# Patient Record
Sex: FEMALE | Race: WHITE | NOT HISPANIC OR LATINO | Employment: OTHER | URBAN - METROPOLITAN AREA
[De-identification: names, ages, dates, MRNs, and addresses within clinical notes are randomized per-mention and may not be internally consistent; named-entity substitution may affect disease eponyms.]

---

## 2017-01-10 ENCOUNTER — HOSPITAL ENCOUNTER (OUTPATIENT)
Dept: LAB | Facility: MEDICAL CENTER | Age: 82
End: 2017-01-10
Attending: INTERNAL MEDICINE
Payer: MEDICARE

## 2017-01-10 LAB
25(OH)D3 SERPL-MCNC: 25 NG/ML (ref 30–100)
ALBUMIN SERPL BCP-MCNC: 3.8 G/DL (ref 3.2–4.9)
BASOPHILS # BLD AUTO: 0.03 K/UL (ref 0–0.12)
BASOPHILS NFR BLD AUTO: 0.3 % (ref 0–1.8)
BUN SERPL-MCNC: 27 MG/DL (ref 8–22)
CALCIUM SERPL-MCNC: 9.5 MG/DL (ref 8.5–10.5)
CHLORIDE SERPL-SCNC: 107 MMOL/L (ref 96–112)
CO2 SERPL-SCNC: 20 MMOL/L (ref 20–33)
CREAT SERPL-MCNC: 1.46 MG/DL (ref 0.5–1.4)
CREAT UR-MCNC: 89 MG/DL
EOSINOPHIL # BLD: 0.09 K/UL (ref 0–0.51)
EOSINOPHIL NFR BLD AUTO: 0.8 % (ref 0–6.9)
ERYTHROCYTE [DISTWIDTH] IN BLOOD BY AUTOMATED COUNT: 49.4 FL (ref 35.9–50)
GLUCOSE SERPL-MCNC: 107 MG/DL (ref 65–99)
HCT VFR BLD AUTO: 38.5 % (ref 37–47)
HGB BLD-MCNC: 12.1 G/DL (ref 12–16)
IMM GRANULOCYTES # BLD AUTO: 0.04 K/UL (ref 0–0.11)
IMM GRANULOCYTES NFR BLD AUTO: 0.4 % (ref 0–0.9)
LYMPHOCYTES # BLD: 3.6 K/UL (ref 1–4.8)
LYMPHOCYTES NFR BLD AUTO: 33.5 % (ref 22–41)
MAGNESIUM SERPL-MCNC: 1.8 MG/DL (ref 1.5–2.5)
MCH RBC QN AUTO: 30.4 PG (ref 27–33)
MCHC RBC AUTO-ENTMCNC: 31.4 G/DL (ref 33.6–35)
MCV RBC AUTO: 96.7 FL (ref 81.4–97.8)
MONOCYTES # BLD: 0.84 K/UL (ref 0–0.85)
MONOCYTES NFR BLD AUTO: 7.8 % (ref 0–13.4)
NEUTROPHILS # BLD: 6.14 K/UL (ref 2–7.15)
NEUTROPHILS NFR BLD AUTO: 57.2 % (ref 44–72)
NRBC # BLD AUTO: 0 K/UL
NRBC BLD-RTO: 0 /100 WBC
PHOSPHATE SERPL-MCNC: 3.6 MG/DL (ref 2.5–4.5)
PLATELET # BLD AUTO: 336 K/UL (ref 164–446)
PMV BLD AUTO: 10.3 FL (ref 9–12.9)
POTASSIUM SERPL-SCNC: 4.2 MMOL/L (ref 3.6–5.5)
PROT 24H UR-MRATE: 18.7 MG/DL (ref 0–15)
PROT/CREAT UR: 210 MG/G (ref 10–107)
PTH-INTACT SERPL-MCNC: 86.8 PG/ML (ref 14–72)
RBC # BLD AUTO: 3.98 M/UL (ref 4.2–5.4)
SODIUM SERPL-SCNC: 136 MMOL/L (ref 135–145)
URATE SERPL-MCNC: 6.3 MG/DL (ref 1.9–8.2)
WBC # BLD AUTO: 10.7 K/UL (ref 4.8–10.8)

## 2017-01-10 PROCEDURE — 83735 ASSAY OF MAGNESIUM: CPT

## 2017-01-10 PROCEDURE — 83970 ASSAY OF PARATHORMONE: CPT

## 2017-01-10 PROCEDURE — 82570 ASSAY OF URINE CREATININE: CPT

## 2017-01-10 PROCEDURE — 85025 COMPLETE CBC W/AUTO DIFF WBC: CPT

## 2017-01-10 PROCEDURE — 84156 ASSAY OF PROTEIN URINE: CPT

## 2017-01-10 PROCEDURE — 36415 COLL VENOUS BLD VENIPUNCTURE: CPT

## 2017-01-10 PROCEDURE — 80069 RENAL FUNCTION PANEL: CPT

## 2017-01-10 PROCEDURE — 84550 ASSAY OF BLOOD/URIC ACID: CPT

## 2017-01-10 PROCEDURE — 82306 VITAMIN D 25 HYDROXY: CPT

## 2017-03-17 ENCOUNTER — HOSPITAL ENCOUNTER (OUTPATIENT)
Dept: RADIOLOGY | Facility: MEDICAL CENTER | Age: 82
End: 2017-03-17
Attending: NURSE PRACTITIONER
Payer: MEDICARE

## 2017-03-17 DIAGNOSIS — R41.3 MEMORY LOSS: ICD-10-CM

## 2017-03-17 PROCEDURE — 70551 MRI BRAIN STEM W/O DYE: CPT

## 2017-05-04 ENCOUNTER — HOSPITAL ENCOUNTER (OUTPATIENT)
Dept: LAB | Facility: MEDICAL CENTER | Age: 82
End: 2017-05-04
Attending: INTERNAL MEDICINE
Payer: MEDICARE

## 2017-05-04 ENCOUNTER — HOSPITAL ENCOUNTER (OUTPATIENT)
Dept: LAB | Facility: MEDICAL CENTER | Age: 82
End: 2017-05-04
Attending: NURSE PRACTITIONER
Payer: MEDICARE

## 2017-05-04 LAB
ALBUMIN SERPL BCP-MCNC: 3.9 G/DL (ref 3.2–4.9)
ALBUMIN SERPL BCP-MCNC: 4 G/DL (ref 3.2–4.9)
ALBUMIN/GLOB SERPL: 1.2 G/DL
ALP SERPL-CCNC: 73 U/L (ref 30–99)
ALT SERPL-CCNC: 10 U/L (ref 2–50)
ANION GAP SERPL CALC-SCNC: 9 MMOL/L (ref 0–11.9)
AST SERPL-CCNC: 16 U/L (ref 12–45)
BASOPHILS # BLD AUTO: 0.5 % (ref 0–1.8)
BASOPHILS # BLD: 0.03 K/UL (ref 0–0.12)
BILIRUB SERPL-MCNC: 0.3 MG/DL (ref 0.1–1.5)
BUN SERPL-MCNC: 46 MG/DL (ref 8–22)
BUN SERPL-MCNC: 46 MG/DL (ref 8–22)
CALCIUM SERPL-MCNC: 8.8 MG/DL (ref 8.5–10.5)
CALCIUM SERPL-MCNC: 8.9 MG/DL (ref 8.5–10.5)
CHLORIDE SERPL-SCNC: 106 MMOL/L (ref 96–112)
CHLORIDE SERPL-SCNC: 107 MMOL/L (ref 96–112)
CHOLEST SERPL-MCNC: 199 MG/DL (ref 100–199)
CO2 SERPL-SCNC: 23 MMOL/L (ref 20–33)
CO2 SERPL-SCNC: 23 MMOL/L (ref 20–33)
CREAT SERPL-MCNC: 1.64 MG/DL (ref 0.5–1.4)
CREAT SERPL-MCNC: 1.68 MG/DL (ref 0.5–1.4)
CREAT UR-MCNC: 98.4 MG/DL
EOSINOPHIL # BLD AUTO: 0.12 K/UL (ref 0–0.51)
EOSINOPHIL NFR BLD: 2 % (ref 0–6.9)
ERYTHROCYTE [DISTWIDTH] IN BLOOD BY AUTOMATED COUNT: 49.9 FL (ref 35.9–50)
GFR SERPL CREATININE-BSD FRML MDRD: 29 ML/MIN/1.73 M 2
GFR SERPL CREATININE-BSD FRML MDRD: 30 ML/MIN/1.73 M 2
GLOBULIN SER CALC-MCNC: 3.4 G/DL (ref 1.9–3.5)
GLUCOSE SERPL-MCNC: 94 MG/DL (ref 65–99)
GLUCOSE SERPL-MCNC: 95 MG/DL (ref 65–99)
HCT VFR BLD AUTO: 36.9 % (ref 37–47)
HDLC SERPL-MCNC: 59 MG/DL
HGB BLD-MCNC: 11.6 G/DL (ref 12–16)
IMM GRANULOCYTES # BLD AUTO: 0.01 K/UL (ref 0–0.11)
IMM GRANULOCYTES NFR BLD AUTO: 0.2 % (ref 0–0.9)
LDLC SERPL CALC-MCNC: 122 MG/DL
LYMPHOCYTES # BLD AUTO: 3.26 K/UL (ref 1–4.8)
LYMPHOCYTES NFR BLD: 53.9 % (ref 22–41)
MCH RBC QN AUTO: 30.1 PG (ref 27–33)
MCHC RBC AUTO-ENTMCNC: 31.4 G/DL (ref 33.6–35)
MCV RBC AUTO: 95.6 FL (ref 81.4–97.8)
MONOCYTES # BLD AUTO: 0.53 K/UL (ref 0–0.85)
MONOCYTES NFR BLD AUTO: 8.8 % (ref 0–13.4)
NEUTROPHILS # BLD AUTO: 2.1 K/UL (ref 2–7.15)
NEUTROPHILS NFR BLD: 34.6 % (ref 44–72)
NRBC # BLD AUTO: 0 K/UL
NRBC BLD AUTO-RTO: 0 /100 WBC
PHOSPHATE SERPL-MCNC: 3.9 MG/DL (ref 2.5–4.5)
PLATELET # BLD AUTO: 271 K/UL (ref 164–446)
PMV BLD AUTO: 10.7 FL (ref 9–12.9)
POTASSIUM SERPL-SCNC: 4.7 MMOL/L (ref 3.6–5.5)
POTASSIUM SERPL-SCNC: 4.7 MMOL/L (ref 3.6–5.5)
PROT SERPL-MCNC: 7.4 G/DL (ref 6–8.2)
PROT UR-MCNC: 8.8 MG/DL (ref 0–15)
PROT/CREAT UR: 89 MG/G (ref 10–107)
RBC # BLD AUTO: 3.86 M/UL (ref 4.2–5.4)
SODIUM SERPL-SCNC: 136 MMOL/L (ref 135–145)
SODIUM SERPL-SCNC: 139 MMOL/L (ref 135–145)
TRIGL SERPL-MCNC: 89 MG/DL (ref 0–149)
TSH SERPL DL<=0.005 MIU/L-ACNC: 2.57 UIU/ML (ref 0.3–3.7)
WBC # BLD AUTO: 6.1 K/UL (ref 4.8–10.8)

## 2017-05-04 PROCEDURE — 80061 LIPID PANEL: CPT

## 2017-05-04 PROCEDURE — 84443 ASSAY THYROID STIM HORMONE: CPT

## 2017-05-04 PROCEDURE — 80053 COMPREHEN METABOLIC PANEL: CPT

## 2017-09-18 ENCOUNTER — HOSPITAL ENCOUNTER (OUTPATIENT)
Dept: LAB | Facility: MEDICAL CENTER | Age: 82
End: 2017-09-18
Attending: INTERNAL MEDICINE
Payer: MEDICARE

## 2017-09-18 LAB
ALBUMIN SERPL BCP-MCNC: 3.7 G/DL (ref 3.2–4.9)
BUN SERPL-MCNC: 30 MG/DL (ref 8–22)
CALCIUM SERPL-MCNC: 9 MG/DL (ref 8.5–10.5)
CHLORIDE SERPL-SCNC: 101 MMOL/L (ref 96–112)
CO2 SERPL-SCNC: 25 MMOL/L (ref 20–33)
CREAT SERPL-MCNC: 1.33 MG/DL (ref 0.5–1.4)
CREAT UR-MCNC: 64.9 MG/DL
GFR SERPL CREATININE-BSD FRML MDRD: 38 ML/MIN/1.73 M 2
GLUCOSE SERPL-MCNC: 85 MG/DL (ref 65–99)
MAGNESIUM SERPL-MCNC: 2.1 MG/DL (ref 1.5–2.5)
PHOSPHATE SERPL-MCNC: 3.9 MG/DL (ref 2.5–4.5)
POTASSIUM SERPL-SCNC: 4 MMOL/L (ref 3.6–5.5)
PROT UR-MCNC: 7.5 MG/DL (ref 0–15)
PROT/CREAT UR: 116 MG/G (ref 10–107)
SODIUM SERPL-SCNC: 134 MMOL/L (ref 135–145)
URATE SERPL-MCNC: 6.1 MG/DL (ref 1.9–8.2)

## 2017-09-18 PROCEDURE — 36415 COLL VENOUS BLD VENIPUNCTURE: CPT

## 2017-09-18 PROCEDURE — 84550 ASSAY OF BLOOD/URIC ACID: CPT

## 2017-09-18 PROCEDURE — 80069 RENAL FUNCTION PANEL: CPT

## 2017-09-18 PROCEDURE — 82570 ASSAY OF URINE CREATININE: CPT

## 2017-09-18 PROCEDURE — 83735 ASSAY OF MAGNESIUM: CPT

## 2017-09-18 PROCEDURE — 84156 ASSAY OF PROTEIN URINE: CPT

## 2017-10-10 ENCOUNTER — HOSPITAL ENCOUNTER (OUTPATIENT)
Dept: RADIOLOGY | Facility: MEDICAL CENTER | Age: 82
End: 2017-10-10
Attending: NURSE PRACTITIONER
Payer: MEDICARE

## 2017-10-10 DIAGNOSIS — R10.84 ABDOMINAL PAIN, GENERALIZED: ICD-10-CM

## 2017-10-10 PROCEDURE — 76700 US EXAM ABDOM COMPLETE: CPT

## 2018-02-12 ENCOUNTER — HOSPITAL ENCOUNTER (OUTPATIENT)
Dept: LAB | Facility: MEDICAL CENTER | Age: 83
End: 2018-02-12
Attending: INTERNAL MEDICINE
Payer: MEDICARE

## 2018-02-12 LAB
25(OH)D3 SERPL-MCNC: 41 NG/ML (ref 30–100)
ALBUMIN SERPL BCP-MCNC: 4.2 G/DL (ref 3.2–4.9)
BASOPHILS # BLD AUTO: 0.3 % (ref 0–1.8)
BASOPHILS # BLD: 0.02 K/UL (ref 0–0.12)
BUN SERPL-MCNC: 46 MG/DL (ref 8–22)
CALCIUM SERPL-MCNC: 9.3 MG/DL (ref 8.5–10.5)
CHLORIDE SERPL-SCNC: 105 MMOL/L (ref 96–112)
CO2 SERPL-SCNC: 25 MMOL/L (ref 20–33)
CREAT SERPL-MCNC: 1.81 MG/DL (ref 0.5–1.4)
EOSINOPHIL # BLD AUTO: 0.05 K/UL (ref 0–0.51)
EOSINOPHIL NFR BLD: 0.8 % (ref 0–6.9)
ERYTHROCYTE [DISTWIDTH] IN BLOOD BY AUTOMATED COUNT: 49 FL (ref 35.9–50)
GLUCOSE SERPL-MCNC: 84 MG/DL (ref 65–99)
HCT VFR BLD AUTO: 34.6 % (ref 37–47)
HGB BLD-MCNC: 11.2 G/DL (ref 12–16)
IMM GRANULOCYTES # BLD AUTO: 0.01 K/UL (ref 0–0.11)
IMM GRANULOCYTES NFR BLD AUTO: 0.2 % (ref 0–0.9)
LYMPHOCYTES # BLD AUTO: 2.88 K/UL (ref 1–4.8)
LYMPHOCYTES NFR BLD: 45.8 % (ref 22–41)
MCH RBC QN AUTO: 30.9 PG (ref 27–33)
MCHC RBC AUTO-ENTMCNC: 32.4 G/DL (ref 33.6–35)
MCV RBC AUTO: 95.6 FL (ref 81.4–97.8)
MONOCYTES # BLD AUTO: 0.57 K/UL (ref 0–0.85)
MONOCYTES NFR BLD AUTO: 9.1 % (ref 0–13.4)
NEUTROPHILS # BLD AUTO: 2.76 K/UL (ref 2–7.15)
NEUTROPHILS NFR BLD: 43.8 % (ref 44–72)
NRBC # BLD AUTO: 0 K/UL
NRBC BLD-RTO: 0 /100 WBC
PHOSPHATE SERPL-MCNC: 4.5 MG/DL (ref 2.5–4.5)
PLATELET # BLD AUTO: 288 K/UL (ref 164–446)
PMV BLD AUTO: 10.2 FL (ref 9–12.9)
POTASSIUM SERPL-SCNC: 4.1 MMOL/L (ref 3.6–5.5)
PTH-INTACT SERPL-MCNC: 128.4 PG/ML (ref 14–72)
RBC # BLD AUTO: 3.62 M/UL (ref 4.2–5.4)
SODIUM SERPL-SCNC: 142 MMOL/L (ref 135–145)
WBC # BLD AUTO: 6.3 K/UL (ref 4.8–10.8)

## 2018-02-12 PROCEDURE — 85025 COMPLETE CBC W/AUTO DIFF WBC: CPT

## 2018-02-12 PROCEDURE — 80069 RENAL FUNCTION PANEL: CPT

## 2018-02-12 PROCEDURE — 82306 VITAMIN D 25 HYDROXY: CPT

## 2018-02-12 PROCEDURE — 36415 COLL VENOUS BLD VENIPUNCTURE: CPT

## 2018-02-12 PROCEDURE — 83970 ASSAY OF PARATHORMONE: CPT

## 2018-03-08 ENCOUNTER — HOSPITAL ENCOUNTER (OUTPATIENT)
Dept: LAB | Facility: MEDICAL CENTER | Age: 83
End: 2018-03-08
Attending: NURSE PRACTITIONER
Payer: MEDICARE

## 2018-03-08 ENCOUNTER — HOSPITAL ENCOUNTER (OUTPATIENT)
Dept: LAB | Facility: MEDICAL CENTER | Age: 83
End: 2018-03-08
Attending: INTERNAL MEDICINE
Payer: MEDICARE

## 2018-03-08 LAB
ALBUMIN SERPL BCP-MCNC: 4.3 G/DL (ref 3.2–4.9)
ALBUMIN SERPL BCP-MCNC: 4.4 G/DL (ref 3.2–4.9)
ALBUMIN/GLOB SERPL: 1.5 G/DL
ALP SERPL-CCNC: 60 U/L (ref 30–99)
ALT SERPL-CCNC: 15 U/L (ref 2–50)
ANION GAP SERPL CALC-SCNC: 8 MMOL/L (ref 0–11.9)
AST SERPL-CCNC: 19 U/L (ref 12–45)
BILIRUB SERPL-MCNC: 0.3 MG/DL (ref 0.1–1.5)
BUN SERPL-MCNC: 45 MG/DL (ref 8–22)
BUN SERPL-MCNC: 45 MG/DL (ref 8–22)
CALCIUM SERPL-MCNC: 9.7 MG/DL (ref 8.5–10.5)
CALCIUM SERPL-MCNC: 9.8 MG/DL (ref 8.5–10.5)
CHLORIDE SERPL-SCNC: 106 MMOL/L (ref 96–112)
CHLORIDE SERPL-SCNC: 106 MMOL/L (ref 96–112)
CHOLEST SERPL-MCNC: 196 MG/DL (ref 100–199)
CO2 SERPL-SCNC: 24 MMOL/L (ref 20–33)
CO2 SERPL-SCNC: 24 MMOL/L (ref 20–33)
CREAT SERPL-MCNC: 1.61 MG/DL (ref 0.5–1.4)
CREAT SERPL-MCNC: 1.67 MG/DL (ref 0.5–1.4)
CREAT UR-MCNC: 77.3 MG/DL
FERRITIN SERPL-MCNC: 58.6 NG/ML (ref 10–291)
GLOBULIN SER CALC-MCNC: 3 G/DL (ref 1.9–3.5)
GLUCOSE SERPL-MCNC: 87 MG/DL (ref 65–99)
GLUCOSE SERPL-MCNC: 89 MG/DL (ref 65–99)
HDLC SERPL-MCNC: 70 MG/DL
IRON SATN MFR SERPL: 19 % (ref 15–55)
IRON SERPL-MCNC: 73 UG/DL (ref 40–170)
LDLC SERPL CALC-MCNC: 104 MG/DL
PHOSPHATE SERPL-MCNC: 3.8 MG/DL (ref 2.5–4.5)
POTASSIUM SERPL-SCNC: 5 MMOL/L (ref 3.6–5.5)
POTASSIUM SERPL-SCNC: 5.4 MMOL/L (ref 3.6–5.5)
PROT SERPL-MCNC: 7.4 G/DL (ref 6–8.2)
PROT UR-MCNC: 7.1 MG/DL (ref 0–15)
PROT/CREAT UR: 92 MG/G (ref 10–107)
SODIUM SERPL-SCNC: 138 MMOL/L (ref 135–145)
SODIUM SERPL-SCNC: 139 MMOL/L (ref 135–145)
TIBC SERPL-MCNC: 379 UG/DL (ref 250–450)
TRIGL SERPL-MCNC: 110 MG/DL (ref 0–149)
TSH SERPL DL<=0.005 MIU/L-ACNC: 2.47 UIU/ML (ref 0.38–5.33)

## 2018-03-08 PROCEDURE — 36415 COLL VENOUS BLD VENIPUNCTURE: CPT

## 2018-03-08 PROCEDURE — 82570 ASSAY OF URINE CREATININE: CPT

## 2018-03-08 PROCEDURE — 80053 COMPREHEN METABOLIC PANEL: CPT

## 2018-03-08 PROCEDURE — 83550 IRON BINDING TEST: CPT

## 2018-03-08 PROCEDURE — 84156 ASSAY OF PROTEIN URINE: CPT

## 2018-03-08 PROCEDURE — 80069 RENAL FUNCTION PANEL: CPT

## 2018-03-08 PROCEDURE — 84443 ASSAY THYROID STIM HORMONE: CPT

## 2018-03-08 PROCEDURE — 82728 ASSAY OF FERRITIN: CPT

## 2018-03-08 PROCEDURE — 80061 LIPID PANEL: CPT

## 2018-03-08 PROCEDURE — 83540 ASSAY OF IRON: CPT

## 2018-05-17 ENCOUNTER — HOSPITAL ENCOUNTER (OUTPATIENT)
Dept: LAB | Facility: MEDICAL CENTER | Age: 83
End: 2018-05-17
Attending: INTERNAL MEDICINE
Payer: MEDICARE

## 2018-05-17 LAB
ALBUMIN SERPL BCP-MCNC: 4.1 G/DL (ref 3.2–4.9)
BASOPHILS # BLD AUTO: 0.5 % (ref 0–1.8)
BASOPHILS # BLD: 0.03 K/UL (ref 0–0.12)
BUN SERPL-MCNC: 28 MG/DL (ref 8–22)
CALCIUM SERPL-MCNC: 9.2 MG/DL (ref 8.5–10.5)
CHLORIDE SERPL-SCNC: 108 MMOL/L (ref 96–112)
CO2 SERPL-SCNC: 24 MMOL/L (ref 20–33)
CREAT SERPL-MCNC: 1.26 MG/DL (ref 0.5–1.4)
EOSINOPHIL # BLD AUTO: 0.11 K/UL (ref 0–0.51)
EOSINOPHIL NFR BLD: 1.7 % (ref 0–6.9)
ERYTHROCYTE [DISTWIDTH] IN BLOOD BY AUTOMATED COUNT: 51.8 FL (ref 35.9–50)
GLUCOSE SERPL-MCNC: 79 MG/DL (ref 65–99)
HCT VFR BLD AUTO: 38.1 % (ref 37–47)
HGB BLD-MCNC: 11.5 G/DL (ref 12–16)
IMM GRANULOCYTES # BLD AUTO: 0.01 K/UL (ref 0–0.11)
IMM GRANULOCYTES NFR BLD AUTO: 0.2 % (ref 0–0.9)
LYMPHOCYTES # BLD AUTO: 2.22 K/UL (ref 1–4.8)
LYMPHOCYTES NFR BLD: 33.4 % (ref 22–41)
MCH RBC QN AUTO: 30.4 PG (ref 27–33)
MCHC RBC AUTO-ENTMCNC: 30.2 G/DL (ref 33.6–35)
MCV RBC AUTO: 100.8 FL (ref 81.4–97.8)
MONOCYTES # BLD AUTO: 0.51 K/UL (ref 0–0.85)
MONOCYTES NFR BLD AUTO: 7.7 % (ref 0–13.4)
NEUTROPHILS # BLD AUTO: 3.76 K/UL (ref 2–7.15)
NEUTROPHILS NFR BLD: 56.5 % (ref 44–72)
NRBC # BLD AUTO: 0 K/UL
NRBC BLD-RTO: 0 /100 WBC
PHOSPHATE SERPL-MCNC: 3.4 MG/DL (ref 2.5–4.5)
PLATELET # BLD AUTO: 315 K/UL (ref 164–446)
PMV BLD AUTO: 10.4 FL (ref 9–12.9)
POTASSIUM SERPL-SCNC: 4.5 MMOL/L (ref 3.6–5.5)
RBC # BLD AUTO: 3.78 M/UL (ref 4.2–5.4)
SODIUM SERPL-SCNC: 139 MMOL/L (ref 135–145)
URATE SERPL-MCNC: 5.5 MG/DL (ref 1.9–8.2)
WBC # BLD AUTO: 6.6 K/UL (ref 4.8–10.8)

## 2018-05-17 PROCEDURE — 36415 COLL VENOUS BLD VENIPUNCTURE: CPT

## 2018-05-17 PROCEDURE — 85025 COMPLETE CBC W/AUTO DIFF WBC: CPT

## 2018-05-17 PROCEDURE — 80069 RENAL FUNCTION PANEL: CPT

## 2018-05-17 PROCEDURE — 84550 ASSAY OF BLOOD/URIC ACID: CPT

## 2018-05-25 ENCOUNTER — OFFICE VISIT (OUTPATIENT)
Dept: MEDICAL GROUP | Facility: MEDICAL CENTER | Age: 83
End: 2018-05-25
Payer: MEDICARE

## 2018-05-25 VITALS
OXYGEN SATURATION: 93 % | DIASTOLIC BLOOD PRESSURE: 40 MMHG | RESPIRATION RATE: 16 BRPM | TEMPERATURE: 98.2 F | HEART RATE: 77 BPM | BODY MASS INDEX: 22.64 KG/M2 | HEIGHT: 60 IN | WEIGHT: 115.3 LBS | SYSTOLIC BLOOD PRESSURE: 112 MMHG

## 2018-05-25 DIAGNOSIS — K21.9 GASTROESOPHAGEAL REFLUX DISEASE WITHOUT ESOPHAGITIS: ICD-10-CM

## 2018-05-25 DIAGNOSIS — M79.7 FIBROMYALGIA: ICD-10-CM

## 2018-05-25 DIAGNOSIS — N25.81 SECONDARY HYPERPARATHYROIDISM OF RENAL ORIGIN (HCC): ICD-10-CM

## 2018-05-25 DIAGNOSIS — E78.5 DYSLIPIDEMIA: ICD-10-CM

## 2018-05-25 DIAGNOSIS — K58.9 IRRITABLE BOWEL SYNDROME, UNSPECIFIED TYPE: ICD-10-CM

## 2018-05-25 DIAGNOSIS — I10 ESSENTIAL HYPERTENSION: ICD-10-CM

## 2018-05-25 DIAGNOSIS — E03.4 HYPOTHYROIDISM DUE TO ACQUIRED ATROPHY OF THYROID: ICD-10-CM

## 2018-05-25 DIAGNOSIS — F32.4 MAJOR DEPRESSIVE DISORDER WITH SINGLE EPISODE, IN PARTIAL REMISSION (HCC): ICD-10-CM

## 2018-05-25 DIAGNOSIS — N18.30 CKD (CHRONIC KIDNEY DISEASE) STAGE 3, GFR 30-59 ML/MIN (HCC): ICD-10-CM

## 2018-05-25 PROCEDURE — 99204 OFFICE O/P NEW MOD 45 MIN: CPT | Performed by: FAMILY MEDICINE

## 2018-05-25 RX ORDER — SUCRALFATE 1 G/1
1 TABLET ORAL
Status: ON HOLD | COMMUNITY
End: 2018-07-25

## 2018-05-25 NOTE — PROGRESS NOTES
CC: New patient ( HTN, depression, GERD, thyroid hypofunction, IBS, fibromyalgia, CKD, others)    HPI:  Anthony presents today to establish a new PCP.    Patient has been active, and independent with al ADLs. Has the following medical issues:    Essential hypertension  BP has been adequately controlled on current medication. Denies headache, chest pain, and SOB.Has been on lisinopril 10 mg daily    Major depressive disorder with single episode, in partial remission (HCC)  Patient has been asymptomatic. Her mood has improved a lot with the on Cymbalta,denies any suicidal ideation.    Gastroesophageal reflux disease without esophagitis  Has been asymptomatic. Denies epigastric pain, and heart burn which was more frequent and debilitating befre using the Sucralfate/ and Zantac     Hypothyroidism due to acquired atrophy of thyroid  She has been tolerating Levothyroxine, no palpitation, no cold or heat intolerance, currently on Levothyroxine 50 mcg daily.    Irritable bowel syndrome, unspecified type  Patient is currently asymptomatic.Symptoms usually comes as abdominal pain with constipation or diarrhea, and stays with her for few days and disappear for a while.Has been well controlled on diet.    Fibromyalgia  Patient stated that the Norco, Alprazolam, and Tizanidine has been helping her symptoms,and she does not taking them in a regular basis, only as needed. Discussed with patient the risk of taking those medication in her age, insisted that are only combination that has been helping. Advised her to go to pain specialist to discuss and evaluate her pain from fibromyalgia.    Dyslipidemia  Last lipid panel ( T chol 196, , HDL 70, ). Not on medication.No h/o stroke, DM, or CAD.    CKD (chronic kidney disease) stage 3, GFR 30-59 ml/min  She has been asymptomatic. Her most recent eGFR is 40, however with normal Cr ( 1.2).has been following up with nephrology.    Secondary hyperparathyroidism of renal origin  (HCC)  Her last PTH was checked in 2/2018 was high ( 128), however her eGFR at that time was low ( 26), now is 40. Her calcuim has been within normal limit.Has been asymptomatic.      Patient Active Problem List    Diagnosis Date Noted   • Tachyarrhythmia 04/28/2011     Priority: High   • Other hammer toe (acquired) 08/06/2015   • LBBB (left bundle branch block) 11/07/2013   • CKD (chronic kidney disease) stage 3, GFR 30-59 ml/min 11/07/2013   • Bruit of left carotid artery 11/07/2013   • Fatigue 11/07/2013   • Glaucoma 11/30/2012   • Toe dislocation 05/17/2012   • Major depression 10/27/2011   • HTN (hypertension) 04/28/2011   • GERD (gastroesophageal reflux disease) 04/28/2011   • Renal insufficiency 04/12/2011   • CATARACT    • Fibromyalgia    • IBS (irritable bowel syndrome)    • Dyslipidemia    • Hives        Current Outpatient Prescriptions   Medication Sig Dispense Refill   • sucralfate (CARAFATE) 1 GM Tab Take 1 g by mouth 4 Times a Day,Before Meals and at Bedtime.     • duloxetine (CYMBALTA) 60 MG CPEP delayed-release capsule Take 60 mg by mouth every day.     • lisinopril (PRINIVIL) 10 MG TABS Take 20 mg by mouth every day.     • hydrocodone-acetaminophen (NORCO) 7.5-325 MG per tablet Take 1-2 Tabs by mouth every 6 hours as needed.     • tizanidine (ZANAFLEX) 2 MG capsule Take 2 mg by mouth as needed.     • Cholecalciferol (VITAMIN D3) 5000 UNITS TABS Take  by mouth every day.     • Ferrous Sulfate (IRON) 325 (65 FE) MG TABS Take  by mouth every 48 hours.     • diphenhydrAMINE (BENADRYL) 25 MG TABS Take 25 mg by mouth every 6 hours as needed for Sleep.     • levothyroxine (SYNTHROID) 50 MCG TABS Take 50 mcg by mouth every day.     • alprazolam (XANAX) 0.25 MG TABS Take 1 Tab by mouth at bedtime as needed for Sleep. [employer] fax # [phone]   • ranitidine (ZANTAC) 150 MG TABS Take 150 mg by mouth as needed.     • GLUCOSAMINE PO Take 1,000 mg by mouth every day.     • latanoprost (XALATAN) 0.005 %  SOLN Place 1 Drop in both eyes every evening.       No current facility-administered medications for this visit.          Allergies as of 05/25/2018 - Reviewed 05/25/2018   Allergen Reaction Noted   • Nkda [no known drug allergy]  01/11/2011        Social History     Social History   • Marital status:      Spouse name: N/A   • Number of children: N/A   • Years of education: N/A     Occupational History   • Not on file.     Social History Main Topics   • Smoking status: Never Smoker   • Smokeless tobacco: Never Used   • Alcohol use No   • Drug use: No   • Sexual activity: No     Other Topics Concern   • Not on file     Social History Narrative   • No narrative on file       Family History   Problem Relation Age of Onset   • Heart Disease Father    • Lung Disease Father        Past Surgical History:   Procedure Laterality Date   • TOE AMPUTATION Left 8/6/2015    Procedure: TOE AMPUTATION 2ND;  Surgeon: Fredi Nelson D.P.M.;  Location: SURGERY [facility];  Service:    • CATARACT EXTRACTION WITH IOL     • OTHER      cataracts removed       ROS:  Denies any Headache, Blurred Vision, Confusion Chest pain,  Shortness of breath,  Abdominal pain, Changes of bowel or bladder, Lower ext edema, Fevers, Nights sweats, Weight Changes, Focal weakness or numbness.  All other systems are negative.    /40   Pulse 77   Temp 36.8 °C (98.2 °F)   Resp 16   Ht 1.524 m (5')   Wt 52.3 kg (115 lb 4.8 oz)   SpO2 93%   BMI 22.52 kg/m²     Physical Exam:  Gen:         Alert and oriented, No apparent distress.  HEENT:   Perrla, TM clear,  Oralpharynx no erythema or exudates.  Neck:       No Jugular venous distension, Lymphadenopathy, Thyromegaly, Bruits.  Lungs:     Clear to auscultation bilaterally  CV:          Regular rate and rhythm. No murmurs, rubs or gallops.  Abd:         Soft non tender, non distended. Normal active bowel sounds. No                                        Hepatosplenomegaly, No pulsatile  masses.  Ext:          No clubbing, cyanosis, edema.      Assessment and Plan.   86 y.o. female     1. Essential hypertension  Has been adequately controlled on current medication. Denies headache, chest pain, and SOB.  Continue on lisinopril 10 mg daily    2. Major depressive disorder with single episode, in partial remission (HCC)  Has been doing fine on Cymbalta, which shs been helping her fibromyalgia as well.    3. Gastroesophageal reflux disease without esophagitis  Sucralfate/ and Zantac have been helping a lot.     4. Hypothyroidism due to acquired atrophy of thyroid  He has been tolerating Levothyroxine, no palpitation, no cold or heat intolerance  Continue on Levothyroxine 50 mcg daily.    5. Irritable bowel syndrome, unspecified type  In remission, currently asymptomatic.    6. Fibromyalgia  As per patient Norco, Alprazolam, and Tizanidine has been helping her symptoms, patient stated she has been taking those medication as needed. Discussed with patient the risk of taking those medication in her age, insisted that are only combination that has been helping. Advised her to go to pain specialist to discuss and evaluate her pain from fibromyalgia, patient agrees.    - REFERRAL TO PAIN CLINIC    7. Dyslipidemia  Last lipid panel ( T chol 196, , HDL 70, ). Not on medication.  Ptient is counseled about life style modification.    8. CKD (chronic kidney disease) stage 3, GFR 30-59 ml/min  Most recent eGFR is 40, however with normal Cr ( 1.2).  Recommend: hydration, and avoiding nephrotoxic medications.    9. Secondary hyperparathyroidism of renal origin (HCC)  Last PTH was checked in 2/2018 was high ( 128), however her eGFR at that time was low ( 26), now is 40. Her calcuim has been within normal limit.

## 2018-06-27 RX ORDER — DULOXETIN HYDROCHLORIDE 60 MG/1
60 CAPSULE, DELAYED RELEASE ORAL DAILY
Qty: 30 CAP | Refills: 3 | Status: SHIPPED | OUTPATIENT
Start: 2018-06-27 | End: 2018-07-19 | Stop reason: SDUPTHER

## 2018-07-19 RX ORDER — DULOXETIN HYDROCHLORIDE 60 MG/1
60 CAPSULE, DELAYED RELEASE ORAL DAILY
Qty: 30 CAP | Refills: 3 | Status: SHIPPED | OUTPATIENT
Start: 2018-07-19 | End: 2018-07-24 | Stop reason: SDUPTHER

## 2018-07-24 ENCOUNTER — APPOINTMENT (OUTPATIENT)
Dept: RADIOLOGY | Facility: MEDICAL CENTER | Age: 83
End: 2018-07-24
Attending: EMERGENCY MEDICINE
Payer: MEDICARE

## 2018-07-24 ENCOUNTER — HOSPITAL ENCOUNTER (OUTPATIENT)
Facility: MEDICAL CENTER | Age: 83
End: 2018-07-26
Attending: EMERGENCY MEDICINE | Admitting: HOSPITALIST
Payer: MEDICARE

## 2018-07-24 DIAGNOSIS — I47.10 SVT (SUPRAVENTRICULAR TACHYCARDIA): ICD-10-CM

## 2018-07-24 LAB
ALBUMIN SERPL BCP-MCNC: 4.1 G/DL (ref 3.2–4.9)
ALBUMIN/GLOB SERPL: 1.9 G/DL
ALP SERPL-CCNC: 58 U/L (ref 30–99)
ALT SERPL-CCNC: 21 U/L (ref 2–50)
ANION GAP SERPL CALC-SCNC: 9 MMOL/L (ref 0–11.9)
APTT PPP: 28.1 SEC (ref 24.7–36)
AST SERPL-CCNC: 27 U/L (ref 12–45)
BASOPHILS # BLD AUTO: 0.4 % (ref 0–1.8)
BASOPHILS # BLD: 0.02 K/UL (ref 0–0.12)
BILIRUB SERPL-MCNC: 0.3 MG/DL (ref 0.1–1.5)
BNP SERPL-MCNC: 200 PG/ML (ref 0–100)
BUN SERPL-MCNC: 33 MG/DL (ref 8–22)
CALCIUM SERPL-MCNC: 8.9 MG/DL (ref 8.5–10.5)
CHLORIDE SERPL-SCNC: 110 MMOL/L (ref 96–112)
CO2 SERPL-SCNC: 22 MMOL/L (ref 20–33)
CREAT SERPL-MCNC: 1.43 MG/DL (ref 0.5–1.4)
EOSINOPHIL # BLD AUTO: 0.08 K/UL (ref 0–0.51)
EOSINOPHIL NFR BLD: 1.6 % (ref 0–6.9)
ERYTHROCYTE [DISTWIDTH] IN BLOOD BY AUTOMATED COUNT: 52.4 FL (ref 35.9–50)
GLOBULIN SER CALC-MCNC: 2.2 G/DL (ref 1.9–3.5)
GLUCOSE SERPL-MCNC: 105 MG/DL (ref 65–99)
HCT VFR BLD AUTO: 33.5 % (ref 37–47)
HGB BLD-MCNC: 10.9 G/DL (ref 12–16)
IMM GRANULOCYTES # BLD AUTO: 0.01 K/UL (ref 0–0.11)
IMM GRANULOCYTES NFR BLD AUTO: 0.2 % (ref 0–0.9)
INR PPP: 1.12 (ref 0.87–1.13)
LYMPHOCYTES # BLD AUTO: 2.37 K/UL (ref 1–4.8)
LYMPHOCYTES NFR BLD: 46.3 % (ref 22–41)
MCH RBC QN AUTO: 30.9 PG (ref 27–33)
MCHC RBC AUTO-ENTMCNC: 32.5 G/DL (ref 33.6–35)
MCV RBC AUTO: 94.9 FL (ref 81.4–97.8)
MONOCYTES # BLD AUTO: 0.5 K/UL (ref 0–0.85)
MONOCYTES NFR BLD AUTO: 9.8 % (ref 0–13.4)
NEUTROPHILS # BLD AUTO: 2.14 K/UL (ref 2–7.15)
NEUTROPHILS NFR BLD: 41.7 % (ref 44–72)
NRBC # BLD AUTO: 0 K/UL
NRBC BLD-RTO: 0 /100 WBC
PLATELET # BLD AUTO: 238 K/UL (ref 164–446)
PMV BLD AUTO: 10.3 FL (ref 9–12.9)
POTASSIUM SERPL-SCNC: 3.8 MMOL/L (ref 3.6–5.5)
PROT SERPL-MCNC: 6.3 G/DL (ref 6–8.2)
PROTHROMBIN TIME: 14.1 SEC (ref 12–14.6)
RBC # BLD AUTO: 3.53 M/UL (ref 4.2–5.4)
SODIUM SERPL-SCNC: 141 MMOL/L (ref 135–145)
TROPONIN I SERPL-MCNC: 0.02 NG/ML (ref 0–0.04)
TSH SERPL DL<=0.005 MIU/L-ACNC: 1.87 UIU/ML (ref 0.38–5.33)
WBC # BLD AUTO: 5.1 K/UL (ref 4.8–10.8)

## 2018-07-24 PROCEDURE — 36415 COLL VENOUS BLD VENIPUNCTURE: CPT

## 2018-07-24 PROCEDURE — 99285 EMERGENCY DEPT VISIT HI MDM: CPT

## 2018-07-24 PROCEDURE — 85730 THROMBOPLASTIN TIME PARTIAL: CPT

## 2018-07-24 PROCEDURE — 84484 ASSAY OF TROPONIN QUANT: CPT

## 2018-07-24 PROCEDURE — 85610 PROTHROMBIN TIME: CPT

## 2018-07-24 PROCEDURE — 80053 COMPREHEN METABOLIC PANEL: CPT

## 2018-07-24 PROCEDURE — 304561 HCHG STAT O2

## 2018-07-24 PROCEDURE — 93005 ELECTROCARDIOGRAM TRACING: CPT | Performed by: EMERGENCY MEDICINE

## 2018-07-24 PROCEDURE — 96372 THER/PROPH/DIAG INJ SC/IM: CPT

## 2018-07-24 PROCEDURE — 83880 ASSAY OF NATRIURETIC PEPTIDE: CPT

## 2018-07-24 PROCEDURE — 71045 X-RAY EXAM CHEST 1 VIEW: CPT

## 2018-07-24 PROCEDURE — 84443 ASSAY THYROID STIM HORMONE: CPT

## 2018-07-24 PROCEDURE — 85025 COMPLETE CBC W/AUTO DIFF WBC: CPT

## 2018-07-24 RX ORDER — DULOXETIN HYDROCHLORIDE 60 MG/1
60 CAPSULE, DELAYED RELEASE ORAL DAILY
Qty: 30 CAP | Refills: 3 | Status: SHIPPED | OUTPATIENT
Start: 2018-07-24 | End: 2018-10-29 | Stop reason: SDUPTHER

## 2018-07-24 ASSESSMENT — PAIN SCALES - GENERAL: PAINLEVEL_OUTOF10: 0

## 2018-07-25 ENCOUNTER — APPOINTMENT (OUTPATIENT)
Dept: RADIOLOGY | Facility: MEDICAL CENTER | Age: 83
End: 2018-07-25
Attending: HOSPITALIST
Payer: MEDICARE

## 2018-07-25 PROBLEM — E03.9 HYPOTHYROIDISM: Status: ACTIVE | Noted: 2018-07-25

## 2018-07-25 PROBLEM — N17.9 AKI (ACUTE KIDNEY INJURY) (HCC): Status: ACTIVE | Noted: 2018-07-25

## 2018-07-25 PROBLEM — D64.9 NORMOCYTIC ANEMIA: Status: ACTIVE | Noted: 2018-07-25

## 2018-07-25 LAB
EKG IMPRESSION: NORMAL
EKG IMPRESSION: NORMAL
EST. AVERAGE GLUCOSE BLD GHB EST-MCNC: 120 MG/DL
HBA1C MFR BLD: 5.8 % (ref 0–5.6)
TROPONIN I SERPL-MCNC: 0.05 NG/ML (ref 0–0.04)
TROPONIN I SERPL-MCNC: 0.07 NG/ML (ref 0–0.04)

## 2018-07-25 PROCEDURE — 99220 PR INITIAL OBSERVATION CARE,LEVL III: CPT | Performed by: HOSPITALIST

## 2018-07-25 PROCEDURE — G0378 HOSPITAL OBSERVATION PER HR: HCPCS

## 2018-07-25 PROCEDURE — 700111 HCHG RX REV CODE 636 W/ 250 OVERRIDE (IP): Performed by: HOSPITALIST

## 2018-07-25 PROCEDURE — 84484 ASSAY OF TROPONIN QUANT: CPT | Mod: 91

## 2018-07-25 PROCEDURE — 700105 HCHG RX REV CODE 258: Performed by: HOSPITALIST

## 2018-07-25 PROCEDURE — A9270 NON-COVERED ITEM OR SERVICE: HCPCS | Performed by: HOSPITALIST

## 2018-07-25 PROCEDURE — 96372 THER/PROPH/DIAG INJ SC/IM: CPT

## 2018-07-25 PROCEDURE — 83036 HEMOGLOBIN GLYCOSYLATED A1C: CPT

## 2018-07-25 PROCEDURE — 93005 ELECTROCARDIOGRAM TRACING: CPT | Performed by: EMERGENCY MEDICINE

## 2018-07-25 PROCEDURE — 36415 COLL VENOUS BLD VENIPUNCTURE: CPT

## 2018-07-25 PROCEDURE — 700102 HCHG RX REV CODE 250 W/ 637 OVERRIDE(OP): Performed by: HOSPITALIST

## 2018-07-25 RX ORDER — LEVOTHYROXINE SODIUM 0.05 MG/1
50 TABLET ORAL DAILY
Status: DISCONTINUED | OUTPATIENT
Start: 2018-07-25 | End: 2018-07-27 | Stop reason: HOSPADM

## 2018-07-25 RX ORDER — ONDANSETRON 4 MG/1
4 TABLET, ORALLY DISINTEGRATING ORAL EVERY 4 HOURS PRN
Status: DISCONTINUED | OUTPATIENT
Start: 2018-07-25 | End: 2018-07-27 | Stop reason: HOSPADM

## 2018-07-25 RX ORDER — BRINZOLAMIDE 10 MG/ML
1 SUSPENSION/ DROPS OPHTHALMIC 2 TIMES DAILY
COMMUNITY
End: 2021-07-30

## 2018-07-25 RX ORDER — DULOXETIN HYDROCHLORIDE 60 MG/1
60 CAPSULE, DELAYED RELEASE ORAL DAILY
Status: DISCONTINUED | OUTPATIENT
Start: 2018-07-25 | End: 2018-07-27 | Stop reason: HOSPADM

## 2018-07-25 RX ORDER — BISACODYL 10 MG
10 SUPPOSITORY, RECTAL RECTAL
Status: DISCONTINUED | OUTPATIENT
Start: 2018-07-25 | End: 2018-07-27 | Stop reason: HOSPADM

## 2018-07-25 RX ORDER — ASPIRIN 325 MG
325 TABLET ORAL DAILY
Status: DISCONTINUED | OUTPATIENT
Start: 2018-07-25 | End: 2018-07-27 | Stop reason: HOSPADM

## 2018-07-25 RX ORDER — DULOXETIN HYDROCHLORIDE 30 MG/1
30 CAPSULE, DELAYED RELEASE ORAL DAILY
Status: ON HOLD | COMMUNITY
End: 2018-07-25

## 2018-07-25 RX ORDER — LISINOPRIL 20 MG/1
20 TABLET ORAL DAILY
Status: DISCONTINUED | OUTPATIENT
Start: 2018-07-25 | End: 2018-07-27 | Stop reason: HOSPADM

## 2018-07-25 RX ORDER — ACETAMINOPHEN 325 MG/1
650 TABLET ORAL EVERY 6 HOURS PRN
Status: DISCONTINUED | OUTPATIENT
Start: 2018-07-25 | End: 2018-07-27 | Stop reason: HOSPADM

## 2018-07-25 RX ORDER — ASPIRIN 300 MG/1
300 SUPPOSITORY RECTAL DAILY
Status: DISCONTINUED | OUTPATIENT
Start: 2018-07-25 | End: 2018-07-27 | Stop reason: HOSPADM

## 2018-07-25 RX ORDER — ONDANSETRON 2 MG/ML
4 INJECTION INTRAMUSCULAR; INTRAVENOUS EVERY 4 HOURS PRN
Status: DISCONTINUED | OUTPATIENT
Start: 2018-07-25 | End: 2018-07-27 | Stop reason: HOSPADM

## 2018-07-25 RX ORDER — ASPIRIN 81 MG/1
324 TABLET, CHEWABLE ORAL DAILY
Status: DISCONTINUED | OUTPATIENT
Start: 2018-07-25 | End: 2018-07-27 | Stop reason: HOSPADM

## 2018-07-25 RX ORDER — LATANOPROST 50 UG/ML
1 SOLUTION/ DROPS OPHTHALMIC NIGHTLY
Status: DISCONTINUED | OUTPATIENT
Start: 2018-07-25 | End: 2018-07-27 | Stop reason: HOSPADM

## 2018-07-25 RX ORDER — SODIUM CHLORIDE 9 MG/ML
INJECTION, SOLUTION INTRAVENOUS CONTINUOUS
Status: DISCONTINUED | OUTPATIENT
Start: 2018-07-25 | End: 2018-07-27 | Stop reason: HOSPADM

## 2018-07-25 RX ORDER — CLONIDINE HYDROCHLORIDE 0.1 MG/1
0.2 TABLET ORAL EVERY 4 HOURS PRN
Status: DISCONTINUED | OUTPATIENT
Start: 2018-07-25 | End: 2018-07-27 | Stop reason: HOSPADM

## 2018-07-25 RX ORDER — AMOXICILLIN 250 MG
2 CAPSULE ORAL 2 TIMES DAILY
Status: DISCONTINUED | OUTPATIENT
Start: 2018-07-25 | End: 2018-07-27 | Stop reason: HOSPADM

## 2018-07-25 RX ORDER — TIZANIDINE HYDROCHLORIDE 4 MG/1
4 CAPSULE, GELATIN COATED ORAL EVERY 6 HOURS PRN
COMMUNITY
End: 2020-05-14 | Stop reason: SDUPTHER

## 2018-07-25 RX ORDER — POLYETHYLENE GLYCOL 3350 17 G/17G
1 POWDER, FOR SOLUTION ORAL
Status: DISCONTINUED | OUTPATIENT
Start: 2018-07-25 | End: 2018-07-27 | Stop reason: HOSPADM

## 2018-07-25 RX ORDER — LISINOPRIL 20 MG/1
20 TABLET ORAL EVERY MORNING
COMMUNITY
End: 2018-07-31 | Stop reason: SDUPTHER

## 2018-07-25 RX ORDER — RANITIDINE 150 MG/1
150 TABLET ORAL PRN
Status: DISCONTINUED | OUTPATIENT
Start: 2018-07-25 | End: 2018-07-25

## 2018-07-25 RX ORDER — HYDROCODONE BITARTRATE AND ACETAMINOPHEN 7.5; 325 MG/1; MG/1
1-2 TABLET ORAL EVERY 6 HOURS PRN
Status: DISCONTINUED | OUTPATIENT
Start: 2018-07-25 | End: 2018-07-26

## 2018-07-25 RX ADMIN — SODIUM CHLORIDE: 9 INJECTION, SOLUTION INTRAVENOUS at 05:35

## 2018-07-25 RX ADMIN — ENOXAPARIN SODIUM 30 MG: 100 INJECTION SUBCUTANEOUS at 05:36

## 2018-07-25 RX ADMIN — SODIUM CHLORIDE: 9 INJECTION, SOLUTION INTRAVENOUS at 06:53

## 2018-07-25 RX ADMIN — SODIUM CHLORIDE: 9 INJECTION, SOLUTION INTRAVENOUS at 19:11

## 2018-07-25 RX ADMIN — ACETAMINOPHEN 650 MG: 325 TABLET, FILM COATED ORAL at 23:19

## 2018-07-25 RX ADMIN — LISINOPRIL 20 MG: 20 TABLET ORAL at 05:37

## 2018-07-25 RX ADMIN — ASPIRIN 325 MG: 325 TABLET ORAL at 05:36

## 2018-07-25 RX ADMIN — LATANOPROST 1 DROP: 50 SOLUTION OPHTHALMIC at 23:02

## 2018-07-25 RX ADMIN — DULOXETINE HYDROCHLORIDE 60 MG: 60 CAPSULE, DELAYED RELEASE ORAL at 05:36

## 2018-07-25 RX ADMIN — LEVOTHYROXINE SODIUM 50 MCG: 50 TABLET ORAL at 07:00

## 2018-07-25 RX ADMIN — CLONIDINE HYDROCHLORIDE 0.2 MG: 0.1 TABLET ORAL at 07:58

## 2018-07-25 ASSESSMENT — LIFESTYLE VARIABLES
ALCOHOL_USE: NO
EVER_SMOKED: NEVER

## 2018-07-25 ASSESSMENT — COGNITIVE AND FUNCTIONAL STATUS - GENERAL
DAILY ACTIVITIY SCORE: 21
MOBILITY SCORE: 20
SUGGESTED CMS G CODE MODIFIER MOBILITY: CJ
STANDING UP FROM CHAIR USING ARMS: A LITTLE
DRESSING REGULAR LOWER BODY CLOTHING: A LITTLE
HELP NEEDED FOR BATHING: A LITTLE
CLIMB 3 TO 5 STEPS WITH RAILING: A LITTLE
TOILETING: A LITTLE
WALKING IN HOSPITAL ROOM: A LITTLE
MOVING FROM LYING ON BACK TO SITTING ON SIDE OF FLAT BED: A LITTLE
SUGGESTED CMS G CODE MODIFIER DAILY ACTIVITY: CJ

## 2018-07-25 ASSESSMENT — ENCOUNTER SYMPTOMS
EYES NEGATIVE: 1
RESPIRATORY NEGATIVE: 1
NEUROLOGICAL NEGATIVE: 1
CONSTITUTIONAL NEGATIVE: 1
PALPITATIONS: 1
MUSCULOSKELETAL NEGATIVE: 1
PSYCHIATRIC NEGATIVE: 1
GASTROINTESTINAL NEGATIVE: 1

## 2018-07-25 ASSESSMENT — PATIENT HEALTH QUESTIONNAIRE - PHQ9
2. FEELING DOWN, DEPRESSED, IRRITABLE, OR HOPELESS: NOT AT ALL
SUM OF ALL RESPONSES TO PHQ9 QUESTIONS 1 AND 2: 0
1. LITTLE INTEREST OR PLEASURE IN DOING THINGS: NOT AT ALL

## 2018-07-25 ASSESSMENT — COPD QUESTIONNAIRES
COPD SCREENING SCORE: 2
IN THE PAST 12 MONTHS DO YOU DO LESS THAN YOU USED TO BECAUSE OF YOUR BREATHING PROBLEMS: DISAGREE/UNSURE
DURING THE PAST 4 WEEKS HOW MUCH DID YOU FEEL SHORT OF BREATH: NONE/LITTLE OF THE TIME
DO YOU EVER COUGH UP ANY MUCUS OR PHLEGM?: NO/ONLY WITH OCCASIONAL COLDS OR INFECTIONS
HAVE YOU SMOKED AT LEAST 100 CIGARETTES IN YOUR ENTIRE LIFE: NO/DON'T KNOW

## 2018-07-25 ASSESSMENT — PAIN SCALES - GENERAL
PAINLEVEL_OUTOF10: 0

## 2018-07-25 NOTE — ED PROVIDER NOTES
ED Provider Note    CHIEF COMPLAINT  Chief Complaint   Patient presents with   • Supraventricular Tachycardia (SVT)     Pt called nurse helath line to report rapid HR up to 117 on her monitor.  EMS found her to be in SVT.  She converted during the EMS visit.  EKG upon arrival showed possible STEMI.         HPI  Anthony Lynn is a 86 y.o. female who presents with palpitations. The patient called the [facility] health hotline as she was having palpitations they recommended calling EMS. The patient did take an aspirin but states she does not have any chest pain or difficulty breathing associated with the palpitations. On arrival EMS did capture a supraventricular tachycardia with a rate of about 170. The patient subsequently spontaneously cardioverted back into a sinus rhythm and now on my exam she is asymptomatic. She states she has not had any recent vomiting or diarrhea. Again she denies chest pain and difficulty breathing associated with the palpitations. She does not have any pain or swelling to her lower extremities.    REVIEW OF SYSTEMS  See HPI for further details. All other systems are negative.     PAST MEDICAL HISTORY  Past Medical History:   Diagnosis Date   • Anxiety    • Arthritis    • CAD (coronary artery disease)    • CATARACT      B/L   • Dental disorder     upper and lower dentures   • Dyslipidemia    • Fibromyalgia    • GERD (gastroesophageal reflux disease)    • Glaucoma    • Headache(784.0)    • Hives    • Hives    • HTN    • IBS (irritable bowel syndrome)    • Indigestion    • Myocardial infarct (HCC)     2005   • Pain 7/31/15    all over 3/10 (hx of fibromyalgia   • Palpitations    • Psychiatric problem     depression   • Snoring    • Unspecified disorder of thyroid     hypothroidism, no longer on meds   • Unspecified urinary incontinence        SOCIAL HISTORY  Social History     Social History   • Marital status:      Spouse name: N/A   • Number of children: N/A   • Years of education:  N/A     Social History Main Topics   • Smoking status: Never Smoker   • Smokeless tobacco: Never Used   • Alcohol use No   • Drug use: No   • Sexual activity: No     Other Topics Concern   • Not on file     Social History Narrative   • No narrative on file           PHYSICAL EXAM  VITAL SIGNS: /71   Pulse 86   Temp 36.1 °C (97 °F)   Resp 16   Ht 1.524 m (5')   Wt 58.1 kg (128 lb)   SpO2 92%   BMI 25.00 kg/m²   Constitutional: Well developed, Well nourished, No acute distress, Non-toxic appearance.   HENT: Normocephalic, Atraumatic, tympanic membranes are intact and nonerythematous bilaterally, Oropharynx moist without exudates or erythema, Nose normal.   Eyes: PERRLA, EOMI, Conjunctiva normal.  Neck: Supple without meningismus  Lymphatic: No lymphadenopathy noted.   Cardiovascular: Normal heart rate, Normal rhythm, No murmurs, No rubs, No gallops.   Thorax & Lungs: Normal breath sounds, No respiratory distress, No wheezing, No chest tenderness.   Abdomen: Bowel sounds normal, Soft, No tenderness, no rebound, no guarding, no distention, No masses, No pulsatile masses.   Skin: Warm, Dry, No erythema, No rash.   Back: No tenderness, No CVA tenderness.   Extremities: Atraumatic with symmetric distal pulses, No edema, No tenderness, No cyanosis, No clubbing.   Neurologic: Alert & oriented x 3, cranial nerves II through XII are intact, Normal motor function, Normal sensory function, No focal deficits noted.   Psychiatric: Affect normal, Judgment normal, Mood normal.     EKG  12-lead EKG interpreted by myself shows a normal sinus rhythm with a ventricular rate of 84, the patient does have left ventricular hypertrophy with J-point elevation anteriorly but this is unchanged from prior. The patient also has ST segment depression laterally and inferiorly and again this appears unchanged from prior as well. The patient does not have any dynamic changes from prior.    RADIOLOGY/PROCEDURES  DX-CHEST-PORTABLE (1 VIEW)    Final Result         1.  No acute cardiopulmonary disease.   2.  Calcification of the aortic or tricuspid valve.   3.  Hyperexpansion of lungs favors changes of COPD.            COURSE & MEDICAL DECISION MAKING  Pertinent Labs & Imaging studies reviewed. (See chart for details)  This an 86-year-old female who presents to the emergency department after a bout of supraventricular tachycardia. I did see the rhythm strip and it was at a rate of approximate 150-170. She did spontaneously convert back into a sinus rhythm. The patient does not have any chest pain or difficulty breathing. Her EKG initially was concerning however does not show any dynamic changes from prior. She did not have any chest pain or difficulty with breathing throughout her of that and therefore an ischemic etiology be unlikely. The patient does not have any metabolic derangements that could cause cardiac irritability. The patient has been resting, full throughout her stay in the emergency department. She'll be admitted to the hospital for further observation. The patient's anemia is stable.    FINAL IMPRESSION  1. Supraventricular tachycardia  2. Stable anemia       Disposition  The patient will be admitted in stable condition    Electronically signed by: Miguel Zamudio, 7/24/2018 10:53 PM

## 2018-07-25 NOTE — PROGRESS NOTES
SBAR received from JAYNA Soriano. Pt c/o pain in neck and shoulder-chronic. Warm packs given. Pt with HTN-will medicate per eMAR. POC: HTN management, fall safety, cardiac stress test, skin check, admit, IV fluids.

## 2018-07-25 NOTE — PROGRESS NOTES
Bedside report received from JAYNA Quintanilla. Pt A&Ox4. No complaints of pain or SOB. Placed on tele monitor. SR, 60. BP elevated, otherwise VSS. POC discussed with pt. Pt verbalizes understanding. Call light and belongings within reach. Bed locked and in lowest position. Alarm and fall precautions in place.     Pt /82. MD paged at [phone].   Dr. Taveras responded. New PRN orders in place.

## 2018-07-25 NOTE — CARE PLAN
Problem: Safety  Goal: Will remain free from injury  Outcome: PROGRESSING SLOWER THAN EXPECTED    Intervention: Provide assistance with mobility  Pt unsteady on feet      Goal: Will remain free from falls  Outcome: PROGRESSING AS EXPECTED

## 2018-07-25 NOTE — ED TRIAGE NOTES
Chief Complaint   Patient presents with   • Supraventricular Tachycardia (SVT)     Pt called nurse helath line to report rapid HR up to 117 on her monitor.  EMS found her to be in SVT.  She converted during the EMS visit.  EKG upon arrival showed possible STEMI.       ERP at bedside.

## 2018-07-25 NOTE — ASSESSMENT & PLAN NOTE
On medication regimen as prescribed by physician, however is also taking a thyroid supplement by droplet.

## 2018-07-25 NOTE — ASSESSMENT & PLAN NOTE
Reportedly with SVT on arrival of EMS.  Concern that this could be due to overdosing of thyroid medication versus ischemic cause.  Will evaluate for ischemia with nuclear stress imaging study.  Trend troponin level for now.

## 2018-07-25 NOTE — ED TRIAGE NOTES
PEr ERP.  EKG changes from old infarct.  STEMI Alert cancelled.  PT reports being very stressed out all day and suspects that brought on the episode of rapid HR.

## 2018-07-25 NOTE — H&P
Hospital Medicine History & Physical Note    Date of Service  7/25/2018    Primary Care Physician  Maggi Olivarez M.D.    Consultants  none    Code Status  Full code     Chief Complaint  Palpitations     History of Presenting Illness  86 y.o. female with history of hypothyroidism, on replacement therapy and supplemental replacement therapy, fibromyalgia with pain controlled on opioid therapy, and gastroesophageal reflux disease which is controlled, was in her usual state of health until the day prior to admission.  Without any warning, she developed palpitations and fast heart rate.  Friends who were around, called emergency medical services against her wishes, and it was found that she had supraventricular tachycardia.  This resolved on its own.  She was subsequently told to present to the emergency department for further evaluation.  She currently has no chest pain or shortness of breath, no other complaints.  She does report that for the past 3 months or so she has been supplementing her prescribed thyroid medication with over-the-counter droplets.  She has been doing this because she feels that her thyroid is out of balance despite apparent testing that does not corroborate this.    Review of Systems  Review of Systems   Constitutional: Negative.    HENT: Negative.    Eyes: Negative.    Respiratory: Negative.    Cardiovascular: Positive for palpitations.   Gastrointestinal: Negative.    Genitourinary: Negative.    Musculoskeletal: Negative.    Skin: Negative.    Neurological: Negative.    Endo/Heme/Allergies: Negative.    Psychiatric/Behavioral: Negative.        Past Medical History   has a past medical history of Anxiety; Arthritis; CAD (coronary artery disease); CATARACT; Dental disorder; Dyslipidemia; Fibromyalgia; GERD (gastroesophageal reflux disease); Glaucoma; Headache(784.0); Hives; Hives; HTN; IBS (irritable bowel syndrome); Indigestion; Myocardial infarct (HCC); Pain (7/31/15); Palpitations;  Psychiatric problem; Snoring; Unspecified disorder of thyroid; and Unspecified urinary incontinence. She also has no past medical history of CHF (congestive heart failure) (HCC).    Surgical History   has a past surgical history that includes other; cataract extraction with iol; and toe amputation (Left, 8/6/2015).     Family History  family history includes Heart Disease in her father; Lung Disease in her father.     Social History   reports that she has never smoked. She has never used smokeless tobacco. She reports that she does not drink alcohol or use drugs.    Allergies  Allergies   Allergen Reactions   • Nkda [No Known Drug Allergy]        Medications  Prior to Admission Medications   Prescriptions Last Dose Informant Patient Reported? Taking?   Cholecalciferol (VITAMIN D3) 5000 UNITS TABS 5/25/2018 at Unknown time  Yes No   Sig: Take  by mouth every day.   DULoxetine (CYMBALTA) 60 MG Cap DR Particles delayed-release capsule   No No   Sig: Take 1 Cap by mouth every day.   Ferrous Sulfate (IRON) 325 (65 FE) MG TABS 5/25/2018 at Unknown time  Yes No   Sig: Take  by mouth every 48 hours.   GLUCOSAMINE PO 5/25/2018 at Unknown  Yes No   Sig: Take 1,000 mg by mouth every day.   alprazolam (XANAX) 0.25 MG TABS 7/24/2018 at 1900  No No   Sig: Take 1 Tab by mouth at bedtime as needed for Sleep. [employer] fax # [phone]   diphenhydrAMINE (BENADRYL) 25 MG TABS 5/25/2018 at Unknown time  Yes No   Sig: Take 25 mg by mouth every 6 hours as needed for Sleep.   hydrocodone-acetaminophen (NORCO) 7.5-325 MG per tablet 5/25/2018 at Unknown time  Yes No   Sig: Take 1-2 Tabs by mouth every 6 hours as needed.   latanoprost (XALATAN) 0.005 % SOLN 5/25/2018 at Unknown  Yes No   Sig: Place 1 Drop in both eyes every evening.   levothyroxine (SYNTHROID) 50 MCG TABS 5/25/2018 at Unknown  Yes No   Sig: Take 50 mcg by mouth every day.   lisinopril (PRINIVIL) 10 MG TABS 7/24/2018 at am  Yes No   Sig: Take 20 mg by mouth every day.    ranitidine (ZANTAC) 150 MG TABS 5/25/2018 at Unknown  Yes No   Sig: Take 150 mg by mouth as needed.   sucralfate (CARAFATE) 1 GM Tab 5/25/2018  Yes No   Sig: Take 1 g by mouth 4 Times a Day,Before Meals and at Bedtime.   tizanidine (ZANAFLEX) 2 MG capsule 5/25/2018  Yes No   Sig: Take 2 mg by mouth as needed.      Facility-Administered Medications: None       Physical Exam  Blood Pressure : 143/68   Temperature: 36.1 °C (97 °F)   Pulse: 66   Respiration: 18   Pulse Oximetry: 92 %     Physical Exam   Constitutional: She is oriented to person, place, and time. She appears well-developed and well-nourished. No distress.   HENT:   Head: Normocephalic and atraumatic.   Eyes: Pupils are equal, round, and reactive to light. Conjunctivae are normal.   Neck: Normal range of motion. Neck supple. No tracheal deviation present. No thyromegaly present.   Cardiovascular: Normal rate, regular rhythm and normal heart sounds.  Exam reveals no gallop and no friction rub.    No murmur heard.  Pulmonary/Chest: Effort normal and breath sounds normal. No respiratory distress. She has no wheezes. She has no rales.   Abdominal: Soft. Bowel sounds are normal. She exhibits no distension. There is no tenderness. There is no rebound.   Musculoskeletal: Normal range of motion. She exhibits no edema.   Lymphadenopathy:     She has no cervical adenopathy.   Neurological: She is alert and oriented to person, place, and time. No cranial nerve deficit.   Skin: Skin is warm and dry. She is not diaphoretic.   Psychiatric: She has a normal mood and affect.   Nursing note and vitals reviewed.      Laboratory:  Recent Labs      07/24/18   2248   WBC  5.1   RBC  3.53*   HEMOGLOBIN  10.9*   HEMATOCRIT  33.5*   MCV  94.9   MCH  30.9   MCHC  32.5*   RDW  52.4*   PLATELETCT  238   MPV  10.3     Recent Labs      07/24/18   2248   SODIUM  141   POTASSIUM  3.8   CHLORIDE  110   CO2  22   GLUCOSE  105*   BUN  33*   CREATININE  1.43*   CALCIUM  8.9     Recent  Labs      07/24/18 2248   ALTSGPT  21   ASTSGOT  27   ALKPHOSPHAT  58   TBILIRUBIN  0.3   GLUCOSE  105*     Recent Labs      07/24/18 2248   APTT  28.1   INR  1.12     Recent Labs      07/24/18 2248   BNPBTYPENAT  200*         Lab Results   Component Value Date    TROPONINI 0.02 07/24/2018       Urinalysis:    Lab Results   Component Value Date    SPECGRAVITY 1.014 04/10/2015    GLUCOSEUR Negative 04/10/2015    KETONES Negative 04/10/2015    NITRITE Negative 04/10/2015        Imaging:  DX-CHEST-PORTABLE (1 VIEW)   Final Result         1.  No acute cardiopulmonary disease.   2.  Calcification of the aortic or tricuspid valve.   3.  Hyperexpansion of lungs favors changes of COPD.      NM-CARDIAC STRESS TEST    (Results Pending)         Assessment/Plan:  I anticipate this patient is appropriate for observation status at this time.    * Tachyarrhythmia- (present on admission)   Assessment & Plan    Reportedly with SVT on arrival of EMS.  Concern that this could be due to overdosing of thyroid medication versus ischemic cause.  Will evaluate for ischemia with nuclear stress imaging study.  Trend troponin level for now.        Normocytic anemia   Assessment & Plan    Likely of chronic disease.  Monitor.  No evidence of bleed.         Hypothyroidism   Assessment & Plan    On medication regimen as prescribed by physician, however is also taking a thyroid supplement by droplet.        SUMA (acute kidney injury) ([facility])   Assessment & Plan    Acute versus acute on chronic kidney injury.  Avoid nephrotoxins.  Monitor        IBS (irritable bowel syndrome)- (present on admission)   Assessment & Plan    Controlled.  Monitor        Fibromyalgia- (present on admission)   Assessment & Plan    Controlled with current medication regimen including Vicodin daily.            VTE prophylaxis: SCD, lovenox

## 2018-07-25 NOTE — ED NOTES
Olga fall assessment completed. Pt is High risk for fall. Interventions complete. Pt placed in yellow non slip socks, wrist band placed, green sign on door. Bed locked in low position, call light in place. Personal possessions in place.  Personal needs assessed. Safety assessed. Will monitor frequently

## 2018-07-26 ENCOUNTER — APPOINTMENT (OUTPATIENT)
Dept: RADIOLOGY | Facility: MEDICAL CENTER | Age: 83
End: 2018-07-26
Attending: HOSPITALIST
Payer: MEDICARE

## 2018-07-26 VITALS
OXYGEN SATURATION: 97 % | SYSTOLIC BLOOD PRESSURE: 156 MMHG | BODY MASS INDEX: 24.11 KG/M2 | WEIGHT: 122.8 LBS | HEIGHT: 60 IN | DIASTOLIC BLOOD PRESSURE: 62 MMHG | TEMPERATURE: 98.1 F | HEART RATE: 54 BPM | RESPIRATION RATE: 16 BRPM

## 2018-07-26 PROBLEM — R00.1 SINUS BRADYCARDIA: Status: ACTIVE | Noted: 2018-07-26

## 2018-07-26 LAB
ALBUMIN SERPL BCP-MCNC: 3.3 G/DL (ref 3.2–4.9)
BUN SERPL-MCNC: 35 MG/DL (ref 8–22)
CALCIUM SERPL-MCNC: 8.2 MG/DL (ref 8.5–10.5)
CHLORIDE SERPL-SCNC: 114 MMOL/L (ref 96–112)
CO2 SERPL-SCNC: 20 MMOL/L (ref 20–33)
CREAT SERPL-MCNC: 1.18 MG/DL (ref 0.5–1.4)
ERYTHROCYTE [DISTWIDTH] IN BLOOD BY AUTOMATED COUNT: 52.7 FL (ref 35.9–50)
GLUCOSE SERPL-MCNC: 105 MG/DL (ref 65–99)
HCT VFR BLD AUTO: 30.2 % (ref 37–47)
HGB BLD-MCNC: 9.8 G/DL (ref 12–16)
MCH RBC QN AUTO: 30.5 PG (ref 27–33)
MCHC RBC AUTO-ENTMCNC: 32.5 G/DL (ref 33.6–35)
MCV RBC AUTO: 94.1 FL (ref 81.4–97.8)
PHOSPHATE SERPL-MCNC: 3 MG/DL (ref 2.5–4.5)
PLATELET # BLD AUTO: 210 K/UL (ref 164–446)
PMV BLD AUTO: 10.3 FL (ref 9–12.9)
POTASSIUM SERPL-SCNC: 4.1 MMOL/L (ref 3.6–5.5)
RBC # BLD AUTO: 3.21 M/UL (ref 4.2–5.4)
SODIUM SERPL-SCNC: 141 MMOL/L (ref 135–145)
WBC # BLD AUTO: 6.5 K/UL (ref 4.8–10.8)

## 2018-07-26 PROCEDURE — 80069 RENAL FUNCTION PANEL: CPT

## 2018-07-26 PROCEDURE — 700111 HCHG RX REV CODE 636 W/ 250 OVERRIDE (IP)

## 2018-07-26 PROCEDURE — 96372 THER/PROPH/DIAG INJ SC/IM: CPT

## 2018-07-26 PROCEDURE — 700111 HCHG RX REV CODE 636 W/ 250 OVERRIDE (IP): Performed by: HOSPITALIST

## 2018-07-26 PROCEDURE — A9270 NON-COVERED ITEM OR SERVICE: HCPCS | Performed by: HOSPITALIST

## 2018-07-26 PROCEDURE — 99217 PR OBSERVATION CARE DISCHARGE: CPT | Performed by: HOSPITALIST

## 2018-07-26 PROCEDURE — 700102 HCHG RX REV CODE 250 W/ 637 OVERRIDE(OP): Performed by: HOSPITALIST

## 2018-07-26 PROCEDURE — 36415 COLL VENOUS BLD VENIPUNCTURE: CPT

## 2018-07-26 PROCEDURE — 90471 IMMUNIZATION ADMIN: CPT

## 2018-07-26 PROCEDURE — 90670 PCV13 VACCINE IM: CPT | Performed by: HOSPITALIST

## 2018-07-26 PROCEDURE — 85027 COMPLETE CBC AUTOMATED: CPT

## 2018-07-26 PROCEDURE — 700105 HCHG RX REV CODE 258: Performed by: HOSPITALIST

## 2018-07-26 PROCEDURE — G0378 HOSPITAL OBSERVATION PER HR: HCPCS

## 2018-07-26 PROCEDURE — A9502 TC99M TETROFOSMIN: HCPCS

## 2018-07-26 RX ORDER — HYDROCODONE BITARTRATE AND ACETAMINOPHEN 5; 325 MG/1; MG/1
1 TABLET ORAL EVERY 4 HOURS PRN
Status: DISCONTINUED | OUTPATIENT
Start: 2018-07-26 | End: 2018-07-27 | Stop reason: HOSPADM

## 2018-07-26 RX ORDER — REGADENOSON 0.08 MG/ML
INJECTION, SOLUTION INTRAVENOUS
Status: COMPLETED
Start: 2018-07-26 | End: 2018-07-26

## 2018-07-26 RX ORDER — HYDROCODONE BITARTRATE AND ACETAMINOPHEN 5; 325 MG/1; MG/1
1 TABLET ORAL ONCE
Status: COMPLETED | OUTPATIENT
Start: 2018-07-26 | End: 2018-07-26

## 2018-07-26 RX ORDER — HYDROCODONE BITARTRATE AND ACETAMINOPHEN 5; 325 MG/1; MG/1
1 TABLET ORAL EVERY 4 HOURS PRN
Status: DISCONTINUED | OUTPATIENT
Start: 2018-07-26 | End: 2018-07-26

## 2018-07-26 RX ORDER — HYDROCODONE BITARTRATE AND ACETAMINOPHEN 5; 325 MG/1; MG/1
0.5 TABLET ORAL ONCE
Status: COMPLETED | OUTPATIENT
Start: 2018-07-26 | End: 2018-07-26

## 2018-07-26 RX ORDER — HYDROCODONE BITARTRATE AND ACETAMINOPHEN 5; 325 MG/1; MG/1
0.5 TABLET ORAL EVERY 4 HOURS PRN
Status: DISCONTINUED | OUTPATIENT
Start: 2018-07-26 | End: 2018-07-26

## 2018-07-26 RX ORDER — HYDROCODONE BITARTRATE AND ACETAMINOPHEN 5; 325 MG/1; MG/1
0.5 TABLET ORAL EVERY 4 HOURS PRN
Status: DISCONTINUED | OUTPATIENT
Start: 2018-07-26 | End: 2018-07-27 | Stop reason: HOSPADM

## 2018-07-26 RX ADMIN — PNEUMOCOCCAL 13-VALENT CONJUGATE VACCINE 0.5 ML: 2.2; 2.2; 2.2; 2.2; 2.2; 4.4; 2.2; 2.2; 2.2; 2.2; 2.2; 2.2; 2.2 INJECTION, SUSPENSION INTRAMUSCULAR at 05:23

## 2018-07-26 RX ADMIN — ASPIRIN 325 MG: 325 TABLET ORAL at 05:20

## 2018-07-26 RX ADMIN — REGADENOSON 0.4 MG: 0.08 INJECTION, SOLUTION INTRAVENOUS at 13:58

## 2018-07-26 RX ADMIN — HYDROCODONE BITARTRATE AND ACETAMINOPHEN 2 TABLET: 7.5; 325 TABLET ORAL at 05:20

## 2018-07-26 RX ADMIN — CLONIDINE HYDROCHLORIDE 0.2 MG: 0.1 TABLET ORAL at 05:21

## 2018-07-26 RX ADMIN — LEVOTHYROXINE SODIUM 50 MCG: 50 TABLET ORAL at 05:20

## 2018-07-26 RX ADMIN — DULOXETINE HYDROCHLORIDE 60 MG: 60 CAPSULE, DELAYED RELEASE ORAL at 05:20

## 2018-07-26 RX ADMIN — LISINOPRIL 20 MG: 20 TABLET ORAL at 05:21

## 2018-07-26 RX ADMIN — HYDROCODONE BITARTRATE AND ACETAMINOPHEN 0.5 TABLET: 5; 325 TABLET ORAL at 14:59

## 2018-07-26 RX ADMIN — ENOXAPARIN SODIUM 30 MG: 100 INJECTION SUBCUTANEOUS at 05:19

## 2018-07-26 RX ADMIN — HYDROCODONE BITARTRATE AND ACETAMINOPHEN 1 TABLET: 5; 325 TABLET ORAL at 19:44

## 2018-07-26 RX ADMIN — HYDROCODONE BITARTRATE AND ACETAMINOPHEN 1 TABLET: 5; 325 TABLET ORAL at 14:59

## 2018-07-26 RX ADMIN — ACETAMINOPHEN 650 MG: 325 TABLET, FILM COATED ORAL at 12:13

## 2018-07-26 RX ADMIN — SODIUM CHLORIDE: 9 INJECTION, SOLUTION INTRAVENOUS at 07:17

## 2018-07-26 ASSESSMENT — PAIN SCALES - GENERAL
PAINLEVEL_OUTOF10: 7
PAINLEVEL_OUTOF10: 0
PAINLEVEL_OUTOF10: 10
PAINLEVEL_OUTOF10: 4
PAINLEVEL_OUTOF10: 0
PAINLEVEL_OUTOF10: 5
PAINLEVEL_OUTOF10: 8
PAINLEVEL_OUTOF10: 0

## 2018-07-26 NOTE — PROGRESS NOTES
Bedside report received. Pt A&Ox4. No complaints of pain or SOB. RA. VSS. POC discussed with pt. Pt verbalizes understanding. Call light and belongings within reach. Bed locked and in lowest position. Alarm and fall precautions in place.

## 2018-07-26 NOTE — CARE PLAN
Problem: Communication  Goal: The ability to communicate needs accurately and effectively will improve  Outcome: PROGRESSING AS EXPECTED      Problem: Safety  Goal: Will remain free from injury  Outcome: PROGRESSING AS EXPECTED      Problem: Bowel/Gastric:  Goal: Normal bowel function is maintained or improved  Outcome: PROGRESSING AS EXPECTED      Problem: Fluid Volume:  Goal: Will maintain balanced intake and output  Outcome: PROGRESSING AS EXPECTED

## 2018-07-26 NOTE — PROGRESS NOTES
Assumed care of patient, bedside report received from Desire. Updated on POC, call light within reach and fall precautions in place. Bed locked and in lowest position. Patient instructed to call for assistance before getting out of bed. All questions answered, no other needs at this time.

## 2018-07-26 NOTE — PROGRESS NOTES
Skin Check: R posterior ear-red, blanchable: RAC-hematoma; R Buttock near glutteal cleft-red scar; BLE bruising; R lateral foot callus.

## 2018-07-26 NOTE — PROGRESS NOTES
Admitted today for SVT  Monitoring on telemetry  Also monitoring creatinine  MPI tomorrow  Currently no chest pain or palpitations

## 2018-07-27 NOTE — PROGRESS NOTES
Monitor summary: SB-SR 40-68, josefina down to 39 non sustained.     Measurements: .22/.14/.46

## 2018-07-27 NOTE — DISCHARGE SUMMARY
Hospital Medicine Discharge Note     Admit Date:  7/24/2018       Discharge Date:   7/26/2018    Attending Physician:  Phu Phillips     Diagnoses (includes active and resolved):   Principal Problem:    Tachyarrhythmia POA: Yes  Active Problems:    Fibromyalgia POA: Yes    IBS (irritable bowel syndrome) POA: Yes    SUMA (acute kidney injury) ([facility]) POA: Unknown    Hypothyroidism POA: Unknown    Normocytic anemia POA: Unknown    Sinus bradycardia POA: Unknown  Resolved Problems:    * No resolved hospital problems. *      Hospital Summary (Brief Narrative):       86 y.o. female w/h/o hypothyroidism, on replacement therapy and supplemental replacement therapy, fibromyalgia with pain controlled on opioid therapy, and gastroesophageal reflux disease which is controlled, was in her usual state of health until the day prior to admission.  Without any warning, she developed palpitations and fast heart rate.  Friends who were around, called emergency medical services against her wishes, and it was found that she had supraventricular tachycardia. This resolved on its own.   She was admitted for workup. She had no further SVT on the tele. She had a stress test that was negative. Troponin trend was unremarkable. She did develop some sinus bradycardia with no evidence of heart block. She did not have dizziness. However, her daughter says sometimes the patient does have weakness at home and sometimes falls down. She was able to be discharged home but was also referred to cardiology outpatient.     Consultants:      None    Procedures:        None    Discharge Medications:           Medication List      CONTINUE taking these medications      Instructions   ALPRAZolam 0.25 MG Tabs  Commonly known as:  XANAX   Take 1 Tab by mouth at bedtime as needed for Sleep. Quentin fax # [phone]  Dose:  0.25 mg     brinzolamide 1 % Susp  Commonly known as:  AZOPT   Place 1 Drop in both eyes 2 Times a Day.  Dose:  1 Drop     DULoxetine 60 MG Cpep  delayed-release capsule  Commonly known as:  CYMBALTA   Take 1 Cap by mouth every day.  Dose:  60 mg     GLUCOSAMINE PO   Take 1,000 mg by mouth every day.  Dose:  1000 mg     HYDROcodone-acetaminophen 7.5-325 MG per tablet  Commonly known as:  NORCO   Take 1 Tab by mouth 3 times a day as needed for Severe Pain.  Dose:  1 Tab     Iron 325 (65 Fe) MG Tabs   Take  by mouth every 48 hours.     levothyroxine 50 MCG Tabs  Commonly known as:  SYNTHROID   Take 50 mcg by mouth Every morning on an empty stomach.  Dose:  50 mcg     * lisinopril 10 MG Tabs  Commonly known as:  PRINIVIL   Take 10 mg by mouth every bedtime.  Dose:  10 mg     * lisinopril 20 MG Tabs  Commonly known as:  PRINIVIL   Take 20 mg by mouth every morning.  Dose:  20 mg     tizanidine 2 MG tablet  Commonly known as:  ZANAFLEX   Take 2 mg by mouth 1 time daily as needed.  Dose:  2 mg     XALATAN 0.005 % Soln  Generic drug:  latanoprost   Place 1 Drop in both eyes every evening.  Dose:  1 Drop        * This list has 2 medication(s) that are the same as other medications prescribed for you. Read the directions carefully, and ask your doctor or other care provider to review them with you.              Disposition:   Discharge home    Diet:   Regular    Activity:   As tolerated    Code status:   Full code    Primary Care Provider:    Maggi Olivarez M.D.    Follow up appointment details :      Maggi Olivarez M.D.  [address]  [facility] [address]  [facility] [id number]  [phone]      As needed    Future Appointments  Date Time Provider Department Center   7/30/2018 11:20 AM CARE MANAGER [facility]   8/1/2018 3:00 PM ABEL [facility]   8/27/2018 3:20 PM Maggi Olivarez M.D. [address]   10/29/2018 11:15 AM Linda Camp M.D. [facility] None       Pending Studies:        None    #################################################    Interval history/exam for day of discharge:    Vitals:    07/26/18 0626 07/26/18 0845 07/26/18 1255 07/26/18  1720   BP: 127/59 111/53 146/64 156/62   Pulse:  (!) 46 (!) 48 (!) 54   Resp:  16 16 16   Temp:  36.1 °C (96.9 °F) 36.2 °C (97.1 °F) 36.7 °C (98.1 °F)   SpO2:  96% 96% 97%   Weight:       Height:         Weight/BMI: Body mass index is 23.98 kg/m².  Pulse Oximetry: 97 %, O2 (LPM): 0, O2 Delivery: None (Room Air)    General:  NAD  CVS: Bradycardic  PULM: CTAB, no respiratory distress    Most Recent Labs:    Lab Results   Component Value Date/Time    WBC 6.5 07/26/2018 02:20 AM    WBC 5.8 11/04/2010 01:16 PM    RBC 3.21 (L) 07/26/2018 02:20 AM    RBC 4.04 11/04/2010 01:16 PM    HEMOGLOBIN 9.8 (L) 07/26/2018 02:20 AM    HEMATOCRIT 30.2 (L) 07/26/2018 02:20 AM    MCV 94.1 07/26/2018 02:20 AM    MCV 96 11/04/2010 01:16 PM    MCH 30.5 07/26/2018 02:20 AM    MCH 31.2 11/04/2010 01:16 PM    MCHC 32.5 (L) 07/26/2018 02:20 AM    MPV 10.3 07/26/2018 02:20 AM    NEUTSPOLYS 41.70 (L) 07/24/2018 10:48 PM    LYMPHOCYTES 46.30 (H) 07/24/2018 10:48 PM    MONOCYTES 9.80 07/24/2018 10:48 PM    EOSINOPHILS 1.60 07/24/2018 10:48 PM    BASOPHILS 0.40 07/24/2018 10:48 PM      Lab Results   Component Value Date/Time    SODIUM 141 07/26/2018 02:20 AM    POTASSIUM 4.1 07/26/2018 02:20 AM    CHLORIDE 114 (H) 07/26/2018 02:20 AM    CO2 20 07/26/2018 02:20 AM    GLUCOSE 105 (H) 07/26/2018 02:20 AM    BUN 35 (H) 07/26/2018 02:20 AM    CREATININE 1.18 07/26/2018 02:20 AM    CREATININE 1.21 (H) 12/10/2010 02:40 PM    BUNCREATRAT 18 11/04/2010 01:16 PM    GLOMRATE 43 (L) 12/10/2010 02:40 PM      Lab Results   Component Value Date/Time    ALTSGPT 21 07/24/2018 10:48 PM    ASTSGOT 27 07/24/2018 10:48 PM    ALKPHOSPHAT 58 07/24/2018 10:48 PM    TBILIRUBIN 0.3 07/24/2018 10:48 PM    ALBUMIN 3.3 07/26/2018 02:20 AM    GLOBULIN 2.2 07/24/2018 10:48 PM    INR 1.12 07/24/2018 10:48 PM     Lab Results   Component Value Date/Time    PROTHROMBTM 14.1 07/24/2018 10:48 PM    INR 1.12 07/24/2018 10:48 PM        Imaging/ Testing:      NM-CARDIAC STRESS TEST    Final Result   Addendum 1 of 1   Chemical Stress ECG Interpretation Only:      Baseline ECG: normal sinus rhythm with left bundle branch block.    No arrhythmias.   There were no ischemic ECG changes with chemical stress.      Samuel Camp MD   Cardiologist, [facility] [place]             Final      DX-CHEST-PORTABLE (1 VIEW)   Final Result         1.  No acute cardiopulmonary disease.   2.  Calcification of the aortic or tricuspid valve.   3.  Hyperexpansion of lungs favors changes of COPD.          Instructions:      The patient was instructed to return to the ER in the event of worsening symptoms. I have counseled the patient on the importance of compliance and the patient has agreed to proceed with all medical recommendations and follow up plan indicated above.   The patient understands that all medications come with benefits and risks. Risks may include permanent injury or death and these risks can be minimized with close reassessment and monitoring.

## 2018-07-27 NOTE — PROGRESS NOTES
Pt and daughter at bedside provided discharge paperwork and instructions. Verbalize understanding. PIV and tele monitor already removed. Pt alert and orientated x4, and will leave unit soon (when pt and family are ready to leave) via wheelchair, no oxygen home with family. NOC RN Trina notified pt is ready for transport.

## 2018-07-27 NOTE — DISCHARGE INSTRUCTIONS
Discharge Instructions    Discharged to home by car with relative. Discharged via wheelchair, hospital escort: Yes.  Special equipment needed: Not Applicable    Be sure to schedule a follow-up appointment with your primary care doctor or any specialists as instructed.     Discharge Plan:   Diet Plan: Discussed  Activity Level: Discussed  Confirmed Follow up Appointment: Patient to Call and Schedule Appointment  Confirmed Symptoms Management: Discussed  Medication Reconciliation Updated: Yes  Pneumococcal Vaccine Administered/Refused: Given (See MAR)  Influenza Vaccine Indication: Indicated: Not available from distributor/    I understand that a diet low in cholesterol, fat, and sodium is recommended for good health. Unless I have been given specific instructions below for another diet, I accept this instruction as my diet prescription.   Other diet: regular    Special Instructions: None    · Is patient discharged on Warfarin / Coumadin?   No     Depression / Suicide Risk    As you are discharged from this [facility] facility, it is important to learn how to keep safe from harming yourself.    Recognize the warning signs:  · Abrupt changes in personality, positive or negative- including increase in energy   · Giving away possessions  · Change in eating patterns- significant weight changes-  positive or negative  · Change in sleeping patterns- unable to sleep or sleeping all the time   · Unwillingness or inability to communicate  · Depression  · Unusual sadness, discouragement and loneliness  · Talk of wanting to die  · Neglect of personal appearance   · Rebelliousness- reckless behavior  · Withdrawal from people/activities they love  · Confusion- inability to concentrate     If you or a loved one observes any of these behaviors or has concerns about self-harm, here's what you can do:  · Talk about it- your feelings and reasons for harming yourself  · Remove any means that you might use to hurt yourself  (examples: pills, rope, extension cords, firearm)  · Get professional help from the community (Mental Health, Substance Abuse, psychological counseling)  · Do not be alone:Call your Safe Contact- someone whom you trust who will be there for you.  · Call your local CRISIS HOTLINE 777-1411 or 530-453-8205  · Call your local Children's Mobile Crisis Response Team [place] (216) 616-9554 or www.[employer]  · Call the toll free National Suicide Prevention Hotlines   · National Suicide Prevention Lifeline 845-386-QKLV (3152)  · [employer] Hope Line Network 800-SUICIDE (676-8148)          Chest Pain, Nonspecific  It is often hard to give a specific diagnosis for the cause of chest pain. There is always a chance that your pain could be related to something serious, like a heart attack or a blood clot in the lungs. You need to follow up with your caregiver for further evaluation. More lab tests or other studies such as X-rays, electrocardiography, stress testing, or cardiac imaging may be needed to find the cause of your pain.  Most of the time, nonspecific chest pain improves within 2 to 3 days with rest and mild pain medicine. For the next few days, avoid physical exertion or activities that bring on pain. Do not smoke. Avoid drinking alcohol. Call your caregiver for routine follow-up as advised.   SEEK IMMEDIATE MEDICAL CARE IF:  · You develop increased chest pain or pain that radiates to the arm, neck, jaw, back, or abdomen.   · You develop shortness of breath, increased coughing, or you start coughing up blood.   · You have severe back or abdominal pain, nausea, or vomiting.   · You develop severe weakness, fainting, fever, or chills.   Document Released: 12/18/2006 Document Revised: 03/11/2013 Document Reviewed: 06/06/2008  ExitCare® Patient Information ©2013 [employer].      Supraventricular Tachycardia, Adult  Supraventricular tachycardia (SVT) is a kind of abnormal heartbeat. It makes your heart beat  "very fast and then beat at a normal speed.  A normal heart beats  times a minute. This condition can make your heart beat more than 150 times a minute. Times of having a fast heartbeat (episodes) can be scary, but they are usually not dangerous. They can lead to problems if:  · They happen often.  · They last a long time.  Symptoms of this condition include:  · A pounding heart.  · A feeling that your heart is skipping beats (palpitations).  · Weakness.  · Trouble getting enough air (shortness of breath).  · Pain or tightness in your chest.  · Feeling like you are going to pass out (light-headedness).  · Feeling worried or nervous (anxiety).  · Dizziness.  · Sweating.  · Feeling sick to your stomach (nausea).  · Passing out (fainting).  · Tiredness.  Sometimes, there are no symptoms.  Follow these instructions at home:  Stress  · Avoid things that make you feel stressed.  · Find out what helps you feel less stressed. Try:  ¨ Doing a relaxing activity, like yoga, meditation, or being out in nature.  ¨ Listening to relaxing music.  ¨ Doing relaxation techniques, like deep breathing.  ¨ Taking steps to be healthy. These include getting lots of sleep, exercising, and eating a balanced diet.  ¨ Talking with a mental health doctor.  Sleep  · Try to get at least 7 hours of sleep each night.  Tobacco and nicotine  · Do not use anything that has nicotine or tobacco, such as cigarettes and e-cigarettes. If you need help quitting, ask your doctor.  Alcohol  · If alcohol gives you a fast heartbeat, do not drink alcohol.  · If alcohol does not seem to give you a fast heartbeat, limit your alcohol. For nonpregnant women, this means no more than 1 drink a day. For men, this means no more than 2 drinks a day. \"One drink\" means one of these:  ¨ 12 oz of beer.  ¨ 5 oz of wine.  ¨ 1½ oz of hard liquor.  Caffeine  · If caffeine gives you a fast heartbeat, do not eat, drink, or use anything with caffeine in it.  · If caffeine does " not seem to give you a fast heartbeat, limit how much caffeine you eat, drink, or use.  Stimulant drugs  · Do not use stimulant drugs. These are drugs like cocaine or methamphetamine. If you need help quitting, ask your doctor.  General instructions  · Stay at a healthy weight.  · Exercise regularly. Ask your doctor to suggest some good activities for you. Try one of these options:  ¨ 150 minutes a week of gentle exercise, like walking or yoga.  ¨ 75 minutes a week of exercise that is very active, like running or swimming.  ¨ A combination of gentle exercise and very active exercise.  · Do home treatments to slow down your heartbeat as told by your doctor.  · Take over-the-counter and prescription medicines only as told by your doctor.  Contact a doctor if:  · You have a fast heartbeat more often.  · Times of having a fast heartbeat last longer than before.  · Your home treatments to slow down your heartbeat do not help.  · You have new symptoms.  Get help right away if:  · You have chest pain.  · Your symptoms get worse.  · You have trouble breathing.  · Your heart beats very fast for more than 20 minutes.  · You pass out (faint).  These symptoms may be an emergency. Do not wait to see if the symptoms will go away. Get medical help right away. Call your local emergency services (911 in the [place] Do not drive yourself to the hospital.   This information is not intended to replace advice given to you by your health care provider. Make sure you discuss any questions you have with your health care provider.  Document Released: 12/18/2006 Document Revised: 08/24/2017 Document Reviewed: 08/24/2017  [employer] Interactive Patient Education © 2017 [employer]

## 2018-07-30 ENCOUNTER — TELEPHONE (OUTPATIENT)
Dept: HEALTH INFORMATION MANAGEMENT | Facility: OTHER | Age: 83
End: 2018-07-30

## 2018-07-30 ENCOUNTER — PATIENT OUTREACH (OUTPATIENT)
Dept: HEALTH INFORMATION MANAGEMENT | Facility: OTHER | Age: 83
End: 2018-07-30

## 2018-07-30 DIAGNOSIS — Z79.899 MEDICATION MANAGEMENT: ICD-10-CM

## 2018-07-30 NOTE — TELEPHONE ENCOUNTER
Patient called CM to report she has been checking her B/P readings at home. States she had a reading today of 205/79 and recently rechecked and reading was 195/76. Patient states she is using her spouse's B/P cuff and states cuff is a little big. Patient states she did have a headache earlier today. Denies any chest pain or shortness of breath. Pt states she did take her lisinopril earlier today.  CM recommended patient go to ER for evaluation of elevated B/P.  Pt states she doesn't want to go to ER. States she will continue to monitor B/P.  CM offered patient appt tomorrow with PCP. Pt declining stating she is going to her pain management provider. Pt is scheduled to see PCP at discharge clinic on 8/1/18. CM did referral earlier today to pharmacist for medication review. CM will route message to PCP.

## 2018-07-31 ENCOUNTER — TELEPHONE (OUTPATIENT)
Dept: HEALTH INFORMATION MANAGEMENT | Facility: OTHER | Age: 83
End: 2018-07-31

## 2018-07-31 DIAGNOSIS — I15.9 SECONDARY HYPERTENSION: ICD-10-CM

## 2018-07-31 RX ORDER — LISINOPRIL 20 MG/1
20 TABLET ORAL EVERY MORNING
Qty: 90 TAB | Refills: 3 | Status: SHIPPED | OUTPATIENT
Start: 2018-07-31 | End: 2018-09-18 | Stop reason: SDUPTHER

## 2018-07-31 NOTE — TELEPHONE ENCOUNTER
Was the patient seen in the last year in this department? Yes    Does patient have an active prescription for medications requested? Yes    Received Request Via: Patient     Patient needs refill for 40 mg

## 2018-07-31 NOTE — TELEPHONE ENCOUNTER
CM informed patient PCP recommended increasing lisinopril to 40 mg daily. Pt verbalized understanding. Pt scheduled for follow-up at discharge clinic on 8/1/18.

## 2018-07-31 NOTE — TELEPHONE ENCOUNTER
Dear Dr. Olivarez,    Patient has reported elevated home BP readings. If you feel she would benefit from remote home monitoring for HTN, please consider signing pended order.     Thank you,  Kasi Harper, PharmD [phone]

## 2018-07-31 NOTE — PROGRESS NOTES
Referral from Tiara Galeas stating patient had high BP readings after taking BP on 's BP monitor. Outbound call to patient to discuss. Anthony denies any SOB, CP or current headache. She reports having a HA earlier in the day, however this resolved after taking a hydrocodone. She retook her BP while I spoke to her on the phone and it has dropped to 177/65 using her appropriately sized cuff. She reports taking her second dose of lisinopril about 15 minutes before my call. She has an o/v with Dr. Drake on 8/1. Patient reports 97% adherence to her medication schedule. She states she sleeps until 12 pm and goes to bed at 12 am. She takes her AM meds around noon and  PM meds around 5pm. Patient reports bedtime is close to midnight. Advised patient to move evening meds to bedtime. Patient is interested in [facility] for BP. Will discuss with PCP. Patient is out of Cymbalta. States she takes both 30mg and 60mg caps once daily. Outbound call to [facility] pharmacy. Per pharmacy technician, they have 60mg ready for  and will fill 30mg capsules today.     Patient states she can afford her medications, however her  is diabetic and has many medications that he cannot afford. Will check eligibility for CC and refer to [facility].

## 2018-08-01 ENCOUNTER — OFFICE VISIT (OUTPATIENT)
Dept: MEDICAL GROUP | Facility: CLINIC | Age: 83
End: 2018-08-01
Payer: MEDICARE

## 2018-08-01 VITALS
DIASTOLIC BLOOD PRESSURE: 50 MMHG | WEIGHT: 120 LBS | RESPIRATION RATE: 14 BRPM | BODY MASS INDEX: 23.56 KG/M2 | HEART RATE: 74 BPM | SYSTOLIC BLOOD PRESSURE: 130 MMHG | HEIGHT: 60 IN | OXYGEN SATURATION: 95 % | TEMPERATURE: 97 F

## 2018-08-01 DIAGNOSIS — N18.30 CKD (CHRONIC KIDNEY DISEASE) STAGE 3, GFR 30-59 ML/MIN (HCC): ICD-10-CM

## 2018-08-01 DIAGNOSIS — R01.1 HEART MURMUR: ICD-10-CM

## 2018-08-01 DIAGNOSIS — I47.10 PAROXYSMAL SVT (SUPRAVENTRICULAR TACHYCARDIA): ICD-10-CM

## 2018-08-01 DIAGNOSIS — Z09 HOSPITAL DISCHARGE FOLLOW-UP: ICD-10-CM

## 2018-08-01 DIAGNOSIS — I10 HYPERTENSION, UNCONTROLLED: ICD-10-CM

## 2018-08-01 PROCEDURE — 99214 OFFICE O/P EST MOD 30 MIN: CPT | Performed by: FAMILY MEDICINE

## 2018-08-01 RX ORDER — AMLODIPINE BESYLATE 2.5 MG/1
2.5 TABLET ORAL DAILY
Qty: 30 TAB | Refills: 0 | Status: SHIPPED | OUTPATIENT
Start: 2018-08-01 | End: 2018-08-13

## 2018-08-01 NOTE — PATIENT INSTRUCTIONS
If you need further assistance, or have any questions; concerns or lingering symptoms before seeing your Primary Care Provider or specialist.     Do not hesitate to contact us.     Please contact us at the Post-Hospital Follow Up Program at [phone].   Our offices hours are Monday-Friday 8 am-5 pm.

## 2018-08-01 NOTE — PROGRESS NOTES
Subjective:     Anthony Lynn is a 86 y.o. female who presents for Hospital Follow-up.  Chart and discharge summary reviewed.   Date of discharge 7/26/18.  48- hour post discharge RN call completed on 7/30/18 and documented in the medical record by Tiara Galeas RN:  POST DISCHARGE CALL:  Discharge Date:7/26/2018   Date of Outreach Call: 7/30/2018 11:11 AM  Now that you're home, how are you doing? Good  Comment:Pt reports feeling better. States blood pressure  reading last night was 179/54. Pt states she doesn't  consistently take her medications. Reports she is forgetful.  CM recommended pt get a pill box. Referral to pharmacist for  review of meds.  Do you have questions about your medications? Yes  Comment:Pt states she is needing a review of all her  medications. States she changed pharmacies and has been  disorganized with medication management    Did you fill your medications? No  Comment:No new medications ordered    Do you have a follow-up appointment scheduled?Yes    Discharging Department: Telemetry 8    Number of Attempts: 1  Current or previous attempts completed within two business days of discharge? Yes  Provided education regarding treatment plan, medication, self-management, ADLs? Yes  Comment:Education reinforced on taking medications as  directed. Pt will monitor her B/P. CM recommended pt notify  her PCP today if B/P remains elevated.  Has patient completed Advance Directive? If yes, advise them to bring to appointment. No  Care Manager phone number provided? Yes  Is there anything else I can help you with? No        HPI: Recently hospitalized for fast heart rate and she was found to be in SVT which spontaneously resolved on its own.  She says this has happened in the past.  She was evaluated with cardiac monitoring and a stress test.  Her blood pressure medication was not adjusted.  She was noted to develop some sinus bradycardia with no evidence of heart block.      Prior to her  hospitalization, her nephrologist, Dr. Johnson, increased her lisinopril from 20 mg daily to 20 mg in the morning and 10 mg in the evening.    Since returning home, patient reports that her blood pressure continues to be elevated.  Her PCP, Dr. Olivarez, was contacted and he recommended that she take lisinopril 40 mg daily but appears not to be aware that she was taking 30 mg daily instead of 20 mg daily.     The patient has questions regarding hospitalization or discharge: All of her questions were answered  The patient sometimes has weakness; denies difficulty taking care of self at home.  Patient reports taking medications as instructed.      Allergies:   Nkda [no known drug allergy]    Social History:  Social History   Substance Use Topics   • Smoking status: Never Smoker   • Smokeless tobacco: Never Used   • Alcohol use No        ROS:    Constitutional:  Denies fever, chills, night sweats, new fatigue or malaise  HENT: Denies head congestion, ear pain or drainage, decreased hearing, sore throat  Eyes: Denies visual changes, eye drainage or redness, eye pain  Neck: Denies pain, swollen glands, decreased range of motion  Lungs: Denies shortness of breath, wheezing, cough  Cardiovascular: Denies chest pain, orthopnea, lower extremity edema, palpitations  Abdominal: Denies abdominal pain, change in bowel or bladder habits, nausea or vomiting  Musculoskeletal: Patient has a history of fibromyalgia and takes chronic opioids.  Endocrine: Denies unexplained weight changes, heat or cold intolerance, hair loss, polyuria or polydipsia  Neurological: Denies dizziness, headache, confusion, focal weakness or numbness, memory loss  Psychiatric: She is treated for depression with Cymbalta.       Objective:     Blood pressure 130/50, pulse 74, temperature 36.1 °C (97 °F), resp. rate 14, height 1.524 m (5'), weight 54.4 kg (120 lb), SpO2 95 %.     Physical Exam:    GEN:  Alert, oriented, in no distress  HEENT:  PERRLA, EOMI  NECK;   Supple without adenopathy   LUNGS:  Clear to auscultation without rales, rhonci, or wheezes.  CV:  Heart RRR with 1/6 systolic murmur left sternal border, no S3 or S4  EXT:  Without cyanosis, clubbing, or edema.  NEURO:  Cranial nerves II through XII intact.  Motor function and sensation intact.  SKIN: No rashes or suspicious lesions.  PSYCH:  Behavior is appropriate.      Assessment and Plan:     1. Hospital follow-up:   Hospitalization and results reviewed with patient. High risk conditions requiring teaching or care coordination were identified and addressed.The patient demonstrate understanding of admission and underlying conditions. The patient understands discharge instructions and when to seek medical attention. Medications reviewed including instructions regarding high risk medications, dosing and side effects.    The patient is able to safely adhere to ADL/IADL, treatment and medication regimen, self-manage of high-risk conditions? Yes  The patient requires physical therapy/home health/DME referral? Yes  The patient requires referral to care coordination/behavioral health/social work?  No   Patient requires referral for pharmacy consult? No   Advance directive/POLST on file?  No   Required counseling on advance directive?  Yes, handout given on free workshop dates.    - REFERRAL TO HOME HEALTH    2. Hypertension, uncontrolled  -Since her nephrologist thinks she is taking 30 mg a day of lisinopril and her PCP thinks she is taking 40 mg of lisinopril and she is actually taking 50 mg, she will adjust her dosage to take a total of 40 mg a day which she can divide 20 mg twice daily.  -Add low-dose amLODIPine (NORVASC) 2.5 MG Tab; Take 1 Tab by mouth every day.  Dispense: 30 Tab; Refill: 0  -She will continue to monitor her blood pressure.  - REFERRAL TO HOME HEALTH    3. Paroxysmal SVT (supraventricular tachycardia) (HCC)  -Cardiology consult scheduled for October  - REFERRAL TO HOME HEALTH    4. CKD (chronic  kidney disease) stage 3, GFR 30-59 ml/min  -She is continuing follow-up with Dr. Johnson  - REFERRAL TO HOME HEALTH    5. Heart murmur  -Last echocardiogram was done in November 2013.  It showed posterior mitral valve leaflet heavily calcified and thickened, mild mitral regurgitation, aortic sclerosis without stenosis, mild tricuspid regurgitation.  -Consider repeat.        Medication Reconciliation  Medication list at end of encounter:   Current Outpatient Prescriptions   Medication Sig Dispense Refill   • lisinopril (PRINIVIL) 20 MG Tab Take 1 Tab by mouth every morning. (Patient taking differently: Take 40 mg by mouth every morning.) 90 Tab 3   • tizanidine (ZANAFLEX) 2 MG tablet Take 2 mg by mouth 1 time daily as needed.     • brinzolamide (AZOPT) 1 % Suspension Place 1 Drop in both eyes 2 Times a Day.     • DULoxetine (CYMBALTA) 60 MG Cap DR Particles delayed-release capsule Take 1 Cap by mouth every day. 30 Cap 3   • lisinopril (PRINIVIL) 10 MG TABS Take 10 mg by mouth every bedtime.     • hydrocodone-acetaminophen (NORCO) 7.5-325 MG per tablet Take 1 Tab by mouth 3 times a day as needed for Severe Pain.     • Ferrous Sulfate (IRON) 325 (65 FE) MG TABS Take  by mouth every 48 hours.     • levothyroxine (SYNTHROID) 50 MCG TABS Take 50 mcg by mouth Every morning on an empty stomach.     • alprazolam (XANAX) 0.25 MG TABS Take 1 Tab by mouth at bedtime as needed for Sleep. [employer] fax # [phone] 30 Each 0   • GLUCOSAMINE PO Take 1,000 mg by mouth every day.     • latanoprost (XALATAN) 0.005 % SOLN Place 1 Drop in both eyes every evening.       No current facility-administered medications for this visit.        Primary care follow-up:  New health conditions identified during hospitalization? Yes  Changes to medications during hospitalization or today? Yes    Recommended followup:  with Maggi Olivarez M.D.   Future Appointments       Provider Department [place]    8/27/2018 3:20 PM Maggi Olivarez M.D. [facility] [address]    10/29/2018 11:15 AM Linda Camp M.D. [facility]             Patient Instruction  Patient provided with educational material on discharge diagnosis and management of symptoms/red flags. Patient instructed to keep follow-up appointments and to bring written questions and and actual medications to each office visit. Patient instructed to call PCP/specialist with any problems/questions/concerns. Patient verbalizes understanding and has no further questions at this time.    Face-to-face transitional care management services with high medical decision complexity were provided.      Addendum:    Face to Face Supporting Documentation - Home Health    The encounter with this patient was in whole or in part the primary reason for home health admission.    Date of encounter:   Patient:                    MRN:                       YOB: 2018  Anthony Lynn  [id number]  9/17/1931     Home health to see patient for:  Skilled Nursing care for assessment, interventions & education, Speech therapy    Skilled nursing interventions to include:  Skilled nursing to assess and teach the following but not limited to: Medications/high risk/fall prevention, hydration, nutrition, infection control, home safety.    Homebound status evidenced by:  Needs the assistance of another person in order to leave the home. Leaving home requires a considerable and taxing effort. There is a normal inability to leave the home.    Community Physician to provide follow up care: Maggi Olivarez M.D.     Optional Interventions? No      I certify the face to face encounter for this home health care referral meets the CMS requirements and the encounter/clinical assessment with the patient was, in whole, or in part, for the medical condition(s) listed above, which is the primary reason for home health care. Based on my clinical findings: the  service(s) are medically necessary, support the need for home health care, and the homebound criteria are met.  I certify that this patient has had a face to face encounter by myself.  Lala Drake M.D. - NPI: [id number]

## 2018-08-02 ENCOUNTER — HOME HEALTH ADMISSION (OUTPATIENT)
Dept: HOME HEALTH SERVICES | Facility: HOME HEALTHCARE | Age: 83
End: 2018-08-02
Payer: MEDICARE

## 2018-08-03 ENCOUNTER — HOME CARE VISIT (OUTPATIENT)
Dept: HOME HEALTH SERVICES | Facility: HOME HEALTHCARE | Age: 83
End: 2018-08-03
Payer: MEDICARE

## 2018-08-03 ENCOUNTER — TELEPHONE (OUTPATIENT)
Dept: HEALTH INFORMATION MANAGEMENT | Facility: OTHER | Age: 83
End: 2018-08-03

## 2018-08-03 VITALS
HEIGHT: 60 IN | RESPIRATION RATE: 18 BRPM | HEART RATE: 72 BPM | BODY MASS INDEX: 22.78 KG/M2 | SYSTOLIC BLOOD PRESSURE: 164 MMHG | DIASTOLIC BLOOD PRESSURE: 68 MMHG | OXYGEN SATURATION: 98 % | WEIGHT: 116 LBS | TEMPERATURE: 97.5 F

## 2018-08-03 PROCEDURE — 665001 SOC-HOME HEALTH

## 2018-08-03 PROCEDURE — G0493 RN CARE EA 15 MIN HH/HOSPICE: HCPCS

## 2018-08-03 SDOH — ECONOMIC STABILITY: HOUSING INSECURITY: UNSAFE APPLIANCES: 0

## 2018-08-03 SDOH — ECONOMIC STABILITY: HOUSING INSECURITY: UNSAFE COOKING RANGE AREA: 0

## 2018-08-03 ASSESSMENT — PATIENT HEALTH QUESTIONNAIRE - PHQ9
1. LITTLE INTEREST OR PLEASURE IN DOING THINGS: 00
2. FEELING DOWN, DEPRESSED, IRRITABLE, OR HOPELESS: 00

## 2018-08-03 ASSESSMENT — ENCOUNTER SYMPTOMS: POOR JUDGMENT: 1

## 2018-08-03 ASSESSMENT — ACTIVITIES OF DAILY LIVING (ADL)
HOME_HEALTH_OASIS: 00
OASIS_M1830: 00

## 2018-08-03 NOTE — TELEPHONE ENCOUNTER
Received referral from [facility]. Medications reviewed. No clinically significant interactions or medication issues noted.     Ruma Escobedo, WileyD

## 2018-08-05 ENCOUNTER — HOME CARE VISIT (OUTPATIENT)
Dept: HOME HEALTH SERVICES | Facility: HOME HEALTHCARE | Age: 83
End: 2018-08-05
Payer: MEDICARE

## 2018-08-05 VITALS
OXYGEN SATURATION: 97 % | RESPIRATION RATE: 18 BRPM | TEMPERATURE: 97.5 F | DIASTOLIC BLOOD PRESSURE: 74 MMHG | SYSTOLIC BLOOD PRESSURE: 150 MMHG | HEART RATE: 69 BPM

## 2018-08-05 PROCEDURE — G0493 RN CARE EA 15 MIN HH/HOSPICE: HCPCS

## 2018-08-06 ENCOUNTER — HOME CARE VISIT (OUTPATIENT)
Dept: HOME HEALTH SERVICES | Facility: HOME HEALTHCARE | Age: 83
End: 2018-08-06
Payer: MEDICARE

## 2018-08-06 VITALS
TEMPERATURE: 98.4 F | HEART RATE: 68 BPM | DIASTOLIC BLOOD PRESSURE: 70 MMHG | SYSTOLIC BLOOD PRESSURE: 170 MMHG | RESPIRATION RATE: 18 BRPM

## 2018-08-06 PROCEDURE — G0153 HHCP-SVS OF S/L PATH,EA 15MN: HCPCS

## 2018-08-06 ASSESSMENT — ENCOUNTER SYMPTOMS
DEBILITATING PAIN: 1
DIFFICULTY THINKING: 1

## 2018-08-06 ASSESSMENT — ACTIVITIES OF DAILY LIVING (ADL): HOME_HEALTH_OASIS: 01

## 2018-08-07 ENCOUNTER — HOME CARE VISIT (OUTPATIENT)
Dept: HOME HEALTH SERVICES | Facility: HOME HEALTHCARE | Age: 83
End: 2018-08-07
Payer: MEDICARE

## 2018-08-08 ENCOUNTER — HOME CARE VISIT (OUTPATIENT)
Dept: HOME HEALTH SERVICES | Facility: HOME HEALTHCARE | Age: 83
End: 2018-08-08
Payer: MEDICARE

## 2018-08-08 VITALS
RESPIRATION RATE: 18 BRPM | SYSTOLIC BLOOD PRESSURE: 137 MMHG | HEART RATE: 69 BPM | OXYGEN SATURATION: 97 % | TEMPERATURE: 98 F | DIASTOLIC BLOOD PRESSURE: 63 MMHG

## 2018-08-08 VITALS
HEART RATE: 69 BPM | TEMPERATURE: 98 F | DIASTOLIC BLOOD PRESSURE: 63 MMHG | RESPIRATION RATE: 18 BRPM | SYSTOLIC BLOOD PRESSURE: 137 MMHG

## 2018-08-08 PROCEDURE — G0299 HHS/HOSPICE OF RN EA 15 MIN: HCPCS

## 2018-08-08 PROCEDURE — G0153 HHCP-SVS OF S/L PATH,EA 15MN: HCPCS

## 2018-08-08 ASSESSMENT — ENCOUNTER SYMPTOMS
NAUSEA: DENIES
VOMITING: DENIES
DIFFICULTY THINKING: 1

## 2018-08-10 ENCOUNTER — HOME CARE VISIT (OUTPATIENT)
Dept: HOME HEALTH SERVICES | Facility: HOME HEALTHCARE | Age: 83
End: 2018-08-10
Payer: MEDICARE

## 2018-08-13 ENCOUNTER — HOME CARE VISIT (OUTPATIENT)
Dept: HOME HEALTH SERVICES | Facility: HOME HEALTHCARE | Age: 83
End: 2018-08-13
Payer: MEDICARE

## 2018-08-13 ENCOUNTER — PATIENT MESSAGE (OUTPATIENT)
Dept: MEDICAL GROUP | Facility: MEDICAL CENTER | Age: 83
End: 2018-08-13

## 2018-08-13 VITALS
DIASTOLIC BLOOD PRESSURE: 58 MMHG | SYSTOLIC BLOOD PRESSURE: 143 MMHG | TEMPERATURE: 98.8 F | HEART RATE: 73 BPM | RESPIRATION RATE: 16 BRPM

## 2018-08-13 DIAGNOSIS — I10 ESSENTIAL HYPERTENSION: ICD-10-CM

## 2018-08-13 PROCEDURE — G0153 HHCP-SVS OF S/L PATH,EA 15MN: HCPCS

## 2018-08-13 PROCEDURE — G0180 MD CERTIFICATION HHA PATIENT: HCPCS | Performed by: FAMILY MEDICINE

## 2018-08-13 RX ORDER — AMLODIPINE BESYLATE 10 MG/1
10 TABLET ORAL DAILY
Qty: 30 TAB | Refills: 3 | Status: SHIPPED | OUTPATIENT
Start: 2018-08-13 | End: 2018-11-05 | Stop reason: SDUPTHER

## 2018-08-14 ENCOUNTER — HOME CARE VISIT (OUTPATIENT)
Dept: HOME HEALTH SERVICES | Facility: HOME HEALTHCARE | Age: 83
End: 2018-08-14
Payer: MEDICARE

## 2018-08-14 VITALS
HEART RATE: 73 BPM | TEMPERATURE: 98.2 F | OXYGEN SATURATION: 97 % | DIASTOLIC BLOOD PRESSURE: 72 MMHG | RESPIRATION RATE: 16 BRPM | SYSTOLIC BLOOD PRESSURE: 150 MMHG

## 2018-08-14 PROCEDURE — G0493 RN CARE EA 15 MIN HH/HOSPICE: HCPCS

## 2018-08-14 SDOH — ECONOMIC STABILITY: HOUSING INSECURITY: UNSAFE COOKING RANGE AREA: 0

## 2018-08-14 SDOH — ECONOMIC STABILITY: HOUSING INSECURITY: UNSAFE APPLIANCES: 0

## 2018-08-14 ASSESSMENT — ENCOUNTER SYMPTOMS
RESPIRATORY SYMPTOMS COMMENTS: NO RESPIRATORY DISTRESS
VOMITING: PATIENT VERBALIZES NO EMESIS.
NAUSEA: PATIENT VERBALIZES NO NAUSEA.
DIFFICULTY THINKING: 1

## 2018-08-15 ENCOUNTER — HOME CARE VISIT (OUTPATIENT)
Dept: HOME HEALTH SERVICES | Facility: HOME HEALTHCARE | Age: 83
End: 2018-08-15
Payer: MEDICARE

## 2018-08-15 VITALS
RESPIRATION RATE: 18 BRPM | SYSTOLIC BLOOD PRESSURE: 142 MMHG | TEMPERATURE: 99.6 F | HEART RATE: 77 BPM | DIASTOLIC BLOOD PRESSURE: 62 MMHG

## 2018-08-15 PROCEDURE — G0153 HHCP-SVS OF S/L PATH,EA 15MN: HCPCS

## 2018-08-17 ENCOUNTER — HOME CARE VISIT (OUTPATIENT)
Dept: HOME HEALTH SERVICES | Facility: HOME HEALTHCARE | Age: 83
End: 2018-08-17
Payer: MEDICARE

## 2018-08-17 VITALS
HEART RATE: 73 BPM | DIASTOLIC BLOOD PRESSURE: 58 MMHG | SYSTOLIC BLOOD PRESSURE: 110 MMHG | RESPIRATION RATE: 16 BRPM | TEMPERATURE: 97.3 F | OXYGEN SATURATION: 96 %

## 2018-08-17 PROCEDURE — G0299 HHS/HOSPICE OF RN EA 15 MIN: HCPCS

## 2018-08-17 ASSESSMENT — ENCOUNTER SYMPTOMS
VOMITING: DENIES
NAUSEA: DENIES

## 2018-08-20 ENCOUNTER — HOME CARE VISIT (OUTPATIENT)
Dept: HOME HEALTH SERVICES | Facility: HOME HEALTHCARE | Age: 83
End: 2018-08-20
Payer: MEDICARE

## 2018-08-20 ENCOUNTER — HOSPITAL ENCOUNTER (OUTPATIENT)
Dept: LAB | Facility: MEDICAL CENTER | Age: 83
End: 2018-08-20
Attending: INTERNAL MEDICINE
Payer: MEDICARE

## 2018-08-20 VITALS
SYSTOLIC BLOOD PRESSURE: 152 MMHG | RESPIRATION RATE: 16 BRPM | DIASTOLIC BLOOD PRESSURE: 68 MMHG | HEART RATE: 69 BPM | TEMPERATURE: 97.3 F

## 2018-08-20 LAB
25(OH)D3 SERPL-MCNC: 52 NG/ML (ref 30–100)
ALBUMIN SERPL BCP-MCNC: 4.2 G/DL (ref 3.2–4.9)
BASOPHILS # BLD AUTO: 0.1 % (ref 0–1.8)
BASOPHILS # BLD: 0.01 K/UL (ref 0–0.12)
BUN SERPL-MCNC: 38 MG/DL (ref 8–22)
CALCIUM SERPL-MCNC: 9.5 MG/DL (ref 8.5–10.5)
CHLORIDE SERPL-SCNC: 104 MMOL/L (ref 96–112)
CO2 SERPL-SCNC: 27 MMOL/L (ref 20–33)
CREAT SERPL-MCNC: 1.58 MG/DL (ref 0.5–1.4)
EOSINOPHIL # BLD AUTO: 0.11 K/UL (ref 0–0.51)
EOSINOPHIL NFR BLD: 1.5 % (ref 0–6.9)
ERYTHROCYTE [DISTWIDTH] IN BLOOD BY AUTOMATED COUNT: 54.5 FL (ref 35.9–50)
FERRITIN SERPL-MCNC: 42.5 NG/ML (ref 10–291)
GLUCOSE SERPL-MCNC: 95 MG/DL (ref 65–99)
HCT VFR BLD AUTO: 34.8 % (ref 37–47)
HGB BLD-MCNC: 11.2 G/DL (ref 12–16)
IMM GRANULOCYTES # BLD AUTO: 0.02 K/UL (ref 0–0.11)
IMM GRANULOCYTES NFR BLD AUTO: 0.3 % (ref 0–0.9)
IRON SATN MFR SERPL: 19 % (ref 15–55)
IRON SERPL-MCNC: 68 UG/DL (ref 40–170)
LYMPHOCYTES # BLD AUTO: 2.73 K/UL (ref 1–4.8)
LYMPHOCYTES NFR BLD: 37.3 % (ref 22–41)
MCH RBC QN AUTO: 31.3 PG (ref 27–33)
MCHC RBC AUTO-ENTMCNC: 32.2 G/DL (ref 33.6–35)
MCV RBC AUTO: 97.2 FL (ref 81.4–97.8)
MONOCYTES # BLD AUTO: 0.63 K/UL (ref 0–0.85)
MONOCYTES NFR BLD AUTO: 8.6 % (ref 0–13.4)
NEUTROPHILS # BLD AUTO: 3.81 K/UL (ref 2–7.15)
NEUTROPHILS NFR BLD: 52.2 % (ref 44–72)
NRBC # BLD AUTO: 0 K/UL
NRBC BLD-RTO: 0 /100 WBC
PHOSPHATE SERPL-MCNC: 5.3 MG/DL (ref 2.5–4.5)
PLATELET # BLD AUTO: 267 K/UL (ref 164–446)
PMV BLD AUTO: 10.7 FL (ref 9–12.9)
POTASSIUM SERPL-SCNC: 5.3 MMOL/L (ref 3.6–5.5)
PTH-INTACT SERPL-MCNC: 74.3 PG/ML (ref 14–72)
RBC # BLD AUTO: 3.58 M/UL (ref 4.2–5.4)
SODIUM SERPL-SCNC: 139 MMOL/L (ref 135–145)
TIBC SERPL-MCNC: 357 UG/DL (ref 250–450)
WBC # BLD AUTO: 7.3 K/UL (ref 4.8–10.8)

## 2018-08-20 PROCEDURE — 80069 RENAL FUNCTION PANEL: CPT

## 2018-08-20 PROCEDURE — 83970 ASSAY OF PARATHORMONE: CPT

## 2018-08-20 PROCEDURE — 82306 VITAMIN D 25 HYDROXY: CPT

## 2018-08-20 PROCEDURE — 36415 COLL VENOUS BLD VENIPUNCTURE: CPT

## 2018-08-20 PROCEDURE — 85025 COMPLETE CBC W/AUTO DIFF WBC: CPT

## 2018-08-20 PROCEDURE — 83550 IRON BINDING TEST: CPT

## 2018-08-20 PROCEDURE — 83540 ASSAY OF IRON: CPT

## 2018-08-20 PROCEDURE — G0153 HHCP-SVS OF S/L PATH,EA 15MN: HCPCS

## 2018-08-20 PROCEDURE — 82728 ASSAY OF FERRITIN: CPT

## 2018-08-21 RX ORDER — LEVOTHYROXINE SODIUM 0.05 MG/1
50 TABLET ORAL
Qty: 30 TAB | Refills: 0 | Status: SHIPPED | OUTPATIENT
Start: 2018-08-21 | End: 2018-09-18 | Stop reason: SDUPTHER

## 2018-08-22 ENCOUNTER — HOME CARE VISIT (OUTPATIENT)
Dept: HOME HEALTH SERVICES | Facility: HOME HEALTHCARE | Age: 83
End: 2018-08-22
Payer: MEDICARE

## 2018-08-22 VITALS
SYSTOLIC BLOOD PRESSURE: 132 MMHG | HEART RATE: 79 BPM | DIASTOLIC BLOOD PRESSURE: 60 MMHG | TEMPERATURE: 98.3 F | RESPIRATION RATE: 17 BRPM

## 2018-08-22 VITALS
DIASTOLIC BLOOD PRESSURE: 60 MMHG | SYSTOLIC BLOOD PRESSURE: 132 MMHG | TEMPERATURE: 98.3 F | OXYGEN SATURATION: 99 % | RESPIRATION RATE: 17 BRPM | HEART RATE: 79 BPM

## 2018-08-22 PROCEDURE — G0299 HHS/HOSPICE OF RN EA 15 MIN: HCPCS

## 2018-08-22 PROCEDURE — G0153 HHCP-SVS OF S/L PATH,EA 15MN: HCPCS

## 2018-08-22 ASSESSMENT — ENCOUNTER SYMPTOMS
VOMITING: DENIES
NAUSEA: DENIES

## 2018-08-28 ENCOUNTER — HOME CARE VISIT (OUTPATIENT)
Dept: HOME HEALTH SERVICES | Facility: HOME HEALTHCARE | Age: 83
End: 2018-08-28
Payer: MEDICARE

## 2018-08-28 PROCEDURE — G0153 HHCP-SVS OF S/L PATH,EA 15MN: HCPCS

## 2018-08-29 ENCOUNTER — HOME CARE VISIT (OUTPATIENT)
Dept: HOME HEALTH SERVICES | Facility: HOME HEALTHCARE | Age: 83
End: 2018-08-29
Payer: MEDICARE

## 2018-08-29 PROCEDURE — G0155 HHCP-SVS OF CSW,EA 15 MIN: HCPCS

## 2018-08-30 ENCOUNTER — HOME CARE VISIT (OUTPATIENT)
Dept: HOME HEALTH SERVICES | Facility: HOME HEALTHCARE | Age: 83
End: 2018-08-30
Payer: MEDICARE

## 2018-08-30 VITALS
RESPIRATION RATE: 18 BRPM | TEMPERATURE: 98.7 F | SYSTOLIC BLOOD PRESSURE: 142 MMHG | DIASTOLIC BLOOD PRESSURE: 68 MMHG | HEART RATE: 77 BPM

## 2018-08-30 VITALS
HEART RATE: 67 BPM | OXYGEN SATURATION: 98 % | TEMPERATURE: 98.6 F | SYSTOLIC BLOOD PRESSURE: 136 MMHG | DIASTOLIC BLOOD PRESSURE: 67 MMHG | RESPIRATION RATE: 16 BRPM

## 2018-08-30 VITALS
RESPIRATION RATE: 16 BRPM | SYSTOLIC BLOOD PRESSURE: 136 MMHG | TEMPERATURE: 98.6 F | DIASTOLIC BLOOD PRESSURE: 67 MMHG | HEART RATE: 67 BPM

## 2018-08-30 PROCEDURE — G0153 HHCP-SVS OF S/L PATH,EA 15MN: HCPCS

## 2018-08-30 PROCEDURE — G0299 HHS/HOSPICE OF RN EA 15 MIN: HCPCS

## 2018-08-30 ASSESSMENT — ENCOUNTER SYMPTOMS
VOMITING: DENIES
NAUSEA: DENIES

## 2018-09-06 ENCOUNTER — HOME CARE VISIT (OUTPATIENT)
Dept: HOME HEALTH SERVICES | Facility: HOME HEALTHCARE | Age: 83
End: 2018-09-06
Payer: MEDICARE

## 2018-09-06 PROCEDURE — G0155 HHCP-SVS OF CSW,EA 15 MIN: HCPCS

## 2018-09-06 PROCEDURE — G0493 RN CARE EA 15 MIN HH/HOSPICE: HCPCS

## 2018-09-07 VITALS
OXYGEN SATURATION: 99 % | TEMPERATURE: 99 F | DIASTOLIC BLOOD PRESSURE: 60 MMHG | RESPIRATION RATE: 16 BRPM | HEART RATE: 62 BPM | SYSTOLIC BLOOD PRESSURE: 115 MMHG

## 2018-09-07 SDOH — ECONOMIC STABILITY: HOUSING INSECURITY: HOME SAFETY: HUSBAND USES CONTINUOUS O2, EDUCATED ON O2 SAFETY.

## 2018-09-07 SDOH — ECONOMIC STABILITY: HOUSING INSECURITY: UNSAFE APPLIANCES: 0

## 2018-09-07 SDOH — ECONOMIC STABILITY: HOUSING INSECURITY: UNSAFE COOKING RANGE AREA: 0

## 2018-09-07 ASSESSMENT — ACTIVITIES OF DAILY LIVING (ADL)
OASIS_M1830: 00
HOME_HEALTH_OASIS: 00

## 2018-09-18 ENCOUNTER — HOSPITAL ENCOUNTER (OUTPATIENT)
Facility: MEDICAL CENTER | Age: 83
End: 2018-09-18
Attending: FAMILY MEDICINE
Payer: MEDICARE

## 2018-09-18 ENCOUNTER — HOME CARE VISIT (OUTPATIENT)
Dept: HOME HEALTH SERVICES | Facility: HOME HEALTHCARE | Age: 83
End: 2018-09-18
Payer: MEDICARE

## 2018-09-18 ENCOUNTER — OFFICE VISIT (OUTPATIENT)
Dept: MEDICAL GROUP | Facility: MEDICAL CENTER | Age: 83
End: 2018-09-18
Payer: MEDICARE

## 2018-09-18 VITALS
BODY MASS INDEX: 23.36 KG/M2 | HEIGHT: 60 IN | WEIGHT: 119 LBS | OXYGEN SATURATION: 95 % | SYSTOLIC BLOOD PRESSURE: 110 MMHG | DIASTOLIC BLOOD PRESSURE: 42 MMHG | RESPIRATION RATE: 16 BRPM | TEMPERATURE: 97.2 F | HEART RATE: 71 BPM

## 2018-09-18 DIAGNOSIS — I10 ESSENTIAL HYPERTENSION: ICD-10-CM

## 2018-09-18 DIAGNOSIS — F32.4 MAJOR DEPRESSIVE DISORDER WITH SINGLE EPISODE, IN PARTIAL REMISSION (HCC): ICD-10-CM

## 2018-09-18 DIAGNOSIS — E03.4 HYPOTHYROIDISM DUE TO ACQUIRED ATROPHY OF THYROID: ICD-10-CM

## 2018-09-18 DIAGNOSIS — D64.9 ANEMIA, UNSPECIFIED TYPE: ICD-10-CM

## 2018-09-18 DIAGNOSIS — N18.30 CKD (CHRONIC KIDNEY DISEASE) STAGE 3, GFR 30-59 ML/MIN (HCC): ICD-10-CM

## 2018-09-18 PROCEDURE — 82274 ASSAY TEST FOR BLOOD FECAL: CPT

## 2018-09-18 PROCEDURE — 99214 OFFICE O/P EST MOD 30 MIN: CPT | Performed by: FAMILY MEDICINE

## 2018-09-18 RX ORDER — LEVOTHYROXINE SODIUM 0.05 MG/1
50 TABLET ORAL
Qty: 30 TAB | Refills: 0 | Status: SHIPPED | OUTPATIENT
Start: 2018-09-18 | End: 2018-10-25 | Stop reason: SDUPTHER

## 2018-09-18 RX ORDER — LISINOPRIL 20 MG/1
20 TABLET ORAL 2 TIMES DAILY
Qty: 180 TAB | Refills: 1 | Status: SHIPPED | OUTPATIENT
Start: 2018-09-18 | End: 2019-05-09 | Stop reason: SDUPTHER

## 2018-09-18 NOTE — PROGRESS NOTES
CC: HTN, Depression, low thyroid, CKD, anemia    HPI:   Anthony presents today to discuss the following :    Essential hypertension  BP has been adequately controlled on current medication. Denies headache, chest pain, and SOB.Has been on lisinopril 10 mg daily     Major depressive disorder with single episode, in partial remission (HCC)  Patient has been asymptomatic. Her mood has improved a lot with the on Cymbalta,denies any suicidal ideation.     Hypothyroidism due to acquired atrophy of thyroid  She has been tolerating Levothyroxine, no palpitation, no cold or heat intolerance, currently on Levothyroxine 50 mcg daily.     CKD (chronic kidney disease) stage 3, GFR 30-59 ml/min  Her last eGFR was 31, Cr 1.58. Patient has been asymptomatic.She follows up with nephrology Dr Johnson    Anemia, unspecified type  Patient has been having a chronic low Hb level. However she has been asymptomatic. Denies abdominal pain, nausea, and vomiting, denies any blood in the stool. Howecver she has CKD.Last colonoscopy was many years ago,          Patient Active Problem List    Diagnosis Date Noted   • Tachyarrhythmia 04/28/2011     Priority: High   • Sinus bradycardia 07/26/2018   • SUMA (acute kidney injury) (HCC) 07/25/2018   • Hypothyroidism 07/25/2018   • Normocytic anemia 07/25/2018   • Other hammer toe (acquired) 08/06/2015   • LBBB (left bundle branch block) 11/07/2013   • CKD (chronic kidney disease) stage 3, GFR 30-59 ml/min 11/07/2013   • Fatigue 11/07/2013   • Glaucoma 11/30/2012   • Toe dislocation 05/17/2012   • Major depression 10/27/2011   • HTN (hypertension) 04/28/2011   • GERD (gastroesophageal reflux disease) 04/28/2011   • Renal insufficiency 04/12/2011   • CATARACT    • Fibromyalgia    • IBS (irritable bowel syndrome)    • Dyslipidemia    • Hives        Current Outpatient Prescriptions   Medication Sig Dispense Refill   • levothyroxine (SYNTHROID) 50 MCG Tab Take 1 Tab by mouth Every morning on an empty stomach. 30  Tab 0   • amLODIPine (NORVASC) 10 MG Tab Take 1 Tab by mouth every day. 30 Tab 3   • lisinopril (PRINIVIL) 10 MG Tab Take 20 mg by mouth every 12 hours.     • Diclofenac Sodium 1 % Gel Apply 1 Dose to affected area(s) 4 times a day as needed (pain).     • hydrocodone-acetaminophen (NORCO) 7.5-325 MG per tablet Take 1 Tab by mouth 3 times a day as needed for Severe Pain.     • latanoprost (XALATAN) 0.005 % SOLN Place 1 Drop in both eyes every evening.     • lisinopril (PRINIVIL) 20 MG Tab Take 1 Tab by mouth 2 times a day. 180 Tab 1   • cholecalciferol (D3 MAXIMUM STRENGTH) 5000 UNIT Cap Take 5,000 Units by mouth every day.     • Misc Natural Products (ENERGY FOCUS) Tab Take 1 Tab by mouth every day.     • Multiple Vitamins-Minerals (MULTIVITAMIN WOMEN PO) Take 1 tablet by mouth every day.     • Phenylephrine-Guaifenesin (MUCUS RELIEF D)  MG Tab Take 1 tablet by mouth 1 time daily as needed (allergies).     • Caffeine (STAY AWAKE PO) Take 1 Tab by mouth 1 time daily as needed (to stay awake).     • sucralfate (CARAFATE) 1 GM Tab Take 1 g by mouth as needed (stomach ache).     • tizanidine (ZANAFLEX) 2 MG tablet Take 2 mg by mouth 1 time daily as needed.     • brinzolamide (AZOPT) 1 % Suspension Place 1 Drop in both eyes 2 Times a Day.     • DULoxetine (CYMBALTA) 60 MG Cap DR Particles delayed-release capsule Take 1 Cap by mouth every day. 30 Cap 3   • Ferrous Sulfate (IRON) 325 (65 FE) MG TABS Take 325 mg by mouth every 48 hours.     • alprazolam (XANAX) 0.25 MG TABS Take 1 Tab by mouth at bedtime as needed for Sleep. [facility] fax # [phone] 30 Each 0   • GLUCOSAMINE PO Take 1,000 mg by mouth every day.       No current facility-administered medications for this visit.          Allergies as of 09/18/2018 - Reviewed 09/18/2018   Allergen Reaction Noted   • Nkda [no known drug allergy]  01/11/2011        ROS: Denies any chest pain, Shortness of breath, Changes bowel or bladder, Lower extremity edema.    Physical  Exam:  /42   Pulse 71   Temp 36.2 °C (97.2 °F)   Resp 16   Ht 1.524 m (5')   Wt 54 kg (119 lb)   SpO2 95%   BMI 23.24 kg/m²   Gen.: Well-developed, well-nourished, no apparent distress,pleasant and cooperative with the examination  Skin:  Warm and dry with good turgor. No rashes or suspicious lesions in visible areas  HEENT:Sinuses nontender with palpation, TMs clear, nares patent with pink mucosa and clear rhinorrhea,no septal deviation ,polyps or lesions. lips without lesions, oropharynx clear.  Neck: Trachea midline,no masses or adenopathy. No JVD.  Cor: Regular rate and rhythm without murmur, gallop or rub.  Lungs: Respirations unlabored.Clear to auscultation with equal breath sounds bilaterally. No wheezes, rhonchi.  Extremities: No cyanosis, clubbing or edema.  Abd: Soft, NT, ND, BS+.    Assessment and Plan.   87 y.o. female     1. Hypothyroidism due to acquired atrophy of thyroid  He has been tolerating Levothyroxine, no palpitation, no cold or heat intolerance  Continue on Levothyroxine 50 mcg daily.    2. Essential hypertension  Has been adequately controlled on current medication. Denies headache, chest pain, and SOB.  Continue on lisinopril 10 mg daily    3. Major depressive disorder with single episode, in partial remission (HCC)  Has been doing fine on Cymbalta, which shs been helping her fibromyalgia as well.    4. CKD (chronic kidney disease) stage 3, GFR 30-59 ml/min  Last eGFR was 31, Cr 1.58. Asymptomatic.  Continue follow up with nephrology Dr Johnson    5. Anemia, unspecified type  CBC showed a chronic low Hb level. Patient has been asymptomatic. Denies any blood in the stool. Could be CKD induced.  However will do the FIT test, to r/o chronic GIB.    - OCCULT BLOOD FECES IMMUNOASSAY; Future

## 2018-09-29 DIAGNOSIS — D64.9 ANEMIA, UNSPECIFIED TYPE: ICD-10-CM

## 2018-09-29 LAB — AMBIGUOUS DTTM AMBI4: NORMAL

## 2018-10-01 LAB — HEMOCCULT STL QL IA: NEGATIVE

## 2018-10-25 RX ORDER — LEVOTHYROXINE SODIUM 0.05 MG/1
TABLET ORAL
Qty: 30 TAB | Refills: 0 | Status: SHIPPED | OUTPATIENT
Start: 2018-10-25 | End: 2018-10-29 | Stop reason: SDUPTHER

## 2018-10-29 RX ORDER — SUCRALFATE 1 G/1
1 TABLET ORAL PRN
Qty: 120 TAB | Refills: 3 | Status: SHIPPED | OUTPATIENT
Start: 2018-10-29 | End: 2018-10-30 | Stop reason: SDUPTHER

## 2018-10-29 RX ORDER — LEVOTHYROXINE SODIUM 0.05 MG/1
50 TABLET ORAL
Qty: 90 TAB | Refills: 3 | Status: SHIPPED | OUTPATIENT
Start: 2018-10-29 | End: 2020-01-24

## 2018-10-29 RX ORDER — DULOXETIN HYDROCHLORIDE 60 MG/1
60 CAPSULE, DELAYED RELEASE ORAL DAILY
Qty: 30 CAP | Refills: 3 | Status: SHIPPED | OUTPATIENT
Start: 2018-10-29 | End: 2018-11-12 | Stop reason: SDUPTHER

## 2018-10-29 NOTE — TELEPHONE ENCOUNTER
Pt would like a 90 day supply.    Was the patient seen in the last year in this department? Yes    Does patient have an active prescription for medications requested? No     Received Request Via: Patient

## 2018-10-30 ENCOUNTER — TELEPHONE (OUTPATIENT)
Dept: MEDICAL GROUP | Facility: MEDICAL CENTER | Age: 83
End: 2018-10-30

## 2018-10-30 DIAGNOSIS — K29.50 CHRONIC GASTRITIS WITHOUT BLEEDING, UNSPECIFIED GASTRITIS TYPE: ICD-10-CM

## 2018-10-30 RX ORDER — SUCRALFATE 1 G/1
1 TABLET ORAL DAILY
Qty: 120 TAB | Refills: 3 | Status: SHIPPED | OUTPATIENT
Start: 2018-10-30 | End: 2019-12-30 | Stop reason: SDUPTHER

## 2018-10-30 NOTE — TELEPHONE ENCOUNTER
1. Caller Name: Pharmacy                                         Call Back Number:       Patient approves a detailed voicemail message: N\A    Pharmacy needs a clarify on Sig. They need a daily supply for Sucralfate. As needed is not acceptable by the Pharmacy.

## 2018-11-05 ENCOUNTER — OFFICE VISIT (OUTPATIENT)
Dept: URGENT CARE | Facility: PHYSICIAN GROUP | Age: 83
End: 2018-11-05
Payer: MEDICARE

## 2018-11-05 ENCOUNTER — HOSPITAL ENCOUNTER (OUTPATIENT)
Dept: RADIOLOGY | Facility: MEDICAL CENTER | Age: 83
End: 2018-11-05
Attending: FAMILY MEDICINE
Payer: MEDICARE

## 2018-11-05 ENCOUNTER — HOSPITAL ENCOUNTER (OUTPATIENT)
Dept: LAB | Facility: MEDICAL CENTER | Age: 83
End: 2018-11-05
Attending: INTERNAL MEDICINE
Payer: MEDICARE

## 2018-11-05 VITALS
HEIGHT: 60 IN | RESPIRATION RATE: 14 BRPM | BODY MASS INDEX: 22.58 KG/M2 | HEART RATE: 96 BPM | OXYGEN SATURATION: 96 % | SYSTOLIC BLOOD PRESSURE: 164 MMHG | DIASTOLIC BLOOD PRESSURE: 72 MMHG | WEIGHT: 115 LBS | TEMPERATURE: 97.2 F

## 2018-11-05 DIAGNOSIS — I10 ESSENTIAL HYPERTENSION: ICD-10-CM

## 2018-11-05 DIAGNOSIS — R10.32 LEFT LOWER QUADRANT PAIN: ICD-10-CM

## 2018-11-05 LAB
ALBUMIN SERPL BCP-MCNC: 4.4 G/DL (ref 3.2–4.9)
BASOPHILS # BLD AUTO: 0.3 % (ref 0–1.8)
BASOPHILS # BLD: 0.02 K/UL (ref 0–0.12)
BUN SERPL-MCNC: 29 MG/DL (ref 8–22)
CALCIUM SERPL-MCNC: 10.3 MG/DL (ref 8.5–10.5)
CHLORIDE SERPL-SCNC: 105 MMOL/L (ref 96–112)
CO2 SERPL-SCNC: 25 MMOL/L (ref 20–33)
CREAT SERPL-MCNC: 1.22 MG/DL (ref 0.5–1.4)
CREAT UR-MCNC: 92.4 MG/DL
EOSINOPHIL # BLD AUTO: 0.11 K/UL (ref 0–0.51)
EOSINOPHIL NFR BLD: 1.5 % (ref 0–6.9)
ERYTHROCYTE [DISTWIDTH] IN BLOOD BY AUTOMATED COUNT: 51.5 FL (ref 35.9–50)
GLUCOSE SERPL-MCNC: 99 MG/DL (ref 65–99)
HCT VFR BLD AUTO: 37.1 % (ref 37–47)
HGB BLD-MCNC: 11.9 G/DL (ref 12–16)
IMM GRANULOCYTES # BLD AUTO: 0.01 K/UL (ref 0–0.11)
IMM GRANULOCYTES NFR BLD AUTO: 0.1 % (ref 0–0.9)
LYMPHOCYTES # BLD AUTO: 3.28 K/UL (ref 1–4.8)
LYMPHOCYTES NFR BLD: 45.3 % (ref 22–41)
MCH RBC QN AUTO: 31 PG (ref 27–33)
MCHC RBC AUTO-ENTMCNC: 32.1 G/DL (ref 33.6–35)
MCV RBC AUTO: 96.6 FL (ref 81.4–97.8)
MONOCYTES # BLD AUTO: 0.7 K/UL (ref 0–0.85)
MONOCYTES NFR BLD AUTO: 9.7 % (ref 0–13.4)
NEUTROPHILS # BLD AUTO: 3.12 K/UL (ref 2–7.15)
NEUTROPHILS NFR BLD: 43.1 % (ref 44–72)
NRBC # BLD AUTO: 0 K/UL
NRBC BLD-RTO: 0 /100 WBC
PHOSPHATE SERPL-MCNC: 4.4 MG/DL (ref 2.5–4.5)
PLATELET # BLD AUTO: 328 K/UL (ref 164–446)
PMV BLD AUTO: 10 FL (ref 9–12.9)
POTASSIUM SERPL-SCNC: 5.1 MMOL/L (ref 3.6–5.5)
PROT UR-MCNC: 7.7 MG/DL (ref 0–15)
PROT/CREAT UR: 83 MG/G (ref 10–107)
RBC # BLD AUTO: 3.84 M/UL (ref 4.2–5.4)
SODIUM SERPL-SCNC: 137 MMOL/L (ref 135–145)
WBC # BLD AUTO: 7.2 K/UL (ref 4.8–10.8)

## 2018-11-05 PROCEDURE — 84156 ASSAY OF PROTEIN URINE: CPT

## 2018-11-05 PROCEDURE — 74018 RADEX ABDOMEN 1 VIEW: CPT

## 2018-11-05 PROCEDURE — 85025 COMPLETE CBC W/AUTO DIFF WBC: CPT

## 2018-11-05 PROCEDURE — 82570 ASSAY OF URINE CREATININE: CPT

## 2018-11-05 PROCEDURE — 99214 OFFICE O/P EST MOD 30 MIN: CPT | Performed by: FAMILY MEDICINE

## 2018-11-05 PROCEDURE — 36415 COLL VENOUS BLD VENIPUNCTURE: CPT

## 2018-11-05 PROCEDURE — 80069 RENAL FUNCTION PANEL: CPT

## 2018-11-05 RX ORDER — AMLODIPINE BESYLATE 10 MG/1
10 TABLET ORAL DAILY
Qty: 90 TAB | Refills: 3 | Status: SHIPPED | OUTPATIENT
Start: 2018-11-05 | End: 2020-02-10 | Stop reason: SDUPTHER

## 2018-11-08 ASSESSMENT — ENCOUNTER SYMPTOMS
NAUSEA: 0
DIARRHEA: 0
ABDOMINAL PAIN: 1
FLATUS: 1
VOMITING: 0
FEVER: 0

## 2018-11-08 NOTE — PROGRESS NOTES
Subjective:   Anthony Lynn is a 87 y.o. female who presents for Abdominal Pain (back [id number] )        Abdominal Pain   This is a new problem. The current episode started yesterday. The onset quality is undetermined. The problem occurs intermittently. The problem has been waxing and waning. The pain is located in the generalized abdominal region. The pain is moderate. The quality of the pain is colicky, cramping and aching. The abdominal pain does not radiate. Associated symptoms include flatus. Pertinent negatives include no diarrhea, fever, nausea or vomiting.     Review of Systems   Constitutional: Negative for fever.   Gastrointestinal: Positive for abdominal pain and flatus. Negative for diarrhea, nausea and vomiting.     Allergies   Allergen Reactions   • Nkda [No Known Drug Allergy]       Objective:   BP (!) 164/72   Pulse 96   Temp 36.2 °C (97.2 °F) (Temporal)   Resp 14   Ht 1.524 m (5')   Wt 52.2 kg (115 lb)   SpO2 96%   BMI 22.46 kg/m²   Physical Exam   Constitutional: She is oriented to person, place, and time. She appears well-developed and well-nourished. No distress.   HENT:   Head: Normocephalic and atraumatic.   Eyes: Pupils are equal, round, and reactive to light. Conjunctivae and EOM are normal.   Cardiovascular: Normal rate and regular rhythm.    No murmur heard.  Pulmonary/Chest: Effort normal and breath sounds normal. No respiratory distress.   Abdominal: Soft. She exhibits no distension. There is tenderness. There is no rebound and no guarding.   Neurological: She is alert and oriented to person, place, and time. She has normal reflexes. No sensory deficit.   Skin: Skin is warm and dry.   Psychiatric: She has a normal mood and affect.         Assessment/Plan:   1. Left lower quadrant pain  - [id number] VIEW; Future  Moderate stool burden on my read of KUB. OTC Colace per 's directions.   Differential diagnosis, natural history, supportive care, and indications for  immediate follow-up discussed.

## 2018-11-12 RX ORDER — DULOXETIN HYDROCHLORIDE 30 MG/1
30 CAPSULE, DELAYED RELEASE ORAL DAILY
Qty: 30 CAP | Refills: 3 | Status: SHIPPED | OUTPATIENT
Start: 2018-11-12 | End: 2019-01-17 | Stop reason: SDUPTHER

## 2018-12-19 ENCOUNTER — OFFICE VISIT (OUTPATIENT)
Dept: MEDICAL GROUP | Facility: MEDICAL CENTER | Age: 83
End: 2018-12-19
Payer: MEDICARE

## 2018-12-19 VITALS
TEMPERATURE: 96.8 F | RESPIRATION RATE: 14 BRPM | BODY MASS INDEX: 23.56 KG/M2 | OXYGEN SATURATION: 95 % | SYSTOLIC BLOOD PRESSURE: 136 MMHG | HEIGHT: 60 IN | HEART RATE: 68 BPM | WEIGHT: 120 LBS | DIASTOLIC BLOOD PRESSURE: 60 MMHG

## 2018-12-19 DIAGNOSIS — I10 ESSENTIAL HYPERTENSION: ICD-10-CM

## 2018-12-19 DIAGNOSIS — E03.9 ACQUIRED HYPOTHYROIDISM: ICD-10-CM

## 2018-12-19 DIAGNOSIS — N18.30 CKD (CHRONIC KIDNEY DISEASE) STAGE 3, GFR 30-59 ML/MIN (HCC): ICD-10-CM

## 2018-12-19 DIAGNOSIS — F33.41 RECURRENT MAJOR DEPRESSIVE DISORDER, IN PARTIAL REMISSION (HCC): ICD-10-CM

## 2018-12-19 PROCEDURE — 99214 OFFICE O/P EST MOD 30 MIN: CPT | Performed by: FAMILY MEDICINE

## 2018-12-19 RX ORDER — PREGABALIN 25 MG/1
25 CAPSULE ORAL 3 TIMES DAILY
COMMUNITY
End: 2021-07-30

## 2018-12-19 NOTE — PROGRESS NOTES
CC: Hypertension, CKD, hypothyroidism, depression.    HPI:   Anthony presents today to discuss the following:    Essential hypertension  BP has been adequately controlled on current medication. Denies headache, chest pain, and SOB.Has been on lisinopril 10 mg daily     CKD (chronic kidney disease) stage 3, GFR 30-59 ml/min  eGFR has improved, is currently 43, creatinine is 1.2.  It was 31, Cr 1.58. Patient has been asymptomatic.She follows up with nephrology Dr Johnson    Hypothyroidism due to acquired atrophy of thyroid  She has been tolerating Levothyroxine, no palpitation, no cold or heat intolerance, currently on Levothyroxine 50 mcg daily.     Major depressive disorder with single episode, in partial remission (HCC)  Patient has been asymptomatic. Her mood has improved a lot with the on Cymbalta,denies any suicidal ideation.       Patient Active Problem List    Diagnosis Date Noted   • Tachyarrhythmia 04/28/2011     Priority: High   • Sinus bradycardia 07/26/2018   • SUMA (acute kidney injury) ([facility]) 07/25/2018   • Hypothyroidism 07/25/2018   • Normocytic anemia 07/25/2018   • Other hammer toe (acquired) 08/06/2015   • LBBB (left bundle branch block) 11/07/2013   • CKD (chronic kidney disease) stage 3, GFR 30-59 ml/min ([facility]) 11/07/2013   • Fatigue 11/07/2013   • Glaucoma 11/30/2012   • Toe dislocation 05/17/2012   • Major depression 10/27/2011   • HTN (hypertension) 04/28/2011   • GERD (gastroesophageal reflux disease) 04/28/2011   • Renal insufficiency 04/12/2011   • CATARACT    • Fibromyalgia    • IBS (irritable bowel syndrome)    • Dyslipidemia    • Hives        Current Outpatient Prescriptions   Medication Sig Dispense Refill   • pregabalin (LYRICA) 50 MG capsule Take 50 mg by mouth 3 times a day.     • DULoxetine (CYMBALTA) 30 MG Cap DR Particles Take 1 Cap by mouth every day. 30 Cap 3   • amLODIPine (NORVASC) 10 MG Tab Take 1 Tab by mouth every day. 90 Tab 3   • sucralfate (CARAFATE) 1 GM Tab Take 1 Tab by mouth  every day. 120 Tab 3   • levothyroxine (SYNTHROID) 50 MCG Tab Take 1 Tab by mouth Every morning on an empty stomach. ON EMPTY STOMACH 90 Tab 3   • lisinopril (PRINIVIL) 20 MG Tab Take 1 Tab by mouth 2 times a day. 180 Tab 1   • lisinopril (PRINIVIL) 10 MG Tab Take 20 mg by mouth every 12 hours.     • cholecalciferol (D3 MAXIMUM STRENGTH) 5000 UNIT Cap Take 5,000 Units by mouth every day.     • Misc Natural Products (ENERGY FOCUS) Tab Take 1 Tab by mouth every day.     • Multiple Vitamins-Minerals (MULTIVITAMIN WOMEN PO) Take 1 tablet by mouth every day.     • Phenylephrine-Guaifenesin (MUCUS RELIEF D)  MG Tab Take 1 tablet by mouth 1 time daily as needed (allergies).     • Caffeine (STAY AWAKE PO) Take 1 Tab by mouth 1 time daily as needed (to stay awake).     • Diclofenac Sodium 1 % Gel Apply 1 Dose to affected area(s) 4 times a day as needed (pain).     • tizanidine (ZANAFLEX) 2 MG tablet Take 2 mg by mouth 1 time daily as needed.     • brinzolamide (AZOPT) 1 % Suspension Place 1 Drop in both eyes 2 Times a Day.     • hydrocodone-acetaminophen (NORCO) 7.5-325 MG per tablet Take 1 Tab by mouth 3 times a day as needed for Severe Pain.     • Ferrous Sulfate (IRON) 325 (65 FE) MG TABS Take 325 mg by mouth every 48 hours.     • alprazolam (XANAX) 0.25 MG TABS Take 1 Tab by mouth at bedtime as needed for Sleep. [facility] fax # [phone] (Patient not taking: Reported on 11/5/2018) 30 Each 0   • GLUCOSAMINE PO Take 1,000 mg by mouth every day.     • latanoprost (XALATAN) 0.005 % SOLN Place 1 Drop in both eyes every evening.       No current facility-administered medications for this visit.          Allergies as of 12/19/2018 - Reviewed 12/19/2018   Allergen Reaction Noted   • Nkda [no known drug allergy]  01/11/2011        ROS: Denies any chest pain, Shortness of breath, Changes bowel or bladder, Lower extremity edema.    Physical Exam:  /60 (BP Location: Left arm, Patient Position: Sitting, BP Cuff Size: Adult)    Pulse 68   Temp 36 °C (96.8 °F) (Temporal)   Resp 14   Ht 1.524 m (5')   Wt 54.4 kg (120 lb)   SpO2 95%   BMI 23.44 kg/m²   Gen.: Well-developed, well-nourished, no apparent distress,pleasant and cooperative with the examination  Skin:  Warm and dry with good turgor. No rashes or suspicious lesions in visible areas  HEENT:Sinuses nontender with palpation, TMs clear, nares patent with pink mucosa and clear rhinorrhea,no septal deviation ,polyps or lesions. lips without lesions, oropharynx clear.  Neck: Trachea midline,no masses or adenopathy. No JVD.  Cor: Regular rate and rhythm without murmur, gallop or rub.  Lungs: Respirations unlabored.Clear to auscultation with equal breath sounds bilaterally. No wheezes, rhonchi.  Extremities: No cyanosis, clubbing or edema.      Assessment and Plan.   87 y.o. female     1. Essential hypertension  Adequately controlled.  Continue on lisinopril.    2. CKD (chronic kidney disease) stage 3, GFR 30-59 ml/min ([facility])  Kidney functions has improved.  GFR is 43 creatinine 1.2.  Asymptomatic.  Recommend continue hydration and avoiding nephrotoxic medication.    3. Acquired hypothyroidism  He has been tolerating Levothyroxine, no palpitation, no cold or heat intolerance  Continue levothyroxine 50 mcg daily.    4. Recurrent major depressive disorder, in partial remission ([facility])  Her mood has been fluctuating.  However denies any suicidal ideation.  Continue on Cymbalta 30 mg daily.

## 2019-01-17 RX ORDER — DULOXETIN HYDROCHLORIDE 60 MG/1
60 CAPSULE, DELAYED RELEASE ORAL DAILY
Qty: 90 CAP | Refills: 1 | Status: SHIPPED | OUTPATIENT
Start: 2019-01-17 | End: 2019-08-27 | Stop reason: SDUPTHER

## 2019-01-17 RX ORDER — DULOXETIN HYDROCHLORIDE 30 MG/1
30 CAPSULE, DELAYED RELEASE ORAL DAILY
Qty: 90 CAP | Refills: 1 | Status: SHIPPED | OUTPATIENT
Start: 2019-01-17 | End: 2019-08-27 | Stop reason: SDUPTHER

## 2019-03-18 ENCOUNTER — HOSPITAL ENCOUNTER (OUTPATIENT)
Dept: LAB | Facility: MEDICAL CENTER | Age: 84
End: 2019-03-18
Attending: INTERNAL MEDICINE
Payer: MEDICARE

## 2019-03-18 LAB
ALBUMIN SERPL BCP-MCNC: 4.2 G/DL (ref 3.2–4.9)
BUN SERPL-MCNC: 26 MG/DL (ref 8–22)
CALCIUM SERPL-MCNC: 9.4 MG/DL (ref 8.5–10.5)
CHLORIDE SERPL-SCNC: 104 MMOL/L (ref 96–112)
CO2 SERPL-SCNC: 25 MMOL/L (ref 20–33)
CREAT SERPL-MCNC: 1.32 MG/DL (ref 0.5–1.4)
GLUCOSE SERPL-MCNC: 104 MG/DL (ref 65–99)
PHOSPHATE SERPL-MCNC: 3.7 MG/DL (ref 2.5–4.5)
POTASSIUM SERPL-SCNC: 3.8 MMOL/L (ref 3.6–5.5)
SODIUM SERPL-SCNC: 138 MMOL/L (ref 135–145)
URATE SERPL-MCNC: 5.5 MG/DL (ref 1.9–8.2)

## 2019-03-18 PROCEDURE — 84550 ASSAY OF BLOOD/URIC ACID: CPT

## 2019-03-18 PROCEDURE — 36415 COLL VENOUS BLD VENIPUNCTURE: CPT

## 2019-03-18 PROCEDURE — 80069 RENAL FUNCTION PANEL: CPT

## 2019-04-08 ENCOUNTER — PATIENT MESSAGE (OUTPATIENT)
Dept: MEDICAL GROUP | Facility: MEDICAL CENTER | Age: 84
End: 2019-04-08

## 2019-04-08 DIAGNOSIS — R52 PAIN: ICD-10-CM

## 2019-05-09 RX ORDER — LISINOPRIL 20 MG/1
TABLET ORAL
Qty: 180 TAB | Refills: 1 | Status: SHIPPED | OUTPATIENT
Start: 2019-05-09 | End: 2019-05-10 | Stop reason: SDUPTHER

## 2019-05-12 RX ORDER — LISINOPRIL 20 MG/1
20 TABLET ORAL 2 TIMES DAILY
Qty: 200 TAB | Refills: 3 | Status: SHIPPED | OUTPATIENT
Start: 2019-05-12 | End: 2020-06-08 | Stop reason: SDUPTHER

## 2019-06-19 ENCOUNTER — OFFICE VISIT (OUTPATIENT)
Dept: MEDICAL GROUP | Facility: MEDICAL CENTER | Age: 84
End: 2019-06-19
Payer: MEDICARE

## 2019-06-19 VITALS
DIASTOLIC BLOOD PRESSURE: 48 MMHG | WEIGHT: 120.37 LBS | SYSTOLIC BLOOD PRESSURE: 100 MMHG | HEIGHT: 59 IN | TEMPERATURE: 98.1 F | BODY MASS INDEX: 24.27 KG/M2 | HEART RATE: 78 BPM | OXYGEN SATURATION: 93 %

## 2019-06-19 DIAGNOSIS — E03.9 ACQUIRED HYPOTHYROIDISM: ICD-10-CM

## 2019-06-19 DIAGNOSIS — G89.29 OTHER CHRONIC PAIN: ICD-10-CM

## 2019-06-19 DIAGNOSIS — I25.10 CORONARY ARTERY DISEASE WITHOUT ANGINA PECTORIS, UNSPECIFIED VESSEL OR LESION TYPE, UNSPECIFIED WHETHER NATIVE OR TRANSPLANTED HEART: ICD-10-CM

## 2019-06-19 DIAGNOSIS — F32.4 MAJOR DEPRESSIVE DISORDER WITH SINGLE EPISODE, IN PARTIAL REMISSION (HCC): ICD-10-CM

## 2019-06-19 DIAGNOSIS — I10 ESSENTIAL HYPERTENSION: ICD-10-CM

## 2019-06-19 DIAGNOSIS — N25.81 SECONDARY HYPERPARATHYROIDISM OF RENAL ORIGIN (HCC): ICD-10-CM

## 2019-06-19 DIAGNOSIS — M81.0 POSTMENOPAUSAL BONE LOSS: ICD-10-CM

## 2019-06-19 DIAGNOSIS — Z00.00 MEDICARE ANNUAL WELLNESS VISIT, SUBSEQUENT: ICD-10-CM

## 2019-06-19 DIAGNOSIS — K21.9 GASTROESOPHAGEAL REFLUX DISEASE WITHOUT ESOPHAGITIS: ICD-10-CM

## 2019-06-19 DIAGNOSIS — Z12.11 COLON CANCER SCREENING: ICD-10-CM

## 2019-06-19 DIAGNOSIS — N18.30 CKD (CHRONIC KIDNEY DISEASE) STAGE 3, GFR 30-59 ML/MIN (HCC): ICD-10-CM

## 2019-06-19 PROCEDURE — G0439 PPPS, SUBSEQ VISIT: HCPCS | Performed by: FAMILY MEDICINE

## 2019-06-19 PROCEDURE — 8041 PR SCP AHA: Performed by: FAMILY MEDICINE

## 2019-06-19 RX ORDER — OXYCODONE HYDROCHLORIDE AND ACETAMINOPHEN 5; 325 MG/1; MG/1
TABLET ORAL
COMMUNITY
Start: 2019-06-17 | End: 2021-11-10

## 2019-06-19 ASSESSMENT — PATIENT HEALTH QUESTIONNAIRE - PHQ9
SUM OF ALL RESPONSES TO PHQ QUESTIONS 1-9: 7
7. TROUBLE CONCENTRATING ON THINGS, SUCH AS READING THE NEWSPAPER OR WATCHING TELEVISION: MORE THAN HALF THE DAYS
1. LITTLE INTEREST OR PLEASURE IN DOING THINGS: NOT AT ALL
5. POOR APPETITE OR OVEREATING: NOT AT ALL
9. THOUGHTS THAT YOU WOULD BE BETTER OFF DEAD, OR OF HURTING YOURSELF: NOT AT ALL
CLINICAL INTERPRETATION OF PHQ2 SCORE: 1
7. TROUBLE CONCENTRATING ON THINGS, SUCH AS READING THE NEWSPAPER OR WATCHING TELEVISION: NOT AT ALL
6. FEELING BAD ABOUT YOURSELF - OR THAT YOU ARE A FAILURE OR HAVE LET YOURSELF OR YOUR FAMILY DOWN: NEARLY EVERY DAY
6. FEELING BAD ABOUT YOURSELF - OR THAT YOU ARE A FAILURE OR HAVE LET YOURSELF OR YOUR FAMILY DOWN: NEARLY EVERY DAY
3. TROUBLE FALLING OR STAYING ASLEEP OR SLEEPING TOO MUCH: NOT AT ALL
4. FEELING TIRED OR HAVING LITTLE ENERGY: NEARLY EVERY DAY
2. FEELING DOWN, DEPRESSED, IRRITABLE, OR HOPELESS: SEVERAL DAYS
1. LITTLE INTEREST OR PLEASURE IN DOING THINGS: NOT AT ALL
2. FEELING DOWN, DEPRESSED, IRRITABLE, OR HOPELESS: SEVERAL DAYS
SUM OF ALL RESPONSES TO PHQ QUESTIONS 1-9: 7
3. TROUBLE FALLING OR STAYING ASLEEP OR SLEEPING TOO MUCH: NOT AT ALL
SUM OF ALL RESPONSES TO PHQ9 QUESTIONS 1 AND 2: 1
4. FEELING TIRED OR HAVING LITTLE ENERGY: NEARLY EVERY DAY
5. POOR APPETITE OR OVEREATING: 0 - NOT AT ALL
SUM OF ALL RESPONSES TO PHQ9 QUESTIONS 1 AND 2: 1
5. POOR APPETITE OR OVEREATING: NOT AT ALL
8. MOVING OR SPEAKING SO SLOWLY THAT OTHER PEOPLE COULD HAVE NOTICED. OR THE OPPOSITE, BEING SO FIGETY OR RESTLESS THAT YOU HAVE BEEN MOVING AROUND A LOT MORE THAN USUAL: NOT AT ALL

## 2019-06-19 ASSESSMENT — ENCOUNTER SYMPTOMS: GENERAL WELL-BEING: FAIR

## 2019-06-19 ASSESSMENT — ACTIVITIES OF DAILY LIVING (ADL): BATHING_REQUIRES_ASSISTANCE: 0

## 2019-06-19 NOTE — PROGRESS NOTES
Chief Complaint   Patient presents with   • Annual Wellness Visit         HPI:  Anthony is a 87 y.o. here for Medicare Annual Wellness Visit    Medicare annual wellness visit, subsequent  Preventive measures and chronic medical history reviewed.    CKD (chronic kidney disease) stage 3, GFR 30-59 ml/min ([facility])/Secondary hyperparathyroidism of renal origin ([facility])  Patient has been asymptomatic. Last GFR was 38, creatinine 1.3.  As a result her PTH has been slightly high, however patient has normal calcium level.    Acquired hypothyroidism  He has been tolerating Levothyroxine, no palpitation, no cold or heat intolerance has been on levothyroxine 50 mcg daily    Essential hypertension  Has been adequately controlled on current medication. Denies headache, chest pain, and SOB.,  Has been on lisinopril 20 mg daily, and amlodipine 10 mg daily.  Was o    Gastroesophageal reflux disease without esophagitis  Patient reported no epigastric pain, heartburn, nausea, or vomiting.  Patient has been doing fine on sucralfate    Major depressive disorder with single episode, in partial remission ([facility])  Patient has been generally stable.  However her most sometimes fluctuates.  She has been doing fine on Cymbalta 90 mg, try to take the 60 mg does not work for her.  Denies any suicidal ideation.    Coronary artery disease without angina pectoris, unspecified vessel or lesion type, unspecified whether native or transplanted heart  Patient has history of CAD, however her last echocardiogram was essentially normal.  Patient denies any chest pain, shortness of breath.  She has been on aspirin, lisinopril, she is not on statin.    Other chronic pain  Patient has been having chronic lower back pain, in addition to that she stated that she has pain all over her body.  She has been following with his pain clinic, currently on Percocets and Lyrica.  She follows up with his pain clinic every month.    Due for bone density scan.  She is due for colon  cancer screening      Patient Active Problem List    Diagnosis Date Noted   • Tachyarrhythmia 04/28/2011     Priority: High   • Sinus bradycardia 07/26/2018   • SUMA (acute kidney injury) ([facility]) 07/25/2018   • Hypothyroidism 07/25/2018   • Normocytic anemia 07/25/2018   • Other hammer toe (acquired) 08/06/2015   • LBBB (left bundle branch block) 11/07/2013   • CKD (chronic kidney disease) stage 3, GFR 30-59 ml/min ([facility]) 11/07/2013   • Fatigue 11/07/2013   • Glaucoma 11/30/2012   • Toe dislocation 05/17/2012   • Major depression 10/27/2011   • HTN (hypertension) 04/28/2011   • GERD (gastroesophageal reflux disease) 04/28/2011   • Renal insufficiency 04/12/2011   • CATARACT    • Fibromyalgia    • IBS (irritable bowel syndrome)    • Dyslipidemia    • Hives        Current Outpatient Prescriptions   Medication Sig Dispense Refill   • oxyCODONE-acetaminophen (PERCOCET) 5-325 MG Tab      • lisinopril (PRINIVIL) 20 MG Tab Take 1 Tab by mouth 2 times a day. TAKE ONE TABLET BY MOUTH TWICE A  Tab 3   • DULoxetine (CYMBALTA) 30 MG Cap DR Particles Take 1 Cap by mouth every day. 90 Cap 1   • DULoxetine (CYMBALTA) 60 MG Cap DR Particles delayed-release capsule Take 1 Cap by mouth every day. 90 Cap 1   • pregabalin (LYRICA) 25 MG Cap Take 25 mg by mouth 3 times a day.     • amLODIPine (NORVASC) 10 MG Tab Take 1 Tab by mouth every day. 90 Tab 3   • levothyroxine (SYNTHROID) 50 MCG Tab Take 1 Tab by mouth Every morning on an empty stomach. ON EMPTY STOMACH 90 Tab 3   • cholecalciferol (D3 MAXIMUM STRENGTH) 5000 UNIT Cap Take 5,000 Units by mouth every day.     • Misc Natural Products (ENERGY FOCUS) Tab Take 1 Tab by mouth every day.     • Multiple Vitamins-Minerals (MULTIVITAMIN WOMEN PO) Take 1 tablet by mouth every day.     • Phenylephrine-Guaifenesin (MUCUS RELIEF D)  MG Tab Take 1 tablet by mouth 1 time daily as needed (allergies).     • Caffeine (STAY AWAKE PO) Take 1 Tab by mouth 1 time daily as needed (to stay  awake).     • Diclofenac Sodium 1 % Gel Apply 1 Dose to affected area(s) 4 times a day as needed (pain).     • tizanidine (ZANAFLEX) 4 MG capsule Take 4 mg by mouth every 6 hours as needed.     • brinzolamide (AZOPT) 1 % Suspension Place 1 Drop in both eyes 2 Times a Day.     • GLUCOSAMINE PO Take 1,000 mg by mouth every day.     • latanoprost (XALATAN) 0.005 % SOLN Place 1 Drop in both eyes every evening.     • sucralfate (CARAFATE) 1 GM Tab Take 1 Tab by mouth every day. (Patient not taking: Reported on 6/19/2019) 120 Tab 3   • lisinopril (PRINIVIL) 10 MG Tab Take 20 mg by mouth every 12 hours.     • hydrocodone-acetaminophen (NORCO) 7.5-325 MG per tablet Take 1 Tab by mouth 3 times a day as needed for Severe Pain.     • Ferrous Sulfate (IRON) 325 (65 FE) MG TABS Take 325 mg by mouth every 48 hours.     • alprazolam (XANAX) 0.25 MG TABS Take 1 Tab by mouth at bedtime as needed for Sleep. [employer] fax # [phone] (Patient not taking: Reported on 11/5/2018) 30 Each 0     No current facility-administered medications for this visit.         Patient is taking medications as noted in medication list.  Current supplements as per medication list.     Allergies: Nkda [no known drug allergy]    Current social contact/activities: PT reports living with spouse and a cat, visit with family and friends     Is patient current with immunizations? No, due for SHINGRIX (Shingles). Patient is interested in receiving NONE today.    She  reports that she has never smoked. She has never used smokeless tobacco. She reports that she does not drink alcohol or use drugs.  Counseling given: Not Answered        DPA/Advanced directive: Patient does not have an Advanced Directive.  A packet and workshop information was given on Advanced Directives.    ROS:    Gait: Uses no assistive device   Ostomy: No   Other tubes: No   Amputations: Yes L 2nd toe  Chronic oxygen use No   Last eye exam 6 months ago   Wears hearing aids: Yes   : Reports  urinary leakage during the last 6 months that has somewhat interfered with their daily activities or sleep.     Depression Screening    Little interest or pleasure in doing things?  0 - not at all  Feeling down, depressed, or hopeless? 1 - several days  Trouble falling or staying asleep, or sleeping too much?  0 - not at all  Feeling tired or having little energy?  3 - nearly every day  Poor appetite or overeating?  0 - not at all  Feeling bad about yourself - or that you are a failure or have let yourself or your family down? 3 - nearly every day  Trouble concentrating on things, such as reading the newspaper or watching television? 0 - not at all  Moving or speaking so slowly that other people could have noticed.  Or the opposite - being so fidgety or restless that you have been moving around a lot more than usual?  0 - not at all  Thoughts that you would be better off dead, or of hurting yourself?  0 - not at all  Patient Health Questionnaire Score: 7      If depressive symptoms identified deferred to follow up visit unless specifically addressed in assessment and plan.    Interpretation of PHQ-9 Total Score   Score Severity   1-4 No Depression   5-9 Mild Depression   10-14 Moderate Depression   15-19 Moderately Severe Depression   20-27 Severe Depression      Screening for Cognitive Impairment    Three Minute Recall (village, kitchen, baby)  2/3    Draw clock face with all 12 numbers and set the hands to show 10 past 10.  Yes 5/5  If cognitive concerns identified, deferred for follow up unless specifically addressed in assessment and plan.    Fall Risk Assessment    Has the patient had two or more falls in the last year or any fall with injury in the last year?  No  If fall risk identified, deferred for follow up unless specifically addressed in assessment and plan.      Safety Assessment    Throw rugs on floor.  Yes  Handrails on all stairs.  Yes  Good lighting in all hallways.  Yes  Difficulty hearing.   No  Patient counseled about all safety risks that were identified.    Functional Assessment ADLs    Are there any barriers preventing you from cooking for yourself or meeting nutritional needs?  No.    Are there any barriers preventing you from driving safely or obtaining transportation?  No.    Are there any barriers preventing you from using a telephone or calling for help?  No.    Are there any barriers preventing you from shopping?  No.    Are there any barriers preventing you from taking care of your own finances?  No.    Are there any barriers preventing you from managing your medications?    No.    Are there any barriers preventing you from showering, bathing or dressing yourself?  No.    Are you currently engaging in any exercise or physical activity?  No.     What is your perception of your health?  Fair.    Health Maintenance Summary                Annual Wellness Visit Overdue 9[birth year]31     IMM DTaP/Tdap/Td Vaccine Overdue 9/17/1950     IMM ZOSTER VACCINES Overdue 9/17/1981     COLONOSCOPY Overdue 11/2/2008      Previously completed 11/2/1998     MAMMOGRAM Overdue 11/30/2013      Patient Declined 11/30/2012     BONE DENSITY Overdue 5/12/2016      Done 5/12/2011 DS-BONE DENSITY STUDY (DEX[facility])    IMM INFLUENZA Next Due 9/1/2019           Patient Care Team:  Maggi Olivarez M.D. as PCP - General (Geriatrics)  Magdalena Johnson M.D. as Consulting Physician (Nephrology)      Social History   Substance Use Topics   • Smoking status: Never Smoker   • Smokeless tobacco: Never Used   • Alcohol use No     Family History   Problem Relation Age of Onset   • Heart Disease Father    • Lung Disease Father      She  has a past medical history of Anxiety; Arthritis; CAD (coronary artery disease); CATARACT; Dental disorder; Dyslipidemia; Fibromyalgia; GERD (gastroesophageal reflux disease); Glaucoma; Headache(784.0); Hives; Hives; HTN; IBS (irritable bowel syndrome); Indigestion; Myocardial infarct (HCC); Pain (7/31/15);  "Palpitations; Psychiatric problem; Snoring; Unspecified disorder of thyroid; and Unspecified urinary incontinence. She also has no past medical history of CHF (congestive heart failure) (formerly [facility]).   Past Surgical History:   Procedure Laterality Date   • TOE AMPUTATION Left 8/6/2015    Procedure: TOE AMPUTATION 2ND;  Surgeon: Fredi Nelson D.P.M.;  Location: SURGERY [facility];  Service:    • CATARACT EXTRACTION WITH IOL     • OTHER      cataracts removed         Exam:     /48 (BP Location: Right arm, Patient Position: Sitting, BP Cuff Size: Small adult)   Pulse 78   Temp 36.7 °C (98.1 °F) (Temporal)   Ht 1.51 m (4' 11.45\")   Wt 54.6 kg (120 lb 5.9 oz)   SpO2 93%  Body mass index is 23.95 kg/m².    Hearing, good.    Dentition, good  Alert, oriented in no acute distress.  Eye contact is good, speech goal directed, affect calm      Assessment and Plan. The following treatment and monitoring plan is recommended:    87 y.o. female     1. Medicare annual wellness visit, subsequent  Preventive measures and chronic medical history reviewed.    - Subsequent Annual Wellness Visit - Includes PPPS ()    2. CKD (chronic kidney disease) stage 3, GFR 30-59 ml/min ([facility])  Stable.  Less than 20.  Last GFR was 38, creatinine 1.3.  Recommend hydration and avoiding nephrotoxic medication.    - Subsequent Annual Wellness Visit - Includes PPPS ()  - Comp Metabolic Panel; Future    3. Acquired hypothyroidism  He has been tolerating Levothyroxine, no palpitation, no cold or heat intolerance  Continue on levothyroxine 50 mcg daily.    - Subsequent Annual Wellness Visit - Includes PPPS ()  - TSH+FREE T4    4. Essential hypertension  Has been adequately controlled on current medication. Denies headache, chest pain, and SOB.  Continue on lisinopril 20 mg daily, and amlodipine 10 mg daily.    - Subsequent Annual Wellness Visit - Includes PPPS ()  - CBC WITH DIFFERENTIAL; Future  - Comp Metabolic Panel; " Future  - TSH+FREE T4  - Lipid Profile; Future    5. Gastroesophageal reflux disease without esophagitis  Patient has been doing fine on sucralfate    - Subsequent Annual Wellness Visit - Includes PPPS ()    6. Major depressive disorder with single episode, in partial remission (HCC)  Stable.  Continue on Cymbalta DR 60 mg daily.    - Subsequent Annual Wellness Visit - Includes PPPS ()    7. Coronary artery disease without angina pectoris, unspecified vessel or lesion type, unspecified whether native or transplanted heart  Patient has history of CAD, however her last echocardiogram was essentially normal.  Continue on aspirin, lisinopril, she is not on statin.    - Subsequent Annual Wellness Visit - Includes PPPS ()    8. Other chronic pain  Probably her lower back pain, however patient stated that she has pain all over her body.  She has been following with his pain clinic, currently on Percocets and Lyrica.    - Subsequent Annual Wellness Visit - Includes PPPS ()    9. Postmenopausal bone loss  Due for bone density scan    - DS-BONE DENSITY STUDY ([facility]); Future  - Subsequent Annual Wellness Visit - Includes PPPS ()    10. Colon cancer screening    - OCCULT BLOOD FECES IMMUNOASSAY (FIT); Future  - Subsequent Annual Wellness Visit - Includes PPPS ()    11. Secondary hyperparathyroidism of renal origin (HCC)  Asymptomatic.  Has been having normal calcium level.  Patient has CKD stage III, GFR was 38, creatinine 1.3, patient has been stable.  Recommend hydration avoiding nephrotoxic medication.          Services suggested:   Health Care Screening recommendations as per orders if indicated.  Referrals offered: PT/OT/Nutrition counseling/Behavioral Health/Smoking cessation as per orders if indicated.    Discussion today about general wellness and lifestyle habits:    · Prevent falls and reduce trip hazards; Cautioned about securing or removing rugs.  · Have a working fire alarm and carbon  monoxide detector;   · Engage in regular physical activity and social activities       Follow-up: No Follow-up on file.

## 2019-06-22 NOTE — TELEPHONE ENCOUNTER
Was the patient seen in the last year in this department? Yes     Does patient have an active prescription for medications requested? No     Received Request Via: Pharmacy   5 days

## 2019-07-11 ENCOUNTER — HOSPITAL ENCOUNTER (OUTPATIENT)
Dept: LAB | Facility: MEDICAL CENTER | Age: 84
End: 2019-07-11
Attending: INTERNAL MEDICINE
Payer: MEDICARE

## 2019-07-11 LAB
25(OH)D3 SERPL-MCNC: 40 NG/ML (ref 30–100)
ALBUMIN SERPL BCP-MCNC: 3.9 G/DL (ref 3.2–4.9)
BUN SERPL-MCNC: 33 MG/DL (ref 8–22)
CALCIUM SERPL-MCNC: 9.1 MG/DL (ref 8.5–10.5)
CHLORIDE SERPL-SCNC: 106 MMOL/L (ref 96–112)
CO2 SERPL-SCNC: 24 MMOL/L (ref 20–33)
CREAT SERPL-MCNC: 1.43 MG/DL (ref 0.5–1.4)
CREAT UR-MCNC: 49.7 MG/DL
FERRITIN SERPL-MCNC: 37.5 NG/ML (ref 10–291)
GLUCOSE SERPL-MCNC: 95 MG/DL (ref 65–99)
HCT VFR BLD AUTO: 33.7 % (ref 37–47)
HGB BLD-MCNC: 11.1 G/DL (ref 12–16)
IRON SATN MFR SERPL: 24 % (ref 15–55)
IRON SERPL-MCNC: 83 UG/DL (ref 40–170)
PHOSPHATE SERPL-MCNC: 4.9 MG/DL (ref 2.5–4.5)
POTASSIUM SERPL-SCNC: 4.3 MMOL/L (ref 3.6–5.5)
PROT UR-MCNC: 7 MG/DL (ref 0–15)
PROT/CREAT UR: 141 MG/G (ref 10–107)
PTH-INTACT SERPL-MCNC: 65.9 PG/ML (ref 14–72)
SODIUM SERPL-SCNC: 141 MMOL/L (ref 135–145)
TIBC SERPL-MCNC: 340 UG/DL (ref 250–450)
URATE SERPL-MCNC: 4.5 MG/DL (ref 1.9–8.2)

## 2019-07-11 PROCEDURE — 85018 HEMOGLOBIN: CPT

## 2019-07-11 PROCEDURE — 82570 ASSAY OF URINE CREATININE: CPT

## 2019-07-11 PROCEDURE — 36415 COLL VENOUS BLD VENIPUNCTURE: CPT

## 2019-07-11 PROCEDURE — 82306 VITAMIN D 25 HYDROXY: CPT

## 2019-07-11 PROCEDURE — 84550 ASSAY OF BLOOD/URIC ACID: CPT

## 2019-07-11 PROCEDURE — 83540 ASSAY OF IRON: CPT

## 2019-07-11 PROCEDURE — 82728 ASSAY OF FERRITIN: CPT

## 2019-07-11 PROCEDURE — 85014 HEMATOCRIT: CPT

## 2019-07-11 PROCEDURE — 80069 RENAL FUNCTION PANEL: CPT

## 2019-07-11 PROCEDURE — 83970 ASSAY OF PARATHORMONE: CPT

## 2019-07-11 PROCEDURE — 83550 IRON BINDING TEST: CPT

## 2019-07-11 PROCEDURE — 84156 ASSAY OF PROTEIN URINE: CPT

## 2019-07-13 ENCOUNTER — HOSPITAL ENCOUNTER (OUTPATIENT)
Facility: MEDICAL CENTER | Age: 84
End: 2019-07-13
Attending: FAMILY MEDICINE
Payer: MEDICARE

## 2019-07-13 PROCEDURE — 82274 ASSAY TEST FOR BLOOD FECAL: CPT

## 2019-07-16 DIAGNOSIS — Z12.11 COLON CANCER SCREENING: ICD-10-CM

## 2019-07-17 LAB — HEMOCCULT STL QL IA: NEGATIVE

## 2019-08-27 RX ORDER — DULOXETIN HYDROCHLORIDE 60 MG/1
CAPSULE, DELAYED RELEASE ORAL
Qty: 90 CAP | Refills: 1 | Status: SHIPPED | OUTPATIENT
Start: 2019-08-27 | End: 2020-05-06

## 2019-08-27 RX ORDER — DULOXETIN HYDROCHLORIDE 30 MG/1
CAPSULE, DELAYED RELEASE ORAL
Qty: 90 CAP | Refills: 1 | Status: SHIPPED | OUTPATIENT
Start: 2019-08-27 | End: 2020-05-06

## 2019-09-18 ENCOUNTER — OFFICE VISIT (OUTPATIENT)
Dept: MEDICAL GROUP | Facility: MEDICAL CENTER | Age: 84
End: 2019-09-18
Payer: MEDICARE

## 2019-09-18 VITALS
TEMPERATURE: 97.1 F | HEIGHT: 59 IN | DIASTOLIC BLOOD PRESSURE: 68 MMHG | WEIGHT: 120 LBS | RESPIRATION RATE: 16 BRPM | HEART RATE: 71 BPM | OXYGEN SATURATION: 96 % | BODY MASS INDEX: 24.19 KG/M2 | SYSTOLIC BLOOD PRESSURE: 138 MMHG

## 2019-09-18 DIAGNOSIS — N18.30 CKD (CHRONIC KIDNEY DISEASE) STAGE 3, GFR 30-59 ML/MIN (HCC): ICD-10-CM

## 2019-09-18 DIAGNOSIS — E03.9 ACQUIRED HYPOTHYROIDISM: ICD-10-CM

## 2019-09-18 DIAGNOSIS — I10 ESSENTIAL HYPERTENSION: ICD-10-CM

## 2019-09-18 PROCEDURE — 99214 OFFICE O/P EST MOD 30 MIN: CPT | Performed by: FAMILY MEDICINE

## 2019-09-18 NOTE — PROGRESS NOTES
CC: Hypertension, CKD, hypothyroidism    HPI:   Anthony presents today discuss the following medical issues:    Essential hypertension  Has been adequately controlled on current medication. Denies headache, chest pain, and SOB.,  Has been on lisinopril 20 mg daily, and amlodipine 10 mg daily.     CKD (chronic kidney disease) stage 3, GFR 30-59 ml/min ([facility])  Patient has been asymptomatic. Last GFR was 35, Cr 1.4( was GFR 38, creatinine 1.3). .     Acquired hypothyroidism  He has been tolerating Levothyroxine, no palpitation, no cold or heat intolerance has been on levothyroxine 50 mcg daily.  Last TSH was normal.       Patient Active Problem List    Diagnosis Date Noted   • Tachyarrhythmia 04/28/2011     Priority: High   • Sinus bradycardia 07/26/2018   • SUMA (acute kidney injury) ([facility]) 07/25/2018   • Hypothyroidism 07/25/2018   • Normocytic anemia 07/25/2018   • Other hammer toe (acquired) 08/06/2015   • LBBB (left bundle branch block) 11/07/2013   • CKD (chronic kidney disease) stage 3, GFR 30-59 ml/min ([facility]) 11/07/2013   • Fatigue 11/07/2013   • Glaucoma 11/30/2012   • Toe dislocation 05/17/2012   • Major depression 10/27/2011   • HTN (hypertension) 04/28/2011   • GERD (gastroesophageal reflux disease) 04/28/2011   • Renal insufficiency 04/12/2011   • CATARACT    • Fibromyalgia    • IBS (irritable bowel syndrome)    • Dyslipidemia    • Hives        Current Outpatient Medications   Medication Sig Dispense Refill   • DULoxetine (CYMBALTA) 60 MG Cap DR Particles delayed-release capsule TAKE ONE CAPSULE BY MOUTH EVERY DAY 90 Cap 1   • DULoxetine (CYMBALTA) 30 MG Cap DR Particles TAKE ONE CAPSULE BY MOUTH EVERY DAY 90 Cap 1   • oxyCODONE-acetaminophen (PERCOCET) 5-325 MG Tab      • lisinopril (PRINIVIL) 20 MG Tab Take 1 Tab by mouth 2 times a day. TAKE ONE TABLET BY MOUTH TWICE A  Tab 3   • pregabalin (LYRICA) 25 MG Cap Take 25 mg by mouth 3 times a day.     • amLODIPine (NORVASC) 10 MG Tab Take 1 Tab by mouth  every day. 90 Tab 3   • sucralfate (CARAFATE) 1 GM Tab Take 1 Tab by mouth every day. (Patient not taking: Reported on 6/19/2019) 120 Tab 3   • levothyroxine (SYNTHROID) 50 MCG Tab Take 1 Tab by mouth Every morning on an empty stomach. ON EMPTY STOMACH 90 Tab 3   • lisinopril (PRINIVIL) 10 MG Tab Take 20 mg by mouth every 12 hours.     • cholecalciferol (D3 MAXIMUM STRENGTH) 5000 UNIT Cap Take 5,000 Units by mouth every day.     • Misc Natural Products (ENERGY FOCUS) Tab Take 1 Tab by mouth every day.     • Multiple Vitamins-Minerals (MULTIVITAMIN WOMEN PO) Take 1 tablet by mouth every day.     • Phenylephrine-Guaifenesin (MUCUS RELIEF D)  MG Tab Take 1 tablet by mouth 1 time daily as needed (allergies).     • Caffeine (STAY AWAKE PO) Take 1 Tab by mouth 1 time daily as needed (to stay awake).     • Diclofenac Sodium 1 % Gel Apply 1 Dose to affected area(s) 4 times a day as needed (pain).     • tizanidine (ZANAFLEX) 4 MG capsule Take 4 mg by mouth every 6 hours as needed.     • brinzolamide (AZOPT) 1 % Suspension Place 1 Drop in both eyes 2 Times a Day.     • hydrocodone-acetaminophen (NORCO) 7.5-325 MG per tablet Take 1 Tab by mouth 3 times a day as needed for Severe Pain.     • Ferrous Sulfate (IRON) 325 (65 FE) MG TABS Take 325 mg by mouth every 48 hours.     • alprazolam (XANAX) 0.25 MG TABS Take 1 Tab by mouth at bedtime as needed for Sleep. [employer] fax # [phone] (Patient not taking: Reported on 11/5/2018) 30 Each 0   • GLUCOSAMINE PO Take 1,000 mg by mouth every day.     • latanoprost (XALATAN) 0.005 % SOLN Place 1 Drop in both eyes every evening.       No current facility-administered medications for this visit.          Allergies as of 09/18/2019 - Reviewed 06/19/2019   Allergen Reaction Noted   • Nkda [no known drug allergy]  01/11/2011        ROS: Denies any chest pain, Shortness of breath, Changes bowel or bladder, Lower extremity edema.    Physical Exam:  /68 (BP Location: Right arm,  "Patient Position: Sitting, BP Cuff Size: Adult)   Pulse 71   Temp 36.2 °C (97.1 °F) (Temporal)   Resp 16   Ht 1.499 m (4' 11\")   Wt 54.4 kg (120 lb)   SpO2 96%   BMI 24.24 kg/m²   Gen.: Well-developed, well-nourished, no apparent distress,pleasant and cooperative with the examination  Skin:  Warm and dry with good turgor. No rashes or suspicious lesions in visible areas  HEENT:Sinuses nontender with palpation, TMs clear, nares patent with pink mucosa and clear rhinorrhea,no septal deviation ,polyps or lesions. lips without lesions, oropharynx clear.  Neck: Trachea midline,no masses or adenopathy. No JVD.  Cor: Regular rate and rhythm without murmur, gallop or rub.  Lungs: Respirations unlabored.Clear to auscultation with equal breath sounds bilaterally. No wheezes, rhonchi.  Extremities: No cyanosis, clubbing or edema.        Assessment and Plan.   88 y.o. female     1. Essential hypertension  Medically controlled.  Continue on lisinopril 20 mg, amlodipine 10 mg.    - Lipid Profile; Future    2. CKD (chronic kidney disease) stage 3, GFR 30-59 ml/min ([facility])  Most recent GFR was 55, creatinine 1.4.  Slight deterioration of kidney function.  Recommend hydration and avoiding nephrotoxic medication.  Continue monitor kidney function.    - CBC WITH DIFFERENTIAL; Future    3. Acquired hypothyroidism  He has been tolerating Levothyroxine, no palpitation, no cold or heat intolerance  Continue levothyroxine 50 mcg daily.  Due for thyroid function check.    - TSH; Future  - FREE THYROXINE; Future        "

## 2019-09-24 RX ORDER — LISINOPRIL 20 MG/1
TABLET ORAL
Qty: 200 TAB | Refills: 1 | Status: SHIPPED | OUTPATIENT
Start: 2019-09-24 | End: 2021-07-30

## 2019-12-17 ENCOUNTER — HOSPITAL ENCOUNTER (OUTPATIENT)
Dept: LAB | Facility: MEDICAL CENTER | Age: 84
End: 2019-12-17
Attending: FAMILY MEDICINE
Payer: MEDICARE

## 2019-12-17 ENCOUNTER — HOSPITAL ENCOUNTER (OUTPATIENT)
Dept: LAB | Facility: MEDICAL CENTER | Age: 84
End: 2019-12-17
Attending: INTERNAL MEDICINE
Payer: MEDICARE

## 2019-12-17 DIAGNOSIS — E03.9 ACQUIRED HYPOTHYROIDISM: ICD-10-CM

## 2019-12-17 DIAGNOSIS — N18.30 CKD (CHRONIC KIDNEY DISEASE) STAGE 3, GFR 30-59 ML/MIN (HCC): ICD-10-CM

## 2019-12-17 DIAGNOSIS — I10 ESSENTIAL HYPERTENSION: ICD-10-CM

## 2019-12-17 LAB
ALBUMIN SERPL BCP-MCNC: 4.3 G/DL (ref 3.2–4.9)
ALBUMIN SERPL BCP-MCNC: 4.3 G/DL (ref 3.2–4.9)
ALBUMIN/GLOB SERPL: 1.4 G/DL
ALP SERPL-CCNC: 63 U/L (ref 30–99)
ALT SERPL-CCNC: 15 U/L (ref 2–50)
ANION GAP SERPL CALC-SCNC: 7 MMOL/L (ref 0–11.9)
AST SERPL-CCNC: 21 U/L (ref 12–45)
BASOPHILS # BLD AUTO: 0.3 % (ref 0–1.8)
BASOPHILS # BLD: 0.02 K/UL (ref 0–0.12)
BILIRUB SERPL-MCNC: 0.3 MG/DL (ref 0.1–1.5)
BUN SERPL-MCNC: 37 MG/DL (ref 8–22)
BUN SERPL-MCNC: 40 MG/DL (ref 8–22)
CALCIUM SERPL-MCNC: 9.2 MG/DL (ref 8.5–10.5)
CALCIUM SERPL-MCNC: 9.4 MG/DL (ref 8.5–10.5)
CHLORIDE SERPL-SCNC: 104 MMOL/L (ref 96–112)
CHLORIDE SERPL-SCNC: 106 MMOL/L (ref 96–112)
CHOLEST SERPL-MCNC: 202 MG/DL (ref 100–199)
CO2 SERPL-SCNC: 26 MMOL/L (ref 20–33)
CO2 SERPL-SCNC: 27 MMOL/L (ref 20–33)
CREAT SERPL-MCNC: 1.43 MG/DL (ref 0.5–1.4)
CREAT SERPL-MCNC: 1.43 MG/DL (ref 0.5–1.4)
EOSINOPHIL # BLD AUTO: 0.06 K/UL (ref 0–0.51)
EOSINOPHIL NFR BLD: 0.8 % (ref 0–6.9)
ERYTHROCYTE [DISTWIDTH] IN BLOOD BY AUTOMATED COUNT: 52.4 FL (ref 35.9–50)
FASTING STATUS PATIENT QL REPORTED: NORMAL
GLOBULIN SER CALC-MCNC: 3 G/DL (ref 1.9–3.5)
GLUCOSE SERPL-MCNC: 87 MG/DL (ref 65–99)
GLUCOSE SERPL-MCNC: 89 MG/DL (ref 65–99)
HCT VFR BLD AUTO: 36.6 % (ref 37–47)
HCT VFR BLD AUTO: 37.5 % (ref 37–47)
HDLC SERPL-MCNC: 78 MG/DL
HGB BLD-MCNC: 11.7 G/DL (ref 12–16)
HGB BLD-MCNC: 11.8 G/DL (ref 12–16)
IMM GRANULOCYTES # BLD AUTO: 0.02 K/UL (ref 0–0.11)
IMM GRANULOCYTES NFR BLD AUTO: 0.3 % (ref 0–0.9)
LDLC SERPL CALC-MCNC: 109 MG/DL
LYMPHOCYTES # BLD AUTO: 2.87 K/UL (ref 1–4.8)
LYMPHOCYTES NFR BLD: 38 % (ref 22–41)
MCH RBC QN AUTO: 30.8 PG (ref 27–33)
MCHC RBC AUTO-ENTMCNC: 31.5 G/DL (ref 33.6–35)
MCV RBC AUTO: 97.9 FL (ref 81.4–97.8)
MONOCYTES # BLD AUTO: 0.66 K/UL (ref 0–0.85)
MONOCYTES NFR BLD AUTO: 8.7 % (ref 0–13.4)
NEUTROPHILS # BLD AUTO: 3.93 K/UL (ref 2–7.15)
NEUTROPHILS NFR BLD: 51.9 % (ref 44–72)
NRBC # BLD AUTO: 0 K/UL
NRBC BLD-RTO: 0 /100 WBC
PHOSPHATE SERPL-MCNC: 4.1 MG/DL (ref 2.5–4.5)
PLATELET # BLD AUTO: 335 K/UL (ref 164–446)
PMV BLD AUTO: 10.5 FL (ref 9–12.9)
POTASSIUM SERPL-SCNC: 4.6 MMOL/L (ref 3.6–5.5)
POTASSIUM SERPL-SCNC: 4.8 MMOL/L (ref 3.6–5.5)
PROT SERPL-MCNC: 7.3 G/DL (ref 6–8.2)
RBC # BLD AUTO: 3.83 M/UL (ref 4.2–5.4)
SODIUM SERPL-SCNC: 138 MMOL/L (ref 135–145)
SODIUM SERPL-SCNC: 140 MMOL/L (ref 135–145)
T4 FREE SERPL-MCNC: 0.83 NG/DL (ref 0.53–1.43)
TRIGL SERPL-MCNC: 73 MG/DL (ref 0–149)
TSH SERPL DL<=0.005 MIU/L-ACNC: 2.61 UIU/ML (ref 0.38–5.33)
WBC # BLD AUTO: 7.6 K/UL (ref 4.8–10.8)

## 2019-12-17 PROCEDURE — 36415 COLL VENOUS BLD VENIPUNCTURE: CPT

## 2019-12-17 PROCEDURE — 80061 LIPID PANEL: CPT

## 2019-12-17 PROCEDURE — 84443 ASSAY THYROID STIM HORMONE: CPT

## 2019-12-17 PROCEDURE — 85018 HEMOGLOBIN: CPT

## 2019-12-17 PROCEDURE — 85014 HEMATOCRIT: CPT

## 2019-12-17 PROCEDURE — 80053 COMPREHEN METABOLIC PANEL: CPT

## 2019-12-17 PROCEDURE — 80069 RENAL FUNCTION PANEL: CPT

## 2019-12-17 PROCEDURE — 84439 ASSAY OF FREE THYROXINE: CPT

## 2019-12-17 PROCEDURE — 85025 COMPLETE CBC W/AUTO DIFF WBC: CPT

## 2019-12-18 ENCOUNTER — HOSPITAL ENCOUNTER (OUTPATIENT)
Dept: RADIOLOGY | Facility: MEDICAL CENTER | Age: 84
End: 2019-12-18
Attending: FAMILY MEDICINE
Payer: MEDICARE

## 2019-12-18 DIAGNOSIS — M81.0 POSTMENOPAUSAL BONE LOSS: ICD-10-CM

## 2019-12-18 PROCEDURE — 77080 DXA BONE DENSITY AXIAL: CPT

## 2019-12-30 DIAGNOSIS — K29.50 CHRONIC GASTRITIS WITHOUT BLEEDING, UNSPECIFIED GASTRITIS TYPE: ICD-10-CM

## 2019-12-30 RX ORDER — SUCRALFATE 1 G/1
1 TABLET ORAL DAILY
Qty: 120 TAB | Refills: 3 | Status: SHIPPED | OUTPATIENT
Start: 2019-12-30 | End: 2021-07-30

## 2020-01-24 RX ORDER — LEVOTHYROXINE SODIUM 0.05 MG/1
TABLET ORAL
Qty: 90 TAB | Refills: 3 | Status: SHIPPED | OUTPATIENT
Start: 2020-01-24 | End: 2020-06-08 | Stop reason: SDUPTHER

## 2020-02-10 DIAGNOSIS — I10 ESSENTIAL HYPERTENSION: ICD-10-CM

## 2020-02-10 RX ORDER — AMLODIPINE BESYLATE 10 MG/1
10 TABLET ORAL DAILY
Qty: 90 TAB | Refills: 3 | Status: SHIPPED | OUTPATIENT
Start: 2020-02-10 | End: 2020-06-08 | Stop reason: SDUPTHER

## 2020-04-29 ENCOUNTER — TELEPHONE (OUTPATIENT)
Dept: HEALTH INFORMATION MANAGEMENT | Facility: OTHER | Age: 85
End: 2020-04-29

## 2020-04-29 NOTE — TELEPHONE ENCOUNTER
Outcome: AWV scheduled    Please transfer to Patient Outreach Team at [phone] when patient returns call.    [employer] Checked & Epic Updated:  no    [facility] Verified: no    Attempt # 1

## 2020-05-06 RX ORDER — DULOXETIN HYDROCHLORIDE 30 MG/1
CAPSULE, DELAYED RELEASE ORAL
Qty: 90 CAP | Refills: 1 | Status: SHIPPED
Start: 2020-05-06 | End: 2020-11-09

## 2020-05-06 RX ORDER — DULOXETIN HYDROCHLORIDE 60 MG/1
CAPSULE, DELAYED RELEASE ORAL
Qty: 90 CAP | Refills: 1 | Status: SHIPPED | OUTPATIENT
Start: 2020-05-06 | End: 2020-06-08 | Stop reason: SDUPTHER

## 2020-05-14 RX ORDER — TIZANIDINE HYDROCHLORIDE 4 MG/1
4 CAPSULE, GELATIN COATED ORAL EVERY 6 HOURS PRN
Qty: 100 CAP | Refills: 1 | Status: SHIPPED | OUTPATIENT
Start: 2020-05-14 | End: 2020-06-08 | Stop reason: SDUPTHER

## 2020-06-08 DIAGNOSIS — I10 ESSENTIAL HYPERTENSION: ICD-10-CM

## 2020-06-08 RX ORDER — AMLODIPINE BESYLATE 10 MG/1
10 TABLET ORAL DAILY
Qty: 90 TAB | Refills: 3 | Status: SHIPPED | OUTPATIENT
Start: 2020-06-08 | End: 2021-07-22

## 2020-06-08 RX ORDER — LEVOTHYROXINE SODIUM 0.05 MG/1
50 TABLET ORAL
Qty: 90 TAB | Refills: 3 | Status: SHIPPED | OUTPATIENT
Start: 2020-06-08 | End: 2021-03-15

## 2020-06-08 RX ORDER — TIZANIDINE HYDROCHLORIDE 4 MG/1
4 CAPSULE, GELATIN COATED ORAL EVERY 6 HOURS PRN
Qty: 100 CAP | Refills: 1 | Status: SHIPPED | OUTPATIENT
Start: 2020-06-08 | End: 2021-07-30

## 2020-06-08 RX ORDER — DULOXETIN HYDROCHLORIDE 60 MG/1
60 CAPSULE, DELAYED RELEASE ORAL
Qty: 90 CAP | Refills: 3 | Status: SHIPPED | OUTPATIENT
Start: 2020-06-08 | End: 2021-09-08

## 2020-06-08 RX ORDER — LISINOPRIL 20 MG/1
20 TABLET ORAL 2 TIMES DAILY
Qty: 200 TAB | Refills: 3 | Status: SHIPPED | OUTPATIENT
Start: 2020-06-08 | End: 2021-08-30

## 2020-07-06 ENCOUNTER — TELEPHONE (OUTPATIENT)
Dept: MEDICAL GROUP | Facility: MEDICAL CENTER | Age: 85
End: 2020-07-06

## 2020-07-06 DIAGNOSIS — I10 ESSENTIAL HYPERTENSION: ICD-10-CM

## 2020-07-06 DIAGNOSIS — N18.30 CKD (CHRONIC KIDNEY DISEASE) STAGE 3, GFR 30-59 ML/MIN: ICD-10-CM

## 2020-07-06 DIAGNOSIS — E03.4 HYPOTHYROIDISM DUE TO ACQUIRED ATROPHY OF THYROID: ICD-10-CM

## 2020-07-20 ENCOUNTER — HOSPITAL ENCOUNTER (OUTPATIENT)
Dept: LAB | Facility: MEDICAL CENTER | Age: 85
End: 2020-07-20
Attending: FAMILY MEDICINE
Payer: MEDICARE

## 2020-07-20 ENCOUNTER — HOSPITAL ENCOUNTER (OUTPATIENT)
Dept: LAB | Facility: MEDICAL CENTER | Age: 85
End: 2020-07-20
Attending: INTERNAL MEDICINE
Payer: MEDICARE

## 2020-07-20 DIAGNOSIS — N18.30 CKD (CHRONIC KIDNEY DISEASE) STAGE 3, GFR 30-59 ML/MIN: ICD-10-CM

## 2020-07-20 DIAGNOSIS — I10 ESSENTIAL HYPERTENSION: ICD-10-CM

## 2020-07-20 LAB
25(OH)D3 SERPL-MCNC: 39 NG/ML (ref 30–100)
ALBUMIN SERPL BCP-MCNC: 4.1 G/DL (ref 3.2–4.9)
ALBUMIN SERPL BCP-MCNC: 4.1 G/DL (ref 3.2–4.9)
ALBUMIN/GLOB SERPL: 1.5 G/DL
ALP SERPL-CCNC: 63 U/L (ref 30–99)
ALT SERPL-CCNC: 13 U/L (ref 2–50)
ANION GAP SERPL CALC-SCNC: 13 MMOL/L (ref 7–16)
AST SERPL-CCNC: 25 U/L (ref 12–45)
BASOPHILS # BLD AUTO: 0.3 % (ref 0–1.8)
BASOPHILS # BLD AUTO: 0.3 % (ref 0–1.8)
BASOPHILS # BLD: 0.02 K/UL (ref 0–0.12)
BASOPHILS # BLD: 0.02 K/UL (ref 0–0.12)
BILIRUB SERPL-MCNC: 0.2 MG/DL (ref 0.1–1.5)
BUN SERPL-MCNC: 26 MG/DL (ref 8–22)
BUN SERPL-MCNC: 26 MG/DL (ref 8–22)
CALCIUM SERPL-MCNC: 9.5 MG/DL (ref 8.5–10.5)
CALCIUM SERPL-MCNC: 9.5 MG/DL (ref 8.5–10.5)
CHLORIDE SERPL-SCNC: 105 MMOL/L (ref 96–112)
CHLORIDE SERPL-SCNC: 105 MMOL/L (ref 96–112)
CHOLEST SERPL-MCNC: 192 MG/DL (ref 100–199)
CO2 SERPL-SCNC: 25 MMOL/L (ref 20–33)
CO2 SERPL-SCNC: 25 MMOL/L (ref 20–33)
CREAT SERPL-MCNC: 1.41 MG/DL (ref 0.5–1.4)
CREAT SERPL-MCNC: 1.44 MG/DL (ref 0.5–1.4)
CREAT UR-MCNC: 237.63 MG/DL
EOSINOPHIL # BLD AUTO: 0.16 K/UL (ref 0–0.51)
EOSINOPHIL # BLD AUTO: 0.17 K/UL (ref 0–0.51)
EOSINOPHIL NFR BLD: 2.2 % (ref 0–6.9)
EOSINOPHIL NFR BLD: 2.4 % (ref 0–6.9)
ERYTHROCYTE [DISTWIDTH] IN BLOOD BY AUTOMATED COUNT: 51.4 FL (ref 35.9–50)
ERYTHROCYTE [DISTWIDTH] IN BLOOD BY AUTOMATED COUNT: 52.2 FL (ref 35.9–50)
FASTING STATUS PATIENT QL REPORTED: NORMAL
GLOBULIN SER CALC-MCNC: 2.8 G/DL (ref 1.9–3.5)
GLUCOSE SERPL-MCNC: 93 MG/DL (ref 65–99)
GLUCOSE SERPL-MCNC: 94 MG/DL (ref 65–99)
HCT VFR BLD AUTO: 36.7 % (ref 37–47)
HCT VFR BLD AUTO: 36.7 % (ref 37–47)
HDLC SERPL-MCNC: 84 MG/DL
HGB BLD-MCNC: 11.3 G/DL (ref 12–16)
HGB BLD-MCNC: 11.4 G/DL (ref 12–16)
IMM GRANULOCYTES # BLD AUTO: 0.01 K/UL (ref 0–0.11)
IMM GRANULOCYTES # BLD AUTO: 0.03 K/UL (ref 0–0.11)
IMM GRANULOCYTES NFR BLD AUTO: 0.1 % (ref 0–0.9)
IMM GRANULOCYTES NFR BLD AUTO: 0.4 % (ref 0–0.9)
LDLC SERPL CALC-MCNC: 94 MG/DL
LYMPHOCYTES # BLD AUTO: 2.38 K/UL (ref 1–4.8)
LYMPHOCYTES # BLD AUTO: 2.41 K/UL (ref 1–4.8)
LYMPHOCYTES NFR BLD: 33.3 % (ref 22–41)
LYMPHOCYTES NFR BLD: 33.8 % (ref 22–41)
MCH RBC QN AUTO: 30.4 PG (ref 27–33)
MCH RBC QN AUTO: 30.6 PG (ref 27–33)
MCHC RBC AUTO-ENTMCNC: 30.8 G/DL (ref 33.6–35)
MCHC RBC AUTO-ENTMCNC: 31.1 G/DL (ref 33.6–35)
MCV RBC AUTO: 98.7 FL (ref 81.4–97.8)
MCV RBC AUTO: 98.7 FL (ref 81.4–97.8)
MONOCYTES # BLD AUTO: 0.6 K/UL (ref 0–0.85)
MONOCYTES # BLD AUTO: 0.6 K/UL (ref 0–0.85)
MONOCYTES NFR BLD AUTO: 8.4 % (ref 0–13.4)
MONOCYTES NFR BLD AUTO: 8.4 % (ref 0–13.4)
NEUTROPHILS # BLD AUTO: 3.9 K/UL (ref 2–7.15)
NEUTROPHILS # BLD AUTO: 3.98 K/UL (ref 2–7.15)
NEUTROPHILS NFR BLD: 54.7 % (ref 44–72)
NEUTROPHILS NFR BLD: 55.7 % (ref 44–72)
NRBC # BLD AUTO: 0 K/UL
NRBC # BLD AUTO: 0 K/UL
NRBC BLD-RTO: 0 /100 WBC
NRBC BLD-RTO: 0 /100 WBC
PHOSPHATE SERPL-MCNC: 3.7 MG/DL (ref 2.5–4.5)
PLATELET # BLD AUTO: 305 K/UL (ref 164–446)
PLATELET # BLD AUTO: 319 K/UL (ref 164–446)
PMV BLD AUTO: 10.4 FL (ref 9–12.9)
PMV BLD AUTO: 10.4 FL (ref 9–12.9)
POTASSIUM SERPL-SCNC: 4.7 MMOL/L (ref 3.6–5.5)
POTASSIUM SERPL-SCNC: 5 MMOL/L (ref 3.6–5.5)
PROT SERPL-MCNC: 6.9 G/DL (ref 6–8.2)
PROT UR-MCNC: 37 MG/DL (ref 0–15)
PROT/CREAT UR: 156 MG/G (ref 10–107)
PTH-INTACT SERPL-MCNC: 53.8 PG/ML (ref 14–72)
RBC # BLD AUTO: 3.72 M/UL (ref 4.2–5.4)
RBC # BLD AUTO: 3.72 M/UL (ref 4.2–5.4)
SODIUM SERPL-SCNC: 142 MMOL/L (ref 135–145)
SODIUM SERPL-SCNC: 143 MMOL/L (ref 135–145)
T4 FREE SERPL-MCNC: 1.12 NG/DL (ref 0.93–1.7)
TRIGL SERPL-MCNC: 72 MG/DL (ref 0–149)
TSH SERPL DL<=0.005 MIU/L-ACNC: 3.34 UIU/ML (ref 0.38–5.33)
URATE SERPL-MCNC: 5.1 MG/DL (ref 1.9–8.2)
WBC # BLD AUTO: 7.1 K/UL (ref 4.8–10.8)
WBC # BLD AUTO: 7.2 K/UL (ref 4.8–10.8)

## 2020-07-20 PROCEDURE — 85025 COMPLETE CBC W/AUTO DIFF WBC: CPT | Mod: 91

## 2020-07-20 PROCEDURE — 80053 COMPREHEN METABOLIC PANEL: CPT

## 2020-07-20 PROCEDURE — 84550 ASSAY OF BLOOD/URIC ACID: CPT

## 2020-07-20 PROCEDURE — 84443 ASSAY THYROID STIM HORMONE: CPT

## 2020-07-20 PROCEDURE — 36415 COLL VENOUS BLD VENIPUNCTURE: CPT

## 2020-07-20 PROCEDURE — 80061 LIPID PANEL: CPT

## 2020-07-20 PROCEDURE — 84439 ASSAY OF FREE THYROXINE: CPT

## 2020-07-20 PROCEDURE — 85025 COMPLETE CBC W/AUTO DIFF WBC: CPT

## 2020-07-20 PROCEDURE — 80069 RENAL FUNCTION PANEL: CPT

## 2020-07-20 PROCEDURE — 83970 ASSAY OF PARATHORMONE: CPT

## 2020-07-20 PROCEDURE — 82306 VITAMIN D 25 HYDROXY: CPT

## 2020-07-27 ENCOUNTER — OFFICE VISIT (OUTPATIENT)
Dept: MEDICAL GROUP | Facility: MEDICAL CENTER | Age: 85
End: 2020-07-27
Payer: MEDICARE

## 2020-07-27 VITALS
OXYGEN SATURATION: 97 % | SYSTOLIC BLOOD PRESSURE: 112 MMHG | WEIGHT: 116 LBS | TEMPERATURE: 98.6 F | BODY MASS INDEX: 23.39 KG/M2 | HEIGHT: 59 IN | DIASTOLIC BLOOD PRESSURE: 60 MMHG | HEART RATE: 64 BPM

## 2020-07-27 DIAGNOSIS — Z00.00 MEDICARE ANNUAL WELLNESS VISIT, SUBSEQUENT: ICD-10-CM

## 2020-07-27 DIAGNOSIS — N25.81 SECONDARY HYPERPARATHYROIDISM OF RENAL ORIGIN (HCC): ICD-10-CM

## 2020-07-27 DIAGNOSIS — K29.50 CHRONIC GASTRITIS WITHOUT BLEEDING, UNSPECIFIED GASTRITIS TYPE: ICD-10-CM

## 2020-07-27 DIAGNOSIS — G89.29 OTHER CHRONIC PAIN: ICD-10-CM

## 2020-07-27 DIAGNOSIS — F32.4 MAJOR DEPRESSIVE DISORDER WITH SINGLE EPISODE, IN PARTIAL REMISSION (HCC): ICD-10-CM

## 2020-07-27 DIAGNOSIS — E03.4 HYPOTHYROIDISM DUE TO ACQUIRED ATROPHY OF THYROID: ICD-10-CM

## 2020-07-27 DIAGNOSIS — Z00.00 HEALTHCARE MAINTENANCE: ICD-10-CM

## 2020-07-27 DIAGNOSIS — I10 ESSENTIAL HYPERTENSION: ICD-10-CM

## 2020-07-27 DIAGNOSIS — N18.30 CKD (CHRONIC KIDNEY DISEASE) STAGE 3, GFR 30-59 ML/MIN: ICD-10-CM

## 2020-07-27 DIAGNOSIS — I25.10 CORONARY ARTERY DISEASE WITHOUT ANGINA PECTORIS, UNSPECIFIED VESSEL OR LESION TYPE, UNSPECIFIED WHETHER NATIVE OR TRANSPLANTED HEART: ICD-10-CM

## 2020-07-27 DIAGNOSIS — E03.9 ACQUIRED HYPOTHYROIDISM: ICD-10-CM

## 2020-07-27 PROCEDURE — G0439 PPPS, SUBSEQ VISIT: HCPCS | Performed by: FAMILY MEDICINE

## 2020-07-27 ASSESSMENT — PATIENT HEALTH QUESTIONNAIRE - PHQ9: CLINICAL INTERPRETATION OF PHQ2 SCORE: 0

## 2020-07-27 ASSESSMENT — FIBROSIS 4 INDEX: FIB4 SCORE: 1.91

## 2020-07-27 ASSESSMENT — ENCOUNTER SYMPTOMS: GENERAL WELL-BEING: GOOD

## 2020-07-27 ASSESSMENT — ACTIVITIES OF DAILY LIVING (ADL): BATHING_REQUIRES_ASSISTANCE: 0

## 2020-07-27 NOTE — PROGRESS NOTES
Chief Complaint   Patient presents with   • Annual Wellness Visit         HPI:  KEREN is a 88 y.o. here for Medicare Annual Wellness Visit    Medicare annual wellness visit, subsequent  Preventive measures and chronic medical history reviewed.     CKD (chronic kidney disease) stage 3, GFR 30-59 ml/min ([facility])/Secondary hyperparathyroidism of renal origin ([facility])  Patient has been asymptomatic. Last GFR was 35, creatinine 1.4.  As a result her PTH has been normal, with normal calcium level.     Acquired hypothyroidism  He has been tolerating Levothyroxine, no palpitation, no cold or heat intolerance has been on levothyroxine 50 mcg daily     Essential hypertension  Has been adequately controlled on current medication. Denies headache, chest pain, and SOB.,  Has been on lisinopril 20 mg daily, and amlodipine 10 mg daily.  Was o     Chronic gastritis without bleeding, unspecified gastritis type  Patient has been on Carafate 1 mg once a day, lately has not been working.  Has been having epigastric pain on and off, denies any nausea, or vomiting.  Denies hematemesis or melena.    Major depressive disorder with single episode, in partial remission ([facility])  Mood has been fluctuating but mostly stable. She has been doing fine on Cymbalta 90 mg. Denies any suicidal ideation.     Coronary artery disease without angina pectoris, unspecified vessel or lesion type, unspecified whether native or transplanted heart  Patient has history of CAD, however her last echocardiogram was essentially normal.  Patient denies any chest pain, shortness of breath.  She has been on aspirin, lisinopril, she is not on statin.     Chronic pain  Patient has been having chronic lower back pain, in addition joint pain all over the body. She has been following with his pain clinic, currently on Percocets and Lyrica.  She follows up with his pain clinic every month.  Patient Active Problem List    Diagnosis Date Noted   • Tachyarrhythmia 04/28/2011      Priority: High   • Sinus bradycardia 07/26/2018   • SUMA (acute kidney injury) ([facility]) 07/25/2018   • Hypothyroidism 07/25/2018   • Normocytic anemia 07/25/2018   • Other hammer toe (acquired) 08/06/2015   • LBBB (left bundle branch block) 11/07/2013   • CKD (chronic kidney disease) stage 3, GFR 30-59 ml/min ([facility]) 11/07/2013   • Fatigue 11/07/2013   • Glaucoma 11/30/2012   • Toe dislocation 05/17/2012   • Major depression 10/27/2011   • HTN (hypertension) 04/28/2011   • GERD (gastroesophageal reflux disease) 04/28/2011   • Renal insufficiency 04/12/2011   • CATARACT    • Fibromyalgia    • IBS (irritable bowel syndrome)    • Dyslipidemia    • Hives        Current Outpatient Medications   Medication Sig Dispense Refill   • amLODIPine (NORVASC) 10 MG Tab Take 1 Tab by mouth every day. 90 Tab 3   • DULoxetine (CYMBALTA) 60 MG Cap DR Particles delayed-release capsule Take 1 Cap by mouth every day. 90 Cap 3   • levothyroxine (SYNTHROID) 50 MCG Tab Take 1 Tab by mouth every morning before breakfast. TAKE ONE TABLET BY MOUTH EVERY MORNING ON EMPTY STOMACH 90 Tab 3   • lisinopril (PRINIVIL) 20 MG Tab Take 1 Tab by mouth 2 times a day. TAKE ONE TABLET BY MOUTH TWICE A  Tab 3   • tizanidine (ZANAFLEX) 4 MG capsule Take 1 Cap by mouth every 6 hours as needed. Indications: Muscle Spasticity 100 Cap 1   • DULoxetine (CYMBALTA) 30 MG Cap DR Particles TAKE ONE CAPSULE BY MOUTH EVERY DAY 90 Cap 1   • sucralfate (CARAFATE) 1 GM Tab Take 1 Tab by mouth every day. 120 Tab 3   • lisinopril (PRINIVIL) 20 MG Tab TAKE ONE TABLET BY MOUTH TWICE A  Tab 1   • oxyCODONE-acetaminophen (PERCOCET) 5-325 MG Tab      • pregabalin (LYRICA) 25 MG Cap Take 25 mg by mouth 3 times a day.     • lisinopril (PRINIVIL) 10 MG Tab Take 20 mg by mouth every 12 hours.     • cholecalciferol (D3 MAXIMUM STRENGTH) 5000 UNIT Cap Take 5,000 Units by mouth every day.     • Misc Natural Products (ENERGY FOCUS) Tab Take 1 Tab by mouth every day.     •  Multiple Vitamins-Minerals (MULTIVITAMIN WOMEN PO) Take 1 tablet by mouth every day.     • Phenylephrine-Guaifenesin (MUCUS RELIEF D)  MG Tab Take 1 tablet by mouth 1 time daily as needed (allergies).     • Caffeine (STAY AWAKE PO) Take 1 Tab by mouth 1 time daily as needed (to stay awake).     • Diclofenac Sodium 1 % Gel Apply 1 Dose to affected area(s) 4 times a day as needed (pain).     • brinzolamide (AZOPT) 1 % Suspension Place 1 Drop in both eyes 2 Times a Day.     • hydrocodone-acetaminophen (NORCO) 7.5-325 MG per tablet Take 1 Tab by mouth 3 times a day as needed for Severe Pain.     • Ferrous Sulfate (IRON) 325 (65 FE) MG TABS Take 325 mg by mouth every 48 hours.     • alprazolam (XANAX) 0.25 MG TABS Take 1 Tab by mouth at bedtime as needed for Sleep. [employer] fax # [phone] (Patient not taking: Reported on 11/5/2018) 30 Each 0   • GLUCOSAMINE PO Take 1,000 mg by mouth every day.     • latanoprost (XALATAN) 0.005 % SOLN Place 1 Drop in both eyes every evening.       No current facility-administered medications for this visit.         Patient is taking medications as noted in medication list.  Current supplements as per medication list.     Allergies: Nkda [no known drug allergy]    Current social contact/activities:     Is patient current with immunizations? Yes.    She  reports that she has never smoked. She has never used smokeless tobacco. She reports that she does not drink alcohol or use drugs.  Counseling given: Not Answered        DPA/Advanced directive: Patient does not have an Advanced Directive.  A packet and workshop information was given on Advanced Directives.    ROS:    Gait: Uses no assistive device   Ostomy: No   Other tubes: No   Amputations: Yes   Chronic oxygen use No   Last eye exam 01/2020  Wears hearing aids: No   : Denies any urinary leakage during the last 6 months      Screening:        Depression Screening    Little interest or pleasure in doing things?  0 - not at  all  Feeling down, depressed, or hopeless? 0 - not at all  Patient Health Questionnaire Score: 0    If depressive symptoms identified deferred to follow up visit unless specifically addressed in assessment and plan.    Interpretation of PHQ-9 Total Score   Score Severity   1-4 No Depression   5-9 Mild Depression   10-14 Moderate Depression   15-19 Moderately Severe Depression   20-27 Severe Depression    Screening for Cognitive Impairment    Three Minute Recall (river, christine, finger)  3/3    Cole clock face with all 12 numbers and set the hands to show 10 past 11.  Yes    If cognitive concerns identified, deferred for follow up unless specifically addressed in assessment and plan.    Fall Risk Assessment    Has the patient had two or more falls in the last year or any fall with injury in the last year?  No  If fall risk identified, deferred for follow up unless specifically addressed in assessment and plan.    Safety Assessment    Throw rugs on floor.  No  Handrails on all stairs.  Yes  Good lighting in all hallways.  Yes  Difficulty hearing.  No  Patient counseled about all safety risks that were identified.    Functional Assessment ADLs    Are there any barriers preventing you from cooking for yourself or meeting nutritional needs?  No.    Are there any barriers preventing you from driving safely or obtaining transportation?  No.    Are there any barriers preventing you from using a telephone or calling for help?  No.    Are there any barriers preventing you from shopping?  No.    Are there any barriers preventing you from taking care of your own finances?  No.    Are there any barriers preventing you from managing your medications?  No.    Are there any barriers preventing you from showering, bathing or dressing yourself?  No.    Are you currently engaging in any exercise or physical activity?  Yes.     What is your perception of your health?  Good.    Health Maintenance Summary                IMM DTaP/Tdap/Td  Vaccine Overdue 9/17/1950     IMM ZOSTER VACCINES Overdue 9/17/1981     IMM PNEUMOCOCCAL VACCINE: 65+ Years Overdue 7/26/2019      Done 7/26/2018 Imm Admin: Pneumococcal Conjugate Vaccine (Prevnar/PCV-13)     Patient has more history with this topic...    Annual Wellness Visit Overdue 6/19/2020      Done 6/19/2019 SUBSEQUENT ANNUAL WELLNESS VISIT-INCLUDES PPPS ()     Patient has more history with this topic...    IMM INFLUENZA Next Due 9/1/2020     BONE DENSITY Next Due 12/18/2024      Done 12/18/2019 DS-BONE DENSITY STUDY (DEXA)     Patient has more history with this topic...          Patient Care Team:  Maggi Olivarez M.D. as PCP - General (Geriatrics)  Magdalena Johnson M.D. as Consulting Physician (Nephrology)  Yee Hart as      Social History     Tobacco Use   • Smoking status: Never Smoker   • Smokeless tobacco: Never Used   Substance Use Topics   • Alcohol use: No   • Drug use: No     Family History   Problem Relation Age of Onset   • Heart Disease Father    • Lung Disease Father      She  has a past medical history of Anxiety, Arthritis, CAD (coronary artery disease), CATARACT, Dental disorder, Dyslipidemia, Fibromyalgia, GERD (gastroesophageal reflux disease), Glaucoma, Headache(784.0), Hives, Hives, HTN, IBS (irritable bowel syndrome), Indigestion, Myocardial infarct (HCC), Pain (7/31/15), Palpitations, Psychiatric problem, Snoring, Unspecified disorder of thyroid, and Unspecified urinary incontinence. She also has no past medical history of CHF (congestive heart failure) ([facility]).   Past Surgical History:   Procedure Laterality Date   • TOE AMPUTATION Left 8/6/2015    Procedure: TOE AMPUTATION 2ND;  Surgeon: Fredi Nelson D.P.M.;  Location: SURGERY [facility];  Service:    • CATARACT EXTRACTION WITH IOL     • OTHER      cataracts removed           Exam:     /60 (BP Location: Left arm, Patient Position: Sitting, BP Cuff Size: Adult)   Pulse  "64   Temp 37 °C (98.6 °F)   Ht 1.499 m (4' 11\")   Wt 52.6 kg (116 lb)   SpO2 97%  Body mass index is 23.43 kg/m².    Hearing, mild difficulty of hearing.    Dentition, normal  Alert, oriented in no acute distress.  Eye contact is good, speech goal directed, affect calm      Assessment and Plan. The following treatment and monitoring plan is recommended:    88 y.o. female     1. Essential hypertension  Controlled.  Continue on lisinopril 20 mg daily, and amlodipine 10 mg daily    - Subsequent Annual Wellness Visit - Includes PPPS ()    2. CKD (chronic kidney disease) stage 3, GFR 30-59 ml/min ([facility])  Last GFR was 35, creatinine 1.4.  Patient is counseled on hydration and avoiding nephrotoxic medication.    - Subsequent Annual Wellness Visit - Includes PPPS ()    3. Hypothyroidism due to acquired atrophy of thyroid  He has been tolerating Levothyroxine, no palpitation, no cold or heat intolerance  Continue levothyroxine 50 mcg daily.    - Subsequent Annual Wellness Visit - Includes PPPS ()    4. Chronic gastritis without bleeding, unspecified gastritis type  Patient has been on Carafate, lately has not been working.  Will start patient on omeprazole 20 mg twice a day for 14 days.  Advised to call me back if symptoms persist, and I will check her for breath test to rule out H. pylori infection.    - Subsequent Annual Wellness Visit - Includes PPPS ()    5. Major depressive disorder with single episode, in partial remission ([facility])  Stable.  Continue on Cymbalta 90 mg daily.    - Subsequent Annual Wellness Visit - Includes PPPS ()    6. Coronary artery disease without angina pectoris, unspecified vessel or lesion type, unspecified whether native or transplanted heart  Stable.  Asymptomatic.  Continue on aspirin, and statin.    - Subsequent Annual Wellness Visit - Includes PPPS ()    7. Other chronic pain  Stable.    Continue on Percocets and Lyrica.     Continue follow up with pain clinic " every month.    - Subsequent Annual Wellness Visit - Includes PPPS ()    10. Healthcare maintenance  We discussed vaccinations next visit, currently not ready.    - Subsequent Annual Wellness Visit - Includes PPPS ()    11. Medicare annual wellness visit, subsequent  Preventive measures and chronic medical history reviewed.    - Subsequent Annual Wellness Visit - Includes PPPS ()           Services suggested:   Health Care Screening recommendations as per orders if indicated.  Referrals offered: PT/OT/Nutrition counseling/Behavioral Health/Smoking cessation as per orders if indicated.    Discussion today about general wellness and lifestyle habits:    · Prevent falls and reduce trip hazards; Cautioned about securing or removing rugs.  · Have a working fire alarm and carbon monoxide detector;   · Engage in regular physical activity and social activities       Follow-up: No follow-ups on file.

## 2020-11-09 ENCOUNTER — OFFICE VISIT (OUTPATIENT)
Dept: MEDICAL GROUP | Facility: MEDICAL CENTER | Age: 85
End: 2020-11-09
Payer: MEDICARE

## 2020-11-09 VITALS
SYSTOLIC BLOOD PRESSURE: 154 MMHG | OXYGEN SATURATION: 97 % | BODY MASS INDEX: 22.58 KG/M2 | WEIGHT: 112 LBS | HEIGHT: 59 IN | DIASTOLIC BLOOD PRESSURE: 62 MMHG | RESPIRATION RATE: 16 BRPM | TEMPERATURE: 97.9 F | HEART RATE: 67 BPM

## 2020-11-09 DIAGNOSIS — Z23 NEED FOR VACCINATION: ICD-10-CM

## 2020-11-09 DIAGNOSIS — K21.9 GASTROESOPHAGEAL REFLUX DISEASE WITHOUT ESOPHAGITIS: ICD-10-CM

## 2020-11-09 DIAGNOSIS — I10 ESSENTIAL HYPERTENSION: ICD-10-CM

## 2020-11-09 DIAGNOSIS — F32.1 CURRENT MODERATE EPISODE OF MAJOR DEPRESSIVE DISORDER WITHOUT PRIOR EPISODE (HCC): ICD-10-CM

## 2020-11-09 PROCEDURE — G0009 ADMIN PNEUMOCOCCAL VACCINE: HCPCS | Performed by: FAMILY MEDICINE

## 2020-11-09 PROCEDURE — 99214 OFFICE O/P EST MOD 30 MIN: CPT | Mod: 25 | Performed by: FAMILY MEDICINE

## 2020-11-09 PROCEDURE — 90732 PPSV23 VACC 2 YRS+ SUBQ/IM: CPT | Performed by: FAMILY MEDICINE

## 2020-11-09 RX ORDER — FAMOTIDINE 20 MG/1
20 TABLET, FILM COATED ORAL DAILY
Qty: 90 TAB | Refills: 3 | Status: SHIPPED
Start: 2020-11-09 | End: 2021-09-23

## 2020-11-09 RX ORDER — BUPROPION HYDROCHLORIDE 150 MG/1
150 TABLET ORAL EVERY MORNING
Qty: 90 TAB | Refills: 2 | Status: SHIPPED
Start: 2020-11-09 | End: 2021-09-23

## 2020-11-09 ASSESSMENT — PATIENT HEALTH QUESTIONNAIRE - PHQ9
1. LITTLE INTEREST OR PLEASURE IN DOING THINGS: 2
5. POOR APPETITE OR OVEREATING: 1
SUM OF ALL RESPONSES TO PHQ QUESTIONS 1-9: 11
SUM OF ALL RESPONSES TO PHQ9 QUESTIONS 1 AND 2: 3
7. TROUBLE CONCENTRATING ON THINGS, SUCH AS READING THE NEWSPAPER OR WATCHING TELEVISION: 1
6. FEELING BAD ABOUT YOURSELF - OR THAT YOU ARE A FAILURE OR HAVE LET YOURSELF OR YOUR FAMILY DOWN: 1
8. MOVING OR SPEAKING SO SLOWLY THAT OTHER PEOPLE COULD HAVE NOTICED. OR THE OPPOSITE, BEING SO FIGETY OR RESTLESS THAT YOU HAVE BEEN MOVING AROUND A LOT MORE THAN USUAL: 2
3. TROUBLE FALLING OR STAYING ASLEEP OR SLEEPING TOO MUCH: 2
4. FEELING TIRED OR HAVING LITTLE ENERGY: 1
2. FEELING DOWN, DEPRESSED, IRRITABLE, OR HOPELESS: 1
9. THOUGHTS THAT YOU WOULD BE BETTER OFF DEAD, OR OF HURTING YOURSELF: 0

## 2020-11-09 ASSESSMENT — FIBROSIS 4 INDEX: FIB4 SCORE: 1.93

## 2020-11-09 NOTE — PROGRESS NOTES
CC: Depression, acid reflux, hypertension    HPI:   Anthony presents today to discuss the following medical issues    Current moderate episode of major depressive disorder without prior episode (HCC)  Patient stating that her mood has worsened for the past 2 weeks.  Patient feels that she does not enjoy what she used to enjoy.  She does not want to do anything.  However she denies any suicidal ideation.  She is currently on duloxetine 60 mg daily, it was working before, but now it does not.       Gastroesophageal reflux disease without esophagitis  Symptoms has improved with use of omeprazole for 2 weeks, symptoms comes back when she stopped the medication.  She has been having epigastric pain that gets better after omeprazole, coming back after she stops it.  Denies any hematemesis, or blood in the stool    Essential hypertension  Has been adequately controlled on current medication. Denies headache, chest pain, and SOB. Patient has been on on amlodipine 10 mg daily, and lisinopril 20 mg daily.  No side effects    Patient is due for pneumonia 23.    Patient Active Problem List    Diagnosis Date Noted   • Tachyarrhythmia 04/28/2011     Priority: High   • Sinus bradycardia 07/26/2018   • SUMA (acute kidney injury) (HCC) 07/25/2018   • Hypothyroidism 07/25/2018   • Normocytic anemia 07/25/2018   • Other hammer toe (acquired) 08/06/2015   • LBBB (left bundle branch block) 11/07/2013   • CKD (chronic kidney disease) stage 3, GFR 30-59 ml/min 11/07/2013   • Fatigue 11/07/2013   • Glaucoma 11/30/2012   • Toe dislocation 05/17/2012   • Major depression 10/27/2011   • HTN (hypertension) 04/28/2011   • GERD (gastroesophageal reflux disease) 04/28/2011   • Renal insufficiency 04/12/2011   • CATARACT    • Fibromyalgia    • IBS (irritable bowel syndrome)    • Dyslipidemia    • Hives        Current Outpatient Medications   Medication Sig Dispense Refill   • buPROPion (WELLBUTRIN XL) 150 MG XL tablet Take 1 Tab by mouth every  morning. 90 Tab 2   • famotidine (PEPCID) 20 MG Tab Take 1 Tab by mouth every day. 90 Tab 3   • amLODIPine (NORVASC) 10 MG Tab Take 1 Tab by mouth every day. 90 Tab 3   • DULoxetine (CYMBALTA) 60 MG Cap DR Particles delayed-release capsule Take 1 Cap by mouth every day. 90 Cap 3   • levothyroxine (SYNTHROID) 50 MCG Tab Take 1 Tab by mouth every morning before breakfast. TAKE ONE TABLET BY MOUTH EVERY MORNING ON EMPTY STOMACH 90 Tab 3   • lisinopril (PRINIVIL) 20 MG Tab Take 1 Tab by mouth 2 times a day. TAKE ONE TABLET BY MOUTH TWICE A  Tab 3   • tizanidine (ZANAFLEX) 4 MG capsule Take 1 Cap by mouth every 6 hours as needed. Indications: Muscle Spasticity 100 Cap 1   • sucralfate (CARAFATE) 1 GM Tab Take 1 Tab by mouth every day. 120 Tab 3   • lisinopril (PRINIVIL) 20 MG Tab TAKE ONE TABLET BY MOUTH TWICE A  Tab 1   • oxyCODONE-acetaminophen (PERCOCET) 5-325 MG Tab      • pregabalin (LYRICA) 25 MG Cap Take 25 mg by mouth 3 times a day.     • lisinopril (PRINIVIL) 10 MG Tab Take 20 mg by mouth every 12 hours.     • cholecalciferol (D3 MAXIMUM STRENGTH) 5000 UNIT Cap Take 5,000 Units by mouth every day.     • Misc Natural Products (ENERGY FOCUS) Tab Take 1 Tab by mouth every day.     • Multiple Vitamins-Minerals (MULTIVITAMIN WOMEN PO) Take 1 tablet by mouth every day.     • Phenylephrine-Guaifenesin (MUCUS RELIEF D)  MG Tab Take 1 tablet by mouth 1 time daily as needed (allergies).     • Caffeine (STAY AWAKE PO) Take 1 Tab by mouth 1 time daily as needed (to stay awake).     • Diclofenac Sodium 1 % Gel Apply 1 Dose to affected area(s) 4 times a day as needed (pain).     • brinzolamide (AZOPT) 1 % Suspension Place 1 Drop in both eyes 2 Times a Day.     • hydrocodone-acetaminophen (NORCO) 7.5-325 MG per tablet Take 1 Tab by mouth 3 times a day as needed for Severe Pain.     • Ferrous Sulfate (IRON) 325 (65 FE) MG TABS Take 325 mg by mouth every 48 hours.     • alprazolam (XANAX) 0.25 MG TABS Take 1  "Tab by mouth at bedtime as needed for Sleep. [employer] fax # [phone] (Patient not taking: Reported on 11/5/2018) 30 Each 0   • GLUCOSAMINE PO Take 1,000 mg by mouth every day.     • latanoprost (XALATAN) 0.005 % SOLN Place 1 Drop in both eyes every evening.       No current facility-administered medications for this visit.          Allergies as of 11/09/2020 - Reviewed 11/09/2020   Allergen Reaction Noted   • Nkda [no known drug allergy]  01/11/2011        ROS: Denies any chest pain, Shortness of breath, Changes bowel or bladder, Lower extremity edema.    Physical Exam:  /62 (BP Location: Right arm, Patient Position: Sitting, BP Cuff Size: Adult)   Pulse 67   Temp 36.6 °C (97.9 °F) (Temporal)   Resp 16   Ht 1.499 m (4' 11\")   Wt 50.8 kg (112 lb)   SpO2 97%   BMI 22.62 kg/m²   Gen.: Well-developed, well-nourished, no apparent distress,pleasant and cooperative with the examination  Skin:  Warm and dry with good turgor. No rashes or suspicious lesions in visible areas  Eye: PERRLA, conjunctiva and sclera clear, lids normal  HEENT:Sinuses nontender with palpation, TMs clear, nares patent with pink mucosa, and clear rhinorrhea,no septal deviation ,polyps or lesions. lips without lesions, oropharynx clear.  Neck: Trachea midline,no masses or adenopathy. No JVD.  Thyroid: normal consistency and size. No masses or nodules. Not tender with palpation.  Cor: Regular rate and rhythm without murmur, gallop or rub.  Lungs: Respirations unlabored.Clear to auscultation with equal breath sounds bilaterally. No wheezes, rhonchi.  Abdomen: Soft nontender without hepatosplenomegaly or masses appreciated, normoactive bowel sounds. No hernias.  Extremities: No cyanosis, clubbing or edema, Symmetrical without deformities or malformations. Pulses 2+ and symmetrical both upper and lower extremities  Lymphatic: No abnormal adenopathy of the neck, upper chest, groin or axillae.  Psych: Alert and oriented x 3.Flat affect, normal " judgement,insight and memory.  PHQ Score: 11    Assessment and Plan.   89 y.o. female     1. Current moderate episode of major depressive disorder without prior episode (HCC)  Patient stating that her mood has worsened.  Currently on duloxetine 60 mg daily, it was working for.  Denies any suicidal ideation.  PHQ score is 11.  Continue duloxetine 60 mg daily  We will add Wellbutrin 150 mg once a day.    - buPROPion (WELLBUTRIN XL) 150 MG XL tablet; Take 1 Tab by mouth every morning.  Dispense: 90 Tab; Refill: 2    2. Gastroesophageal reflux disease without esophagitis  Symptoms has improved with use of omeprazole for 2 weeks, symptoms comes back when she stopped the medication  We will start the patient on famotidine 20 mg daily.  We will continue to monitor her symptoms    - famotidine (PEPCID) 20 MG Tab; Take 1 Tab by mouth every day.  Dispense: 90 Tab; Refill: 3    3. Essential hypertension  Controlled.  Continue on amlodipine 10 mg daily, and lisinopril 20 mg daily    4. Need for vaccination  She is due for pneumonia 23 shot.    - Pneumococal Polysaccharide Vaccine 23-Valent =>3YO SQ/IM

## 2021-01-11 DIAGNOSIS — Z23 NEED FOR VACCINATION: ICD-10-CM

## 2021-03-15 RX ORDER — LEVOTHYROXINE SODIUM 0.05 MG/1
TABLET ORAL
Qty: 90 TABLET | Refills: 3 | Status: SHIPPED | OUTPATIENT
Start: 2021-03-15 | End: 2022-07-05 | Stop reason: SDUPTHER

## 2021-03-16 ENCOUNTER — TELEPHONE (OUTPATIENT)
Dept: MEDICAL GROUP | Facility: MEDICAL CENTER | Age: 86
End: 2021-03-16

## 2021-03-16 NOTE — TELEPHONE ENCOUNTER
left a voicemail regarding wife's medicine modafinil. I called back and they did not answer, will try again later

## 2021-04-15 NOTE — TELEPHONE ENCOUNTER
I recommend to increase lisinopril to 40 mg daily.   0 = understands/communicates without difficulty

## 2021-05-19 ENCOUNTER — PATIENT MESSAGE (OUTPATIENT)
Dept: HEALTH INFORMATION MANAGEMENT | Facility: OTHER | Age: 86
End: 2021-05-19

## 2021-06-18 ENCOUNTER — PATIENT OUTREACH (OUTPATIENT)
Dept: HEALTH INFORMATION MANAGEMENT | Facility: OTHER | Age: 86
End: 2021-06-18

## 2021-06-18 NOTE — PROGRESS NOTES
Outcome: Will call mbr back to schedule In-home Comprehensive Health Assessment.    Mbr would like to schedule in-home Comprehensive Health Assessment. Unable to connect with [facility] at this time. Will call mbr back to schedule 06/22/21 in the afternoon.  Please transfer to Patient Outreach Team at [phone] when patient returns call.    Attempt # 2

## 2021-06-22 ENCOUNTER — TELEPHONE (OUTPATIENT)
Dept: MEDICAL GROUP | Facility: MEDICAL CENTER | Age: 86
End: 2021-06-22

## 2021-06-22 DIAGNOSIS — I10 ESSENTIAL HYPERTENSION: ICD-10-CM

## 2021-06-22 DIAGNOSIS — E03.4 HYPOTHYROIDISM DUE TO ACQUIRED ATROPHY OF THYROID: ICD-10-CM

## 2021-06-25 ENCOUNTER — HOSPITAL ENCOUNTER (OUTPATIENT)
Dept: LAB | Facility: MEDICAL CENTER | Age: 86
End: 2021-06-25
Attending: INTERNAL MEDICINE
Payer: MEDICARE

## 2021-06-25 ENCOUNTER — HOSPITAL ENCOUNTER (OUTPATIENT)
Dept: LAB | Facility: MEDICAL CENTER | Age: 86
End: 2021-06-25
Attending: FAMILY MEDICINE
Payer: MEDICARE

## 2021-06-25 DIAGNOSIS — I10 ESSENTIAL HYPERTENSION: ICD-10-CM

## 2021-06-25 LAB
ALBUMIN SERPL BCP-MCNC: 4.1 G/DL (ref 3.2–4.9)
ALBUMIN SERPL BCP-MCNC: 4.3 G/DL (ref 3.2–4.9)
ALBUMIN/GLOB SERPL: 1.5 G/DL
ALP SERPL-CCNC: 75 U/L (ref 30–99)
ALT SERPL-CCNC: 12 U/L (ref 2–50)
ANION GAP SERPL CALC-SCNC: 10 MMOL/L (ref 7–16)
AST SERPL-CCNC: 21 U/L (ref 12–45)
BASOPHILS # BLD AUTO: 0.3 % (ref 0–1.8)
BASOPHILS # BLD: 0.02 K/UL (ref 0–0.12)
BILIRUB SERPL-MCNC: <0.2 MG/DL (ref 0.1–1.5)
BUN SERPL-MCNC: 32 MG/DL (ref 8–22)
BUN SERPL-MCNC: 35 MG/DL (ref 8–22)
CALCIUM SERPL-MCNC: 9.1 MG/DL (ref 8.5–10.5)
CALCIUM SERPL-MCNC: 9.1 MG/DL (ref 8.5–10.5)
CHLORIDE SERPL-SCNC: 108 MMOL/L (ref 96–112)
CHLORIDE SERPL-SCNC: 110 MMOL/L (ref 96–112)
CHOLEST SERPL-MCNC: 223 MG/DL (ref 100–199)
CO2 SERPL-SCNC: 19 MMOL/L (ref 20–33)
CO2 SERPL-SCNC: 21 MMOL/L (ref 20–33)
CREAT SERPL-MCNC: 1.44 MG/DL (ref 0.5–1.4)
CREAT SERPL-MCNC: 1.44 MG/DL (ref 0.5–1.4)
EOSINOPHIL # BLD AUTO: 0.16 K/UL (ref 0–0.51)
EOSINOPHIL NFR BLD: 2.2 % (ref 0–6.9)
ERYTHROCYTE [DISTWIDTH] IN BLOOD BY AUTOMATED COUNT: 52.1 FL (ref 35.9–50)
FASTING STATUS PATIENT QL REPORTED: NORMAL
FERRITIN SERPL-MCNC: 48.9 NG/ML (ref 10–291)
GLOBULIN SER CALC-MCNC: 2.9 G/DL (ref 1.9–3.5)
GLUCOSE SERPL-MCNC: 96 MG/DL (ref 65–99)
GLUCOSE SERPL-MCNC: 97 MG/DL (ref 65–99)
HCT VFR BLD AUTO: 38.3 % (ref 37–47)
HCT VFR BLD AUTO: 38.4 % (ref 37–47)
HDLC SERPL-MCNC: 75 MG/DL
HGB BLD-MCNC: 11.7 G/DL (ref 12–16)
HGB BLD-MCNC: 11.8 G/DL (ref 12–16)
IMM GRANULOCYTES # BLD AUTO: 0.02 K/UL (ref 0–0.11)
IMM GRANULOCYTES NFR BLD AUTO: 0.3 % (ref 0–0.9)
IRON SATN MFR SERPL: 23 % (ref 15–55)
IRON SERPL-MCNC: 74 UG/DL (ref 40–170)
LDLC SERPL CALC-MCNC: 136 MG/DL
LYMPHOCYTES # BLD AUTO: 3.81 K/UL (ref 1–4.8)
LYMPHOCYTES NFR BLD: 52 % (ref 22–41)
MCH RBC QN AUTO: 30.6 PG (ref 27–33)
MCHC RBC AUTO-ENTMCNC: 30.8 G/DL (ref 33.6–35)
MCV RBC AUTO: 99.5 FL (ref 81.4–97.8)
MONOCYTES # BLD AUTO: 0.78 K/UL (ref 0–0.85)
MONOCYTES NFR BLD AUTO: 10.7 % (ref 0–13.4)
NEUTROPHILS # BLD AUTO: 2.53 K/UL (ref 2–7.15)
NEUTROPHILS NFR BLD: 34.5 % (ref 44–72)
NRBC # BLD AUTO: 0 K/UL
NRBC BLD-RTO: 0 /100 WBC
PHOSPHATE SERPL-MCNC: 4.1 MG/DL (ref 2.5–4.5)
PLATELET # BLD AUTO: 287 K/UL (ref 164–446)
PMV BLD AUTO: 10.5 FL (ref 9–12.9)
POTASSIUM SERPL-SCNC: 5.1 MMOL/L (ref 3.6–5.5)
POTASSIUM SERPL-SCNC: 5.2 MMOL/L (ref 3.6–5.5)
PROT SERPL-MCNC: 7.2 G/DL (ref 6–8.2)
RBC # BLD AUTO: 3.85 M/UL (ref 4.2–5.4)
SODIUM SERPL-SCNC: 139 MMOL/L (ref 135–145)
SODIUM SERPL-SCNC: 140 MMOL/L (ref 135–145)
T4 FREE SERPL-MCNC: 0.92 NG/DL (ref 0.93–1.7)
TIBC SERPL-MCNC: 318 UG/DL (ref 250–450)
TRIGL SERPL-MCNC: 62 MG/DL (ref 0–149)
TSH SERPL DL<=0.005 MIU/L-ACNC: 6.01 UIU/ML (ref 0.38–5.33)
UIBC SERPL-MCNC: 244 UG/DL (ref 110–370)
WBC # BLD AUTO: 7.3 K/UL (ref 4.8–10.8)

## 2021-06-25 PROCEDURE — 85018 HEMOGLOBIN: CPT

## 2021-06-25 PROCEDURE — 85025 COMPLETE CBC W/AUTO DIFF WBC: CPT

## 2021-06-25 PROCEDURE — 80069 RENAL FUNCTION PANEL: CPT

## 2021-06-25 PROCEDURE — 36415 COLL VENOUS BLD VENIPUNCTURE: CPT

## 2021-06-25 PROCEDURE — 82728 ASSAY OF FERRITIN: CPT

## 2021-06-25 PROCEDURE — 84443 ASSAY THYROID STIM HORMONE: CPT

## 2021-06-25 PROCEDURE — 83550 IRON BINDING TEST: CPT

## 2021-06-25 PROCEDURE — 84439 ASSAY OF FREE THYROXINE: CPT

## 2021-06-25 PROCEDURE — 80053 COMPREHEN METABOLIC PANEL: CPT

## 2021-06-25 PROCEDURE — 83540 ASSAY OF IRON: CPT

## 2021-06-25 PROCEDURE — 85014 HEMATOCRIT: CPT

## 2021-06-25 PROCEDURE — 80061 LIPID PANEL: CPT

## 2021-06-25 NOTE — PROGRESS NOTES
Outcome: Scheduled in-home Comprehensive Health Assessment: June 29th in the afternoon.    Mbr scheduled in-home Comprehensive Health Assessment with [facility]. No further needs at this time.     Attempt # 2

## 2021-06-28 ENCOUNTER — PATIENT MESSAGE (OUTPATIENT)
Dept: MEDICAL GROUP | Facility: MEDICAL CENTER | Age: 86
End: 2021-06-28

## 2021-06-28 ENCOUNTER — TELEPHONE (OUTPATIENT)
Dept: MEDICAL GROUP | Facility: MEDICAL CENTER | Age: 86
End: 2021-06-28

## 2021-06-28 DIAGNOSIS — E03.8 SUBCLINICAL HYPOTHYROIDISM: ICD-10-CM

## 2021-06-28 NOTE — TELEPHONE ENCOUNTER
Left message regarding Pre Visit Planning for upcoming @.  Please transfer call to Ext [phone].    [facility]-[address]

## 2021-06-29 PROBLEM — F11.20 OPIOID TYPE DEPENDENCE, CONTINUOUS (HCC): Status: ACTIVE | Noted: 2021-06-29

## 2021-07-16 DIAGNOSIS — I10 ESSENTIAL HYPERTENSION: ICD-10-CM

## 2021-07-22 RX ORDER — AMLODIPINE BESYLATE 10 MG/1
TABLET ORAL
Qty: 90 TABLET | Refills: 0 | Status: SHIPPED | OUTPATIENT
Start: 2021-07-22 | End: 2021-12-16

## 2021-07-30 ENCOUNTER — OFFICE VISIT (OUTPATIENT)
Dept: MEDICAL GROUP | Facility: MEDICAL CENTER | Age: 86
End: 2021-07-30
Payer: MEDICARE

## 2021-07-30 VITALS
RESPIRATION RATE: 16 BRPM | TEMPERATURE: 97.4 F | HEART RATE: 64 BPM | BODY MASS INDEX: 24.18 KG/M2 | WEIGHT: 119.93 LBS | SYSTOLIC BLOOD PRESSURE: 122 MMHG | OXYGEN SATURATION: 97 % | HEIGHT: 59 IN | DIASTOLIC BLOOD PRESSURE: 68 MMHG

## 2021-07-30 DIAGNOSIS — E03.9 ACQUIRED HYPOTHYROIDISM: ICD-10-CM

## 2021-07-30 DIAGNOSIS — M54.42 CHRONIC BILATERAL LOW BACK PAIN WITH LEFT-SIDED SCIATICA: ICD-10-CM

## 2021-07-30 DIAGNOSIS — Z00.00 MEDICARE ANNUAL WELLNESS VISIT, SUBSEQUENT: ICD-10-CM

## 2021-07-30 DIAGNOSIS — K21.9 GASTROESOPHAGEAL REFLUX DISEASE WITHOUT ESOPHAGITIS: ICD-10-CM

## 2021-07-30 DIAGNOSIS — N18.30 STAGE 3 CHRONIC KIDNEY DISEASE, UNSPECIFIED WHETHER STAGE 3A OR 3B CKD: ICD-10-CM

## 2021-07-30 DIAGNOSIS — I10 ESSENTIAL HYPERTENSION: ICD-10-CM

## 2021-07-30 DIAGNOSIS — G89.29 CHRONIC BILATERAL LOW BACK PAIN WITH LEFT-SIDED SCIATICA: ICD-10-CM

## 2021-07-30 DIAGNOSIS — F32.1 CURRENT MODERATE EPISODE OF MAJOR DEPRESSIVE DISORDER WITHOUT PRIOR EPISODE (HCC): ICD-10-CM

## 2021-07-30 PROCEDURE — G0439 PPPS, SUBSEQ VISIT: HCPCS | Performed by: FAMILY MEDICINE

## 2021-07-30 RX ORDER — TIZANIDINE 2 MG/1
TABLET ORAL
COMMUNITY
Start: 2021-07-26 | End: 2021-11-10

## 2021-07-30 ASSESSMENT — ACTIVITIES OF DAILY LIVING (ADL): BATHING_REQUIRES_ASSISTANCE: 0

## 2021-07-30 ASSESSMENT — FIBROSIS 4 INDEX: FIB4 SCORE: 1.88

## 2021-07-30 ASSESSMENT — PATIENT HEALTH QUESTIONNAIRE - PHQ9: CLINICAL INTERPRETATION OF PHQ2 SCORE: 0

## 2021-07-30 ASSESSMENT — ENCOUNTER SYMPTOMS: GENERAL WELL-BEING: GOOD

## 2021-07-30 NOTE — PROGRESS NOTES
Chief Complaint   Patient presents with   • Annual Exam       HPI:  Anthony Lynn is a 89 y.o. here for Medicare Annual Wellness Visit     Medicare annual wellness visit, subsequent  Preventive measures and chronic medical issues reviewed.    Essential hypertension  Blood pressure has been controlled.  Patient has been on amlodipine 10 mg, and lisinopril 20 mg.  No side effects.      Current moderate episode of major depressive disorder without prior episode ([facility])  Patient's mood has been stable.  Denies suicidal ideation.  Currently on Wellbutrin and Cymbalta    Gastroesophageal reflux disease without esophagitis  Denies any epigastric pain or heartburn.  She t has been doing fine on famotidine    Stage 3 chronic kidney disease, unspecified whether stage 3a or 3b CKD ([facility])  Last GFR was 34, creatinine 1.4.  However patient has been asymptomatic    Chronic bilateral low back pain with left-sided sciatica  Patient has been on pain medication prescribed by pain management.    Acquired hypothyroidism  She has been tolerating Levothyroxine, no palpitation, no cold or heat intolerance, has been on levothyroxine 50 mcg daily    Declined Covid vaccine, shingles, and tetanus vaccines.    Patient Active Problem List    Diagnosis Date Noted   • Opioid type dependence, continuous ([facility]) 06/29/2021   • Sinus bradycardia 07/26/2018   • SUMA (acute kidney injury) ([facility]) 07/25/2018   • Hypothyroidism 07/25/2018   • Normocytic anemia 07/25/2018   • Other hammer toe (acquired) 08/06/2015   • LBBB (left bundle branch block) 11/07/2013   • CKD (chronic kidney disease) stage 3, GFR 30-59 ml/min ([facility]) 11/07/2013   • Fatigue 11/07/2013   • Glaucoma 11/30/2012   • Toe dislocation 05/17/2012   • Major depression 10/27/2011   • Tachyarrhythmia 04/28/2011   • HTN (hypertension) 04/28/2011   • GERD (gastroesophageal reflux disease) 04/28/2011   • Renal insufficiency 04/12/2011   • CATARACT    • Fibromyalgia    • IBS (irritable bowel  syndrome)    • Dyslipidemia    • Hives        Current Outpatient Medications   Medication Sig Dispense Refill   • tizanidine (ZANAFLEX) 2 MG tablet      • amLODIPine (NORVASC) 10 MG Tab TAKE ONE TABLET BY MOUTH EVERY DAY 90 tablet 0   • levothyroxine (SYNTHROID) 50 MCG Tab TAKE ONE TABLET BY MOUTH EVERY MORNING ON AN EMPTY STOMACH 90 tablet 3   • buPROPion (WELLBUTRIN XL) 150 MG XL tablet Take 1 Tab by mouth every morning. 90 Tab 2   • famotidine (PEPCID) 20 MG Tab Take 1 Tab by mouth every day. 90 Tab 3   • DULoxetine (CYMBALTA) 60 MG Cap DR Particles delayed-release capsule Take 1 Cap by mouth every day. 90 Cap 3   • lisinopril (PRINIVIL) 20 MG Tab Take 1 Tab by mouth 2 times a day. TAKE ONE TABLET BY MOUTH TWICE A  Tab 3   • tizanidine (ZANAFLEX) 4 MG capsule Take 1 Cap by mouth every 6 hours as needed. Indications: Muscle Spasticity 100 Cap 1   • sucralfate (CARAFATE) 1 GM Tab Take 1 Tab by mouth every day. 120 Tab 3   • lisinopril (PRINIVIL) 20 MG Tab TAKE ONE TABLET BY MOUTH TWICE A  Tab 1   • oxyCODONE-acetaminophen (PERCOCET) 5-325 MG Tab      • pregabalin (LYRICA) 25 MG Cap Take 25 mg by mouth 3 times a day.     • lisinopril (PRINIVIL) 10 MG Tab Take 20 mg by mouth every 12 hours.     • cholecalciferol (D3 MAXIMUM STRENGTH) 5000 UNIT Cap Take 5,000 Units by mouth every day.     • Misc Natural Products (ENERGY FOCUS) Tab Take 1 Tab by mouth every day.     • Multiple Vitamins-Minerals (MULTIVITAMIN WOMEN PO) Take 1 tablet by mouth every day.     • Phenylephrine-Guaifenesin (MUCUS RELIEF D)  MG Tab Take 1 tablet by mouth 1 time daily as needed (allergies).     • Caffeine (STAY AWAKE PO) Take 1 Tab by mouth 1 time daily as needed (to stay awake).     • Diclofenac Sodium 1 % Gel Apply 1 Dose to affected area(s) 4 times a day as needed (pain).     • brinzolamide (AZOPT) 1 % Suspension Place 1 Drop in both eyes 2 Times a Day.     • hydrocodone-acetaminophen (NORCO) 7.5-325 MG per tablet Take 1  Tab by mouth 3 times a day as needed for Severe Pain.     • Ferrous Sulfate (IRON) 325 (65 FE) MG TABS Take 325 mg by mouth every 48 hours.     • alprazolam (XANAX) 0.25 MG TABS Take 1 Tab by mouth at bedtime as needed for Sleep. [facility] fax # [phone] (Patient not taking: Reported on 11/5/2018) 30 Each 0   • GLUCOSAMINE PO Take 1,000 mg by mouth every day.     • latanoprost (XALATAN) 0.005 % SOLN Place 1 Drop in both eyes every evening.       No current facility-administered medications for this visit.          Current supplements as per medication list.     Allergies: Nkda [no known drug allergy]    Current social contact/activities:      She  reports that she has never smoked. She has never used smokeless tobacco. She reports that she does not drink alcohol and does not use drugs.  Counseling given: Not Answered      DPA/Advanced Directive:  Patient does not have an Advanced Directive.  A packet and workshop information was given on Advanced Directives.    ROS:    Gait: Uses no assistive device  Ostomy: No  Other tubes: No  Amputations: No  Chronic oxygen use: No  Last eye exam: 65/30/2021  Wears hearing aids: No   : Denies any urinary leakage during the last 6 months    Screening:    Depression Screening    Little interest or pleasure in doing things?  0 - not at all  Feeling down, depressed , or hopeless? 0 - not at all  Patient Health Questionnaire Score: 0     If depressive symptoms identified deferred to follow up visit unless specifically addressed in assessment and plan.    Interpretation of PHQ-9 Total Score   Score Severity   1-4 No Depression   5-9 Mild Depression   10-14 Moderate Depression   15-19 Moderately Severe Depression   20-27 Severe Depression    Screening for Cognitive Impairment    Three Minute Recall (captain, garden, picture) 3/3    Cole clock face with all 12 numbers and set the hands to show 5 past 8.       Cognitive concerns identified deferred for follow up unless specifically  addressed in assessment and plan.    Fall Risk Assessment    Has the patient had two or more falls in the last year or any fall with injury in the last year?  No    Safety Assessment    Throw rugs on floor.  No  Handrails on all stairs.  No  Good lighting in all hallways.  Yes  Difficulty hearing.  No  Patient counseled about all safety risks that were identified.    Functional Assessment ADLs    Are there any barriers preventing you from cooking for yourself or meeting nutritional needs?  No.    Are there any barriers preventing you from driving safely or obtaining transportation?  No.    Are there any barriers preventing you from using a telephone or calling for help?  No.    Are there any barriers preventing you from shopping?  No.    Are there any barriers preventing you from taking care of your own finances?  No.    Are there any barriers preventing you from managing your medications?  No.    Are there any barriers preventing you from showering, bathing or dressing yourself?  No.    Are you currently engaging in any exercise or physical activity?  No.     What is your perception of your health?  Good.      Health Maintenance Summary                COVID-19 Vaccine Overdue 9/17/1943     IMM HEP B VACCINE Overdue 9/17/1950     IMM DTaP/Tdap/Td Vaccine Overdue 9/17/1950     IMM ZOSTER VACCINES Overdue 9/17/1981     IMM INFLUENZA Next Due 9/1/2021     Annual Wellness Visit Next Due 6/30/2022      Done 6/29/2021 LOS:VT ANNUAL WELLNESS VISIT-INCLUDES PPPS SUBSEQUE*     Patient has more history with this topic...    BONE DENSITY Next Due 12/18/2024      Done 12/18/2019 DS-BONE DENSITY STUDY (DEXA)     Patient has more history with this topic...          Patient Care Team:  Maggi Olivarez M.D. as PCP - General (Geriatrics)  Magdalena Johnson M.D. as Consulting Physician (Nephrology)  Sarah Arriola as          Social History     Tobacco Use   • Smoking status: Never Smoker   • Smokeless  tobacco: Never Used   Substance Use Topics   • Alcohol use: No   • Drug use: No     Family History   Problem Relation Age of Onset   • Heart Disease Father    • Lung Disease Father      She  has a past medical history of Anxiety, Arthritis, CAD (coronary artery disease), CATARACT, Dental disorder, Dyslipidemia, Fibromyalgia, GERD (gastroesophageal reflux disease), Glaucoma, Headache(784.0), Hives, Hives, HTN, IBS (irritable bowel syndrome), Indigestion, Myocardial infarct (HCC), Pain (7/31/15), Palpitations, Psychiatric problem, Snoring, Unspecified disorder of thyroid, and Unspecified urinary incontinence. She also has no past medical history of CHF (congestive heart failure) ([facility]).   Past Surgical History:   Procedure Laterality Date   • TOE AMPUTATION Left 8/6/2015    Procedure: TOE AMPUTATION 2ND;  Surgeon: Fredi Nelson D.P.M.;  Location: SURGERY [facility];  Service:    • CATARACT EXTRACTION WITH IOL     • OTHER      cataracts removed       Exam:   There were no vitals taken for this visit. There is no height or weight on file to calculate BMI.    Hearing excellent.    Dentition good  Alert, oriented in no acute distress.  Eye contact is good, speech goal directed, affect calm    Assessment and Plan. The following treatment and monitoring plan is recommended:    89 y.o. female     1. Medicare annual wellness visit, subsequent  Preventive measures and chronic medical issues reviewed.  - Subsequent Annual Wellness Visit - Includes PPPS ()    2. Essential hypertension  Controlled.  Continue on amlodipine and lisinopril  - Subsequent Annual Wellness Visit - Includes PPPS ()    3. Current moderate episode of major depressive disorder without prior episode ([facility])  Mood is stable.  Continue on Wellbutrin and Cymbalta  - Subsequent Annual Wellness Visit - Includes PPPS ()    4. Gastroesophageal reflux disease without esophagitis  Patient has been doing fine on famotidine  - Subsequent Annual  Wellness Visit - Includes PPPS ()    5. Stage 3 chronic kidney disease, unspecified whether stage 3a or 3b CKD (HCC)  Last GFR was 34, creatinine 1.4  Recommend hydration and avoiding nephrotoxic medication  Continue monitor kidney function  - Subsequent Annual Wellness Visit - Includes PPPS ()    6. Chronic bilateral low back pain with left-sided sciatica  Patient has been on pain medication prescribed by pain management.  - Subsequent Annual Wellness Visit - Includes PPPS ()    7. Acquired hypothyroidism  He has been tolerating Levothyroxine, no palpitation, no cold or heat intolerance  Continue levothyroxine 50 mcg daily    Services suggested:  Health Care Screening: Age-appropriate preventive services recommended by USPTF and ACIP covered by Medicare were discussed today. Services ordered if indicated and agreed upon by the patient.  Referrals offered: Community-based lifestyle interventions to reduce health risks and promote self-management and wellness, fall prevention, nutrition, physical activity, tobacco-use cessation, weight loss, and mental health services as per orders if indicated.    Discussion today about general wellness and lifestyle habits:    · Prevent falls and reduce trip hazards; Cautioned about securing or removing rugs.  · Have a working fire alarm and carbon monoxide detector;   · Engage in regular physical activity and social activities     Follow-up: No follow-ups on file.

## 2021-08-30 RX ORDER — LISINOPRIL 20 MG/1
TABLET ORAL
Qty: 200 TABLET | Refills: 3 | Status: SHIPPED | OUTPATIENT
Start: 2021-08-30 | End: 2023-01-01 | Stop reason: SDUPTHER

## 2021-09-08 RX ORDER — DULOXETIN HYDROCHLORIDE 60 MG/1
CAPSULE, DELAYED RELEASE ORAL
Qty: 90 CAPSULE | Refills: 3 | Status: SHIPPED | OUTPATIENT
Start: 2021-09-08 | End: 2022-01-01 | Stop reason: SDUPTHER

## 2021-09-17 ENCOUNTER — TELEPHONE (OUTPATIENT)
Dept: MEDICAL GROUP | Facility: PHYSICIAN GROUP | Age: 86
End: 2021-09-17

## 2021-09-17 NOTE — PROGRESS NOTES
NEW PATIENT VISIT PRE-VISIT PLANNING    1.  E[employer] Patient is checked in Patient Demographics?Yes    2.  Immunizations were updated in Epic using Reconcile Outside Information activity? Yes         3.  Is this appointment scheduled as a Hospital Follow-Up? No    4.  Patient is due for the following Health Maintenance Topics:   Health Maintenance Due   Topic Date Due   • COVID-19 Vaccine (1) Never done   • IMM DTaP/Tdap/Td Vaccine (1 - Tdap) Never done   • IMM ZOSTER VACCINES (1 of 2) Never done   • IMM INFLUENZA (1) Never done       5.  Reviewed/Updated the following with patient:       •   Preferred Pharmacy? Yes       •   Preferred Lab? Yes       •   Preferred Communication? Yes       •   Allergies? Yes       •   Medications? YES. Was Abstract Encounter opened and chart updated? YES       •   Social History? No       •   Family History (document living status of immediate family members and if + hx of  cancer, diabetes, hypertension, hyperlipidemia, heart attack, stroke) No    6.  Updated Care Team?       •   DME Company (gait device, O2, CPAP, etc.) NO       •   Other Specialists (eye doctor, derm, GYN, cardiology, endo, etc): NO    7.  AHA (Puls[employer]) form printed for Provider? Yes

## 2021-09-20 ENCOUNTER — APPOINTMENT (OUTPATIENT)
Dept: MEDICAL GROUP | Facility: PHYSICIAN GROUP | Age: 86
End: 2021-09-20
Payer: MEDICARE

## 2021-09-21 ENCOUNTER — TELEPHONE (OUTPATIENT)
Dept: MEDICAL GROUP | Facility: PHYSICIAN GROUP | Age: 86
End: 2021-09-21

## 2021-09-23 ENCOUNTER — OFFICE VISIT (OUTPATIENT)
Dept: MEDICAL GROUP | Facility: PHYSICIAN GROUP | Age: 86
End: 2021-09-23
Payer: MEDICARE

## 2021-09-23 ENCOUNTER — TELEPHONE (OUTPATIENT)
Dept: MEDICAL GROUP | Facility: PHYSICIAN GROUP | Age: 86
End: 2021-09-23

## 2021-09-23 VITALS
SYSTOLIC BLOOD PRESSURE: 122 MMHG | OXYGEN SATURATION: 97 % | WEIGHT: 119 LBS | BODY MASS INDEX: 23.99 KG/M2 | HEART RATE: 63 BPM | DIASTOLIC BLOOD PRESSURE: 50 MMHG | RESPIRATION RATE: 15 BRPM | HEIGHT: 59 IN | TEMPERATURE: 97.8 F

## 2021-09-23 DIAGNOSIS — N18.30 STAGE 3 CHRONIC KIDNEY DISEASE, UNSPECIFIED WHETHER STAGE 3A OR 3B CKD: ICD-10-CM

## 2021-09-23 DIAGNOSIS — F33.42 RECURRENT MAJOR DEPRESSIVE DISORDER, IN FULL REMISSION (HCC): ICD-10-CM

## 2021-09-23 DIAGNOSIS — R41.3 MEMORY LOSS OF UNKNOWN CAUSE: ICD-10-CM

## 2021-09-23 DIAGNOSIS — F11.20 OPIOID TYPE DEPENDENCE, CONTINUOUS (HCC): ICD-10-CM

## 2021-09-23 PROBLEM — N17.9 AKI (ACUTE KIDNEY INJURY) (HCC): Status: RESOLVED | Noted: 2018-07-25 | Resolved: 2021-09-23

## 2021-09-23 PROCEDURE — 99215 OFFICE O/P EST HI 40 MIN: CPT | Performed by: FAMILY MEDICINE

## 2021-09-23 ASSESSMENT — ENCOUNTER SYMPTOMS
CHILLS: 0
WEAKNESS: 0
SEIZURES: 0
FEVER: 0
RESPIRATORY NEGATIVE: 1
HEADACHES: 1
CLAUDICATION: 0
BACK PAIN: 1
PALPITATIONS: 0
MEMORY LOSS: 1
DIZZINESS: 0
GASTROINTESTINAL NEGATIVE: 1
EYES NEGATIVE: 1

## 2021-09-23 ASSESSMENT — FIBROSIS 4 INDEX: FIB4 SCORE: 1.9

## 2021-09-23 NOTE — PROGRESS NOTES
Subjective:     CC:   Chief Complaint   Patient presents with   • Hip Pain     x3 weeks ago. Hydrocodone is somewhat helping       HISTORY OF THE PRESENT ILLNESS: Patient is a 89 y.o. female. This pleasant patient is here today to establish care and discuss multiple concerns, she would like to discuss her kidney function, and wondering why she needs to see pain specialist and nephrologist. She is also concerned with her short term memory, noticed it getting worse around 2 years ago and has continued to decline. Has tried focus factor, some herbals, and one other medication that's OTC.  Her prior PCP was Dr. Olivarez    CKD (chronic kidney disease) stage 3, GFR 30-59 ml/min ([facility])  Chronic, reports she see's nephrology Dr. Johnson but is wanting to not see specialist for her kidney function. Records requested. States she has been told it is due to taking Excedrin but also continues to take 1000 mg daily for pain control. June 2021 GFR 34, BUN 32, Creatinine 1.44, currently also taking Lisinopril. Will review records once obtained from Nephrology    Opioid type dependence, continuous ([facility])  Chronic, patient is unsure when she started going to pain management, states she is going to [facility] but they are no longer taking SCP. Pt stats the pain is mostly headaches but also has some joint pain. She is unsure where her pain is and states that she doesn't pay attention, just takes pain meds once it starts. Referral send to physiatry for pain management, I feel her pain could likely be managed without opiates. Pt will keep track of her pain and where it originates and report back next visit. Records requested from pain management.     Recurrent major depressive disorder, in full remission ([facility])  Patient declines feeling depressed, states that she is currently taking Cymbalta 60 mg for depression which is effective. Has had depression in past and tried Buproprion which was ineffective.       Current  "Outpatient Medications Ordered in Epic   Medication Sig Dispense Refill   • DULoxetine (CYMBALTA) 60 MG Cap DR Particles delayed-release capsule TAKE ONE CAPSULE BY MOUTH EVERY DAY 90 Capsule 3   • lisinopril (PRINIVIL) 20 MG Tab TAKE ONE TABLET BY MOUTH TWICE A  Tablet 3   • tizanidine (ZANAFLEX) 2 MG tablet      • amLODIPine (NORVASC) 10 MG Tab TAKE ONE TABLET BY MOUTH EVERY DAY 90 tablet 0   • levothyroxine (SYNTHROID) 50 MCG Tab TAKE ONE TABLET BY MOUTH EVERY MORNING ON AN EMPTY STOMACH 90 tablet 3   • oxyCODONE-acetaminophen (PERCOCET) 5-325 MG Tab      • cholecalciferol (D3 MAXIMUM STRENGTH) 5000 UNIT Cap Take 5,000 Units by mouth every day.     • Multiple Vitamins-Minerals (MULTIVITAMIN WOMEN PO) Take 1 tablet by mouth every day.       No current [facility]-ordered facility-administered medications on file.       Health Maintenance: Completed    Review of Systems   Constitutional: Negative for chills, fever and malaise/fatigue.   HENT: Positive for hearing loss and tinnitus.    Eyes: Negative.         Reports glaucoma   Respiratory: Negative.    Cardiovascular: Negative for chest pain, palpitations, claudication and leg swelling.   Gastrointestinal: Negative.    Genitourinary: Negative.    Musculoskeletal: Positive for back pain and joint pain.   Skin: Negative.    Neurological: Positive for headaches. Negative for dizziness, seizures and weakness.   Psychiatric/Behavioral: Positive for memory loss.         Objective:       Exam: /50 (BP Location: Right arm, Patient Position: Sitting, BP Cuff Size: Adult)   Pulse 63   Temp 36.6 °C (97.8 °F) (Temporal)   Resp 15   Ht 1.499 m (4' 11\")   Wt 54 kg (119 lb)   SpO2 97%  Body mass index is 24.04 kg/m².    Physical Exam  Vitals reviewed.   Constitutional:       Appearance: Normal appearance.   HENT:      Mouth/Throat:      Mouth: Mucous membranes are moist.      Pharynx: Oropharynx is clear.   Eyes:      Conjunctiva/sclera: Conjunctivae normal.      " Pupils: Pupils are equal, round, and reactive to light.   Cardiovascular:      Rate and Rhythm: Normal rate and regular rhythm.      Pulses: Normal pulses.      Heart sounds: Murmur heard.     Pulmonary:      Effort: Pulmonary effort is normal. No respiratory distress.      Breath sounds: Normal breath sounds. No stridor. No wheezing, rhonchi or rales.   Abdominal:      General: Abdomen is flat.   Musculoskeletal:      Right lower leg: No edema.      Left lower leg: No edema.   Skin:     General: Skin is warm and dry.      Coloration: Skin is not jaundiced or pale.   Neurological:      Mental Status: She is alert and oriented to person, place, and time.          A chaperone was offered to the patient during today's exam. Chaperone name:  present was present.      Assessment & Plan:   89 y.o. female with the following -    1. Stage 3 chronic kidney disease, unspecified whether stage 3a or 3b CKD (HCC)  - Comp Metabolic Panel; Future    2. Opioid type dependence, continuous (HCC)  - REFERRAL TO OUTPATIENT INTERVENTIONAL PAIN CLINIC    3. Memory loss of unknown cause  - TSH WITH REFLEX TO FT4; Future  - VITAMIN B12; Future  - CBC WITH DIFFERENTIAL; Future    4. Recurrent major depressive disorder, in full remission (HCC)       I spent a total of  minutes with record review, exam, communication with the patient, communication with other providers, and documentation of this encounter.    Return in about 19 days (around 10/12/2021), or please make appt with RN for MOCHA test and then f/u with me after.    Please note that this dictation was created using voice recognition software. I have made every reasonable attempt to correct obvious errors, but I expect that there are errors of grammar and possibly content that I did not discover before finalizing the note.    Annual Health Assessment Questions:    1.  Are you currently engaging in any exercise or physical activity? No    2.  How would you describe your mood or  emotional well-being today? fair    3.  Have you had any falls in the last year? No    4.  Have you noticed any problems with your balance or had difficulty walking? Yes    5.  In the last six months have you experienced any leakage of urine? No    6. DPA/Advanced Directive: Patient does not have an Advanced Directive.  A packet and workshop information was given on Advanced Directives.

## 2021-09-23 NOTE — ASSESSMENT & PLAN NOTE
Chronic, reports she see's nephrology Dr. Johnson but is wanting to not see specialist for her kidney function. Records requested. States she has been told it is due to taking Excedrin but also continues to take 1000 mg daily for pain control. June 2021 GFR 34, BUN 32, Creatinine 1.44, currently also taking Lisinopril. Will review records once obtained from Nephrology

## 2021-09-23 NOTE — LETTER
[facility]  [place]  [address]  [place] [address]  Fax: [phone]   Authorization for Release/Disclosure of   Protected Health Information   Name: ANTHONY HUFF : 1931 SSN: [id number]   Address: [facility] [address]  [place] [address] Phone:    [phone] (home) [phone] (work)   I authorize the entity listed below to release/disclose the PHI below to:   [facility]/[place] and [place]   Provider or Entity Name:  [facility]   Address   City, State, [facility]   Phone:      Fax:     Reason for request: continuity of care   Information to be released:    [  ] LAST COLONOSCOPY,  including any PATH REPORT and follow-up  [  ] LAST FIT/COLOGUARD RESULT [  ] LAST DEXA  [  ] LAST MAMMOGRAM  [  ] LAST PAP  [  ] LAST LABS [  ] RETINA EXAM REPORT  [  ] IMMUNIZATION RECORDS  [ XXX ] Release all info      [ XXX ] Check here and initial the line next to each item to release ALL health information INCLUDING  _____ Care and treatment for drug and / or alcohol abuse  _____ HIV testing, infection status, or AIDS  _____ Genetic Testing    DATES OF SERVICE OR TIME PERIOD TO BE DISCLOSED: _____________  I understand and acknowledge that:  * This Authorization may be revoked at any time by you in writing, except if your health information has already been used or disclosed.  * Your health information that will be used or disclosed as a result of you signing this authorization could be re-disclosed by the recipient. If this occurs, your re-disclosed health information may no longer be protected by State or Federal laws.  * You may refuse to sign this Authorization. Your refusal will not affect your ability to obtain treatment.  * This Authorization becomes effective upon signing and will  on (date) __________.      If no date is indicated, this Authorization will  one (1) year from the signature date.    Name: Anthony  Rosalinda Lynn    Signature:   Date:     9/23/2021       PLEASE FAX REQUESTED RECORDS BACK TO: [phone]

## 2021-09-23 NOTE — LETTER
[facility]  [place]  [address]  [place] [address]  Fax: [phone]   Authorization for Release/Disclosure of   Protected Health Information   Name: ANTHONY HUFF : 1931 SSN: [id number]   Address: [facility] [address]  [facility] [place] [address] Phone:    [phone] (home) [phone] (work)   I authorize the entity listed below to release/disclose the PHI below to:   [facility]/[place] and [place]   Provider or Entity Name:  Magdalena Johnson M.D   Address   City, State, [facility]   Phone:      Fax:     Reason for request: continuity of care   Information to be released:    [  ] LAST COLONOSCOPY,  including any PATH REPORT and follow-up  [  ] LAST FIT/COLOGUARD RESULT [  ] LAST DEXA  [  ] LAST MAMMOGRAM  [  ] LAST PAP  [  ] LAST LABS [  ] RETINA EXAM REPORT  [  ] IMMUNIZATION RECORDS  [ XXX ] Release all info      [ XXX ] Check here and initial the line next to each item to release ALL health information INCLUDING  _____ Care and treatment for drug and / or alcohol abuse  _____ HIV testing, infection status, or AIDS  _____ Genetic Testing    DATES OF SERVICE OR TIME PERIOD TO BE DISCLOSED: _____________  I understand and acknowledge that:  * This Authorization may be revoked at any time by you in writing, except if your health information has already been used or disclosed.  * Your health information that will be used or disclosed as a result of you signing this authorization could be re-disclosed by the recipient. If this occurs, your re-disclosed health information may no longer be protected by State or Federal laws.  * You may refuse to sign this Authorization. Your refusal will not affect your ability to obtain treatment.  * This Authorization becomes effective upon signing and will  on (date) __________.      If no date is indicated, this Authorization will  one (1) year from the signature date.    Name: Anthony Tellez  Leah    Signature:   Date:     9/23/2021       PLEASE FAX REQUESTED RECORDS BACK TO: [phone]

## 2021-09-23 NOTE — ASSESSMENT & PLAN NOTE
Patient declines feeling depressed, states that she is currently taking Cymbalta 60 mg for depression which is effective. Has had depression in past and tried Buproprion which was ineffective.

## 2021-09-23 NOTE — ASSESSMENT & PLAN NOTE
Chronic, patient is unsure when she started going to pain management, states she is going to [facility] but they are no longer taking SCP. Pt stats the pain is mostly headaches but also has some joint pain. She is unsure where her pain is and states that she doesn't pay attention, just takes pain meds once it starts. Referral send to physiatry for pain management, I feel her pain could likely be managed without opiates. Pt will keep track of her pain and where it originates and report back next visit. Records requested from pain management.

## 2021-09-23 NOTE — LETTER
[facility]  [place]  [address]  [place] [address]  Fax: [phone]   Authorization for Release/Disclosure of   Protected Health Information   Name: ANTHONY HUFF : 1931 SSN: [id number]   Address: [facility] [address]  [place] [address] Phone:    [phone] (home) [phone] (work)   I authorize the entity listed below to release/disclose the PHI below to:   [facility]/[place] and [place]   Provider or Entity Name:  [facility]   Address   City, State, [facility]   Phone:      Fax:     Reason for request: continuity of care   Information to be released:    [  ] LAST COLONOSCOPY,  including any PATH REPORT and follow-up  [  ] LAST FIT/COLOGUARD RESULT [  ] LAST DEXA  [  ] LAST MAMMOGRAM  [  ] LAST PAP  [  ] LAST LABS [  ] RETINA EXAM REPORT  [  ] IMMUNIZATION RECORDS  [ XXX ] Release all info      [ XXX ] Check here and initial the line next to each item to release ALL health information INCLUDING  _____ Care and treatment for drug and / or alcohol abuse  _____ HIV testing, infection status, or AIDS  _____ Genetic Testing    DATES OF SERVICE OR TIME PERIOD TO BE DISCLOSED: _____________  I understand and acknowledge that:  * This Authorization may be revoked at any time by you in writing, except if your health information has already been used or disclosed.  * Your health information that will be used or disclosed as a result of you signing this authorization could be re-disclosed by the recipient. If this occurs, your re-disclosed health information may no longer be protected by State or Federal laws.  * You may refuse to sign this Authorization. Your refusal will not affect your ability to obtain treatment.  * This Authorization becomes effective upon signing and will  on (date) __________.      If no date is indicated, this Authorization will  one (1) year from the signature date.    Name: Anthony Tellez  Leah    Signature:   Date:     9/23/2021       PLEASE FAX REQUESTED RECORDS BACK TO: [phone]

## 2021-09-24 NOTE — TELEPHONE ENCOUNTER
Called pt, she wants to re-schedule her appts from 10/11 to Thursday 10/14. JAYNA Ocampo at 1:20 and Colin at 2pm.   We will have to have Jeanine override the schedule and place pt in those slots though since [place] has it closed.    none

## 2021-10-04 ENCOUNTER — HOSPITAL ENCOUNTER (OUTPATIENT)
Dept: LAB | Facility: MEDICAL CENTER | Age: 86
End: 2021-10-04
Attending: FAMILY MEDICINE
Payer: MEDICARE

## 2021-10-04 DIAGNOSIS — R41.3 MEMORY LOSS OF UNKNOWN CAUSE: ICD-10-CM

## 2021-10-04 DIAGNOSIS — N18.30 STAGE 3 CHRONIC KIDNEY DISEASE, UNSPECIFIED WHETHER STAGE 3A OR 3B CKD: ICD-10-CM

## 2021-10-04 LAB
ALBUMIN SERPL BCP-MCNC: 4.1 G/DL (ref 3.2–4.9)
ALBUMIN/GLOB SERPL: 1.4 G/DL
ALP SERPL-CCNC: 62 U/L (ref 30–99)
ALT SERPL-CCNC: 9 U/L (ref 2–50)
ANION GAP SERPL CALC-SCNC: 11 MMOL/L (ref 7–16)
AST SERPL-CCNC: 15 U/L (ref 12–45)
BASOPHILS # BLD AUTO: 0.5 % (ref 0–1.8)
BASOPHILS # BLD: 0.03 K/UL (ref 0–0.12)
BILIRUB SERPL-MCNC: <0.2 MG/DL (ref 0.1–1.5)
BUN SERPL-MCNC: 36 MG/DL (ref 8–22)
CALCIUM SERPL-MCNC: 9.1 MG/DL (ref 8.5–10.5)
CHLORIDE SERPL-SCNC: 104 MMOL/L (ref 96–112)
CO2 SERPL-SCNC: 25 MMOL/L (ref 20–33)
CREAT SERPL-MCNC: 1.75 MG/DL (ref 0.5–1.4)
EOSINOPHIL # BLD AUTO: 0.07 K/UL (ref 0–0.51)
EOSINOPHIL NFR BLD: 1.1 % (ref 0–6.9)
ERYTHROCYTE [DISTWIDTH] IN BLOOD BY AUTOMATED COUNT: 53.8 FL (ref 35.9–50)
FASTING STATUS PATIENT QL REPORTED: NORMAL
GLOBULIN SER CALC-MCNC: 2.9 G/DL (ref 1.9–3.5)
GLUCOSE SERPL-MCNC: 97 MG/DL (ref 65–99)
HCT VFR BLD AUTO: 36.3 % (ref 37–47)
HGB BLD-MCNC: 11.6 G/DL (ref 12–16)
IMM GRANULOCYTES # BLD AUTO: 0.02 K/UL (ref 0–0.11)
IMM GRANULOCYTES NFR BLD AUTO: 0.3 % (ref 0–0.9)
LYMPHOCYTES # BLD AUTO: 2.42 K/UL (ref 1–4.8)
LYMPHOCYTES NFR BLD: 38.1 % (ref 22–41)
MCH RBC QN AUTO: 31.7 PG (ref 27–33)
MCHC RBC AUTO-ENTMCNC: 32 G/DL (ref 33.6–35)
MCV RBC AUTO: 99.2 FL (ref 81.4–97.8)
MONOCYTES # BLD AUTO: 0.63 K/UL (ref 0–0.85)
MONOCYTES NFR BLD AUTO: 9.9 % (ref 0–13.4)
NEUTROPHILS # BLD AUTO: 3.18 K/UL (ref 2–7.15)
NEUTROPHILS NFR BLD: 50.1 % (ref 44–72)
NRBC # BLD AUTO: 0 K/UL
NRBC BLD-RTO: 0 /100 WBC
PLATELET # BLD AUTO: 296 K/UL (ref 164–446)
PMV BLD AUTO: 10.6 FL (ref 9–12.9)
POTASSIUM SERPL-SCNC: 4.7 MMOL/L (ref 3.6–5.5)
PROT SERPL-MCNC: 7 G/DL (ref 6–8.2)
RBC # BLD AUTO: 3.66 M/UL (ref 4.2–5.4)
SODIUM SERPL-SCNC: 140 MMOL/L (ref 135–145)
TSH SERPL DL<=0.005 MIU/L-ACNC: 3.04 UIU/ML (ref 0.38–5.33)
VIT B12 SERPL-MCNC: 423 PG/ML (ref 211–911)
WBC # BLD AUTO: 6.4 K/UL (ref 4.8–10.8)

## 2021-10-04 PROCEDURE — 80053 COMPREHEN METABOLIC PANEL: CPT

## 2021-10-04 PROCEDURE — 82607 VITAMIN B-12: CPT

## 2021-10-04 PROCEDURE — 36415 COLL VENOUS BLD VENIPUNCTURE: CPT

## 2021-10-04 PROCEDURE — 84443 ASSAY THYROID STIM HORMONE: CPT

## 2021-10-04 PROCEDURE — 85025 COMPLETE CBC W/AUTO DIFF WBC: CPT

## 2021-10-05 ENCOUNTER — TELEPHONE (OUTPATIENT)
Dept: MEDICAL GROUP | Facility: PHYSICIAN GROUP | Age: 86
End: 2021-10-05

## 2021-10-05 NOTE — TELEPHONE ENCOUNTER
Called pt, explained GFR results. Questioned whether pt was interested in referral to nephrology? Pt was not interested. Pt reports pretty much all her fluid intake is water, reports a urination pattern of every 2-3 hours. Denies UTI symptoms. Agreeable to pushing more water intake. Has f/u appt on 10/14/21- advised provider may want to repeat labs at that visit. Pt had no further questions.

## 2021-10-05 NOTE — TELEPHONE ENCOUNTER
----- Message from Elin Lora D.O. sent at 10/5/2021 11:35 AM PDT -----  Worsened GFR, last note says she no longer wants to see nephrology. Needs good oral hydration. Can recheck on 10/14 in clinic. Make sure not UTI symptoms.

## 2021-10-12 ENCOUNTER — TELEPHONE (OUTPATIENT)
Dept: MEDICAL GROUP | Facility: PHYSICIAN GROUP | Age: 86
End: 2021-10-12

## 2021-10-12 NOTE — TELEPHONE ENCOUNTER
ESTABLISHED PATIENT PRE-VISIT PLANNING     Patient was NOT contacted to complete PVP.     Note: Patient will not be contacted if there is no indication to call.     1.  Reviewed notes from the last few office visits within the medical group: Yes    2.  If any orders were placed at last visit or intended to be done for this visit (i.e. 6 mos follow-up), do we have Results/Consult Notes?         •  Labs - Labs ordered, completed on 10/4/2021 and results are in chart.  Note: If patient appointment is for lab review and patient did not complete labs, check with provider if OK to reschedule patient until labs completed.       •  Imaging - Imaging was not ordered at last office visit.       •  Referrals - Referral ordered, patient has NOT been seen. Pt has upcoming appointment with Physiatry on 10/27/21.     3. Is this appointment scheduled as a Hospital Follow-Up? No    4.  Immunizations were updated in Epic using Reconcile Outside Information activity? Yes    5.  Patient is due for the following Health Maintenance Topics:   Health Maintenance Due   Topic Date Due   • COVID-19 Vaccine (1) Never done   • IMM DTaP/Tdap/Td Vaccine (1 - Tdap) Never done   • IMM ZOSTER VACCINES (1 of 2) Never done   • IMM INFLUENZA (1) Never done       6.  AHA (Pulse8) form printed for Provider? No, already completed

## 2021-10-14 ENCOUNTER — OFFICE VISIT (OUTPATIENT)
Dept: MEDICAL GROUP | Facility: PHYSICIAN GROUP | Age: 86
End: 2021-10-14
Payer: MEDICARE

## 2021-10-14 VITALS
OXYGEN SATURATION: 95 % | TEMPERATURE: 98 F | WEIGHT: 114 LBS | HEIGHT: 59 IN | HEART RATE: 75 BPM | RESPIRATION RATE: 20 BRPM | BODY MASS INDEX: 22.98 KG/M2 | DIASTOLIC BLOOD PRESSURE: 72 MMHG | SYSTOLIC BLOOD PRESSURE: 152 MMHG

## 2021-10-14 DIAGNOSIS — N18.32 STAGE 3B CHRONIC KIDNEY DISEASE: ICD-10-CM

## 2021-10-14 DIAGNOSIS — R10.84 GENERALIZED ABDOMINAL PAIN: ICD-10-CM

## 2021-10-14 LAB
APPEARANCE UR: CLEAR
BILIRUB UR STRIP-MCNC: NORMAL MG/DL
COLOR UR AUTO: YELLOW
GLUCOSE UR STRIP.AUTO-MCNC: NORMAL MG/DL
KETONES UR STRIP.AUTO-MCNC: NORMAL MG/DL
LEUKOCYTE ESTERASE UR QL STRIP.AUTO: NORMAL
NITRITE UR QL STRIP.AUTO: NORMAL
PH UR STRIP.AUTO: 5.5 [PH] (ref 5–8)
PROT UR QL STRIP: NORMAL MG/DL
RBC UR QL AUTO: NORMAL
SP GR UR STRIP.AUTO: 1.01
UROBILINOGEN UR STRIP-MCNC: NORMAL MG/DL

## 2021-10-14 PROCEDURE — 99215 OFFICE O/P EST HI 40 MIN: CPT | Performed by: FAMILY MEDICINE

## 2021-10-14 PROCEDURE — 36415 COLL VENOUS BLD VENIPUNCTURE: CPT | Performed by: FAMILY MEDICINE

## 2021-10-14 PROCEDURE — 99000 SPECIMEN HANDLING OFFICE-LAB: CPT | Performed by: FAMILY MEDICINE

## 2021-10-14 PROCEDURE — 81002 URINALYSIS NONAUTO W/O SCOPE: CPT | Performed by: FAMILY MEDICINE

## 2021-10-14 RX ORDER — HYDROCODONE BITARTRATE AND ACETAMINOPHEN 7.5; 325 MG/1; MG/1
1 TABLET ORAL EVERY 6 HOURS PRN
COMMUNITY
End: 2021-11-10 | Stop reason: DRUGHIGH

## 2021-10-14 ASSESSMENT — ENCOUNTER SYMPTOMS
DIZZINESS: 0
FEVER: 0
CONSTIPATION: 1
BLOOD IN STOOL: 0
FALLS: 1
DIARRHEA: 0
HEADACHES: 0
NAUSEA: 0
VOMITING: 0
BACK PAIN: 1
ABDOMINAL PAIN: 1
CHILLS: 0

## 2021-10-14 ASSESSMENT — FIBROSIS 4 INDEX: FIB4 SCORE: 1.52

## 2021-10-14 NOTE — PROGRESS NOTES
Jadiel Cognitive Assessment (MOCA) Version 7.1    Years of Education: 11    TOTAL SCORE: 23 /30    Visuospatial/Executive: 5/5  Trails Test  [x]  Copy Cube [x]  Clock:  Contour  [x]  Numbers [x]  Hands  [x]    Naming: 3/3  Lion  [x]  Rhino  [x]  Camel  [x]    Attention: 3/6  Digits forward  [x]  Digits backward [x]  Letter vigilance [x]  Serial 7 subtraction (answers) [] [] [] [] []   Serial 7 subtraction (sub score: 0/3)    Language: 3/3  Repetition [x] [x]  Fluency (13 F-words/min) [x] (check if >10)    Abstraction: 2/2    Delayed Recall 0/5   No cue:    Face [] Velvet [] Holiness [] Yuridia [] Red []  Category:  Face [] Velvet [] Holiness [x] Yuridia [] Red []  Multiple choice: Face [x] Velvet [x] Holiness [] Yuridia [] Red [x]    Orientation 6/6  Date  [x]  Month  [x]  Year  [x]   Day  [x]  Place  [x]  City  [x]

## 2021-10-14 NOTE — ASSESSMENT & PLAN NOTE
Chronic, reports she see's nephrology Dr. Johnson but is wanting to not see specialist for her kidney function. Per patient she states her nephrologist has told her to stop taking Excedrin but also continues to take 1000 mg daily and Ibuprofen as needed for pain control. June 2021 GFR 34, BUN 32, Creatinine 1.44, currently also taking Lisinopril. Still awaiting records from Nephrology. UA checked in clinic, no s/s of UTI or abnormalities in urine. Patient instructed to increase water intake and no longer take NSAID's for pain relief.   Current GFR 10/4/21:   GFR If African American >60 mL/min/1.73 m 2 33 Abnormal     GFR If Non African American >60 mL/min/1.73 m 2 27 Abnormal       Bun 8 - 22 mg/dL 36 High     Creatinine 0.50 - 1.40 mg/dL 1.75 High

## 2021-10-14 NOTE — ASSESSMENT & PLAN NOTE
She comes in today w c/o abdominal pain, she states it is not sharp or burning, states it is bloated and gassy feeling like something is in her stomach. No rebound tenderness or guarding on palpation, no masses palpated. Patient denies bloody stools, N/V/D, fever, or chills. She states she is constipated and has not eaten today but did take some Excedrin and nodoz pill earlier slightly before the onset. Discussed that both of those medications on an empty stomach can cause stomach pain, no longer take nodoz. Take her stool softener as she typically does, can add milk of mag if she is still having trouble with having BM and increase water intake. Discussed UC/ED precautions.

## 2021-10-14 NOTE — PROGRESS NOTES
Subjective:     CC:   Chief Complaint   Patient presents with   • Follow-Up   • Lab Results   • Hip Pain     started a couple months ago   • GI Problem     painful, started a couple weeks ago       HPI:   Anthony presents today with lab follow up on labs. Pt was also scheduled for a MOCHA today with RN and no showed. Discussed re-scheduling this at their convienience to check cognition as it is declining per patients .     CKD (chronic kidney disease) stage 3, GFR 30-59 ml/min ([facility])  Chronic, reports she see's nephrology Dr. Johnson but is wanting to not see specialist for her kidney function. Per patient she states her nephrologist has told her to stop taking Excedrin but also continues to take 1000 mg daily and Ibuprofen as needed for pain control. June 2021 GFR 34, BUN 32, Creatinine 1.44, currently also taking Lisinopril. Still awaiting records from Nephrology. UA checked in clinic, no s/s of UTI or abnormalities in urine. Patient instructed to increase water intake and no longer take NSAID's for pain relief.   Current GFR 10/4/21:   GFR If African American >60 mL/min/1.73 m 2 33 Abnormal     GFR If Non African American >60 mL/min/1.73 m 2 27 Abnormal       Bun 8 - 22 mg/dL 36 High     Creatinine 0.50 - 1.40 mg/dL 1.75 High          Generalized abdominal pain  She comes in today w c/o abdominal pain, she states it is not sharp or burning, states it is bloated and gassy feeling like something is in her stomach. No rebound tenderness or guarding on palpation, no masses palpated. Patient denies bloody stools, N/V/D, fever, or chills. She states she is constipated and has not eaten today but did take some Excedrin and nodoz pill earlier slightly before the onset. Discussed that both of those medications on an empty stomach can cause stomach pain, no longer take nodoz. Take her stool softener as she typically does, can add milk of mag if she is still having trouble with having BM and increase water intake.  "Discussed UC/ED precautions.            Current Outpatient Medications Ordered in Epic   Medication Sig Dispense Refill   • HYDROcodone-acetaminophen (NORCO) 7.5-325 MG per tablet Take 1 Tablet by mouth every 6 hours as needed.     • DULoxetine (CYMBALTA) 60 MG Cap DR Particles delayed-release capsule TAKE ONE CAPSULE BY MOUTH EVERY DAY 90 Capsule 3   • lisinopril (PRINIVIL) 20 MG Tab TAKE ONE TABLET BY MOUTH TWICE A  Tablet 3   • tizanidine (ZANAFLEX) 2 MG tablet      • amLODIPine (NORVASC) 10 MG Tab TAKE ONE TABLET BY MOUTH EVERY DAY 90 tablet 0   • levothyroxine (SYNTHROID) 50 MCG Tab TAKE ONE TABLET BY MOUTH EVERY MORNING ON AN EMPTY STOMACH 90 tablet 3   • cholecalciferol (D3 MAXIMUM STRENGTH) 5000 UNIT Cap Take 5,000 Units by mouth every day.     • Multiple Vitamins-Minerals (MULTIVITAMIN WOMEN PO) Take 1 tablet by mouth every day.     • oxyCODONE-acetaminophen (PERCOCET) 5-325 MG Tab  (Patient not taking: Reported on 10/14/2021)       No current [facility]-ordered facility-administered medications on file.       Health Maintenance: Completed    Review of Systems   Constitutional: Negative for chills, fever and malaise/fatigue.   Gastrointestinal: Positive for abdominal pain and constipation. Negative for blood in stool, diarrhea, nausea and vomiting.   Genitourinary: Negative for dysuria, frequency, hematuria and urgency.   Musculoskeletal: Positive for back pain and falls.        Reports 1 fall 2 days ago, hit her bottom on the floor.    Neurological: Negative for dizziness and headaches.         Objective:     Exam:  /72 (BP Location: Left arm, Patient Position: Sitting, BP Cuff Size: Adult)   Pulse 75   Temp 36.7 °C (98 °F) (Temporal)   Resp 20   Ht 1.499 m (4' 11\")   Wt 51.7 kg (114 lb)   SpO2 95%   BMI 23.03 kg/m²  Body mass index is 23.03 kg/m².    Physical Exam  Vitals reviewed.   Constitutional:       Appearance: Normal appearance.   HENT:      Mouth/Throat:      Mouth: Mucous membranes " are moist.      Pharynx: Oropharynx is clear.   Eyes:      Conjunctiva/sclera: Conjunctivae normal.      Pupils: Pupils are equal, round, and reactive to light.   Cardiovascular:      Rate and Rhythm: Normal rate and regular rhythm.      Pulses: Normal pulses.      Heart sounds: Normal heart sounds.   Pulmonary:      Effort: Pulmonary effort is normal.      Breath sounds: Normal breath sounds.   Abdominal:      General: There is no distension.      Palpations: There is no mass.      Tenderness: There is abdominal tenderness. There is no right CVA tenderness, left CVA tenderness, guarding or rebound.      Hernia: No hernia is present.   Skin:     General: Skin is warm and dry.      Coloration: Skin is not jaundiced.   Neurological:      Mental Status: She is alert and oriented to person, place, and time.      Comments:  reports short term memory loss   Psychiatric:         Mood and Affect: Mood normal.          A chaperone was offered to the patient during today's exam. Chaperone name:  was present.    Assessment & Plan:     89 y.o. female with the following -     1. Stage 3b chronic kidney disease (HCC)  Chronic, was previously seeing Dr. Johnson but no longer wants to see specialist. UTI r/o today in clinic, discussed increasing water intake, not to take caffeine supplements as they can dehydrate her further and she takes Excedrin daily already. Discussed not taking NSAID's which she reported taking today for breakthrough hip pain. Awaiting on record's from Nephrology  - POCT Urinalysis  - ESTIMATED GFR; Future    2. Generalized abdominal pain  Acute, onset 30 min prior to appointment. Started soon after taking Excedrin and Nodoz on an empty stomach, pt states she has not eaten today and also reports suspected constipation. Discussed taking stool softener, milk of mag, increase water intake and no longer take nodoz. Also reinforced taking medications with food to prevent upset stomach. Gave ED/UC  precautions.   Other orders  - HYDROcodone-acetaminophen (NORCO) 7.5-325 MG per tablet; Take 1 Tablet by mouth every 6 hours as needed.       I spent a total of 45 minutes with record review, exam, communication with the patient, communication with other providers, and documentation of this encounter.      Return in about 2 months (around 12/14/2021).    Please note that this dictation was created using voice recognition software. I have made every reasonable attempt to correct obvious errors, but I expect that there are errors of grammar and possibly content that I did not discover before finalizing the note.

## 2021-10-27 ENCOUNTER — TELEPHONE (OUTPATIENT)
Dept: MEDICAL GROUP | Facility: PHYSICIAN GROUP | Age: 86
End: 2021-10-27

## 2021-10-27 ENCOUNTER — APPOINTMENT (OUTPATIENT)
Dept: PHYSICAL MEDICINE AND REHAB | Facility: MEDICAL CENTER | Age: 86
End: 2021-10-27
Payer: MEDICARE

## 2021-10-27 DIAGNOSIS — N28.9 RENAL INSUFFICIENCY: ICD-10-CM

## 2021-10-27 NOTE — TELEPHONE ENCOUNTER
Patient had an Estimated GFR lab collected but never resulted on 10/14. Called the Lab and Elida stated an Estimated GFR draw needs to be collected with a CMP. Would you like the patient to come in for a redraw?   Please Advise

## 2021-10-27 NOTE — PROGRESS NOTES
"New patient note    Physiatry (physical medicine and  Rehabilitation), interventional spine and sports medicine    Date of Service: 10/27/2021    Chief complaint: No chief complaint on file.      HISTORY    HPI: Anthony Lynn 89 y.o. female who presents today for evaluation of      Location: ***  Quality: ***  Severity: ***  Duration: ***  Radiation: ***  Timing: *** {Worse in the morning}  Context, *** {worse after walking}  Modifying factors: *** {Improved with pain meds}  Associated signs/symptoms: ***       Medical records review:  I reviewed the note from the referring provider Belinda Shaw A.P.* dated ***.     Previous treatments:    Physical Therapy: {YES (DEF)/NO:83740::\"Yes\"}    Medications the patient is tried: {ckmedHPI:05902}    Previous interventions: none***    Previous surgeries to relieve the above pain:  none***      ROS: ***  Red Flags ROS:   Fever, Chills, Sweats: Denies  Involuntary Weight Loss: Denies  Bladder Incontinence: Denies  Bowel Incontinence: Denies  Saddle Anesthesia: Denies    All other systems reviewed and negative.       PMHx:   Past Medical History:   Diagnosis Date   • SUMA (acute kidney injury) ([facility]) 7/25/2018   • Anxiety    • Arthritis    • CAD (coronary artery disease)    • CATARACT      B/L   • Dental disorder     upper and lower dentures   • Dyslipidemia    • Fibromyalgia    • GERD (gastroesophageal reflux disease)    • Glaucoma    • Headache(784.0)    • Hives    • Hives    • HTN    • IBS (irritable bowel syndrome)    • Indigestion    • Myocardial infarct ([facility])     2005   • Pain 7/31/15    all over 3/10 (hx of fibromyalgia   • Palpitations    • Psychiatric problem     depression   • Snoring    • Toe dislocation 5/17/2012   • Unspecified disorder of thyroid     hypothroidism, no longer on meds   • Unspecified urinary incontinence        PSHx:   Past Surgical History:   Procedure Laterality Date   • TOE AMPUTATION Left 8/6/2015    Procedure: TOE AMPUTATION 2ND;  " Surgeon: Fredi Nelson D.P.M.;  Location: SURGERY [facility];  Service:    • CATARACT EXTRACTION WITH IOL     • OTHER      cataracts removed       Family history   Family History   Problem Relation Age of Onset   • Heart Disease Father    • Lung Disease Father          Medications:   Current Outpatient Medications   Medication   • HYDROcodone-acetaminophen (NORCO) 7.5-325 MG per tablet   • DULoxetine (CYMBALTA) 60 MG Cap DR Particles delayed-release capsule   • lisinopril (PRINIVIL) 20 MG Tab   • tizanidine (ZANAFLEX) 2 MG tablet   • amLODIPine (NORVASC) 10 MG Tab   • levothyroxine (SYNTHROID) 50 MCG Tab   • oxyCODONE-acetaminophen (PERCOCET) 5-325 MG Tab   • cholecalciferol (D3 MAXIMUM STRENGTH) 5000 UNIT Cap   • Multiple Vitamins-Minerals (MULTIVITAMIN WOMEN PO)     No current facility-administered medications for this visit.       Allergies:   Allergies   Allergen Reactions   • Nkda [No Known Drug Allergy]        Social Hx:   Social History     Socioeconomic History   • Marital status:      Spouse name: Not on file   • Number of children: Not on file   • Years of education: Not on file   • Highest education level: Not on file   Occupational History   • Not on file   Tobacco Use   • Smoking status: Never Smoker   • Smokeless tobacco: Never Used   Substance and Sexual Activity   • Alcohol use: No   • Drug use: No   • Sexual activity: Never     Partners: Female, Male   Other Topics Concern   • Not on file   Social History Narrative   • Not on file     Social Determinants of Health     Financial Resource Strain:    • Difficulty of Paying Living Expenses:    Food Insecurity:    • Worried About Running Out of Food in the Last Year:    • Ran Out of Food in the Last Year:    Transportation Needs:    • Lack of Transportation (Medical):    • Lack of Transportation (Non-Medical):    Physical Activity:    • Days of Exercise per Week:    • Minutes of Exercise per Session:    Stress:    • Feeling of Stress :  "   Social Connections:    • Frequency of Communication with Friends and Family:    • Frequency of Social Gatherings with Friends and Family:    • Attends Sabianism Services:    • Active Member of Clubs or Organizations:    • Attends Club or Organization Meetings:    • Marital Status:    Intimate Partner Violence:    • Fear of Current or Ex-Partner:    • Emotionally Abused:    • Physically Abused:    • Sexually Abused:          EXAMINATION     Physical Exam:   Vitals: There were no vitals taken for this visit.    Constitutional:   Body Habitus: There is no height or weight on file to calculate BMI.  Cooperation: Fully cooperates with exam  Appearance: Well-groomed, well-nourished, not disheveled ***    Eyes: No scleral icterus, no proptosis     ENT -no obvious auditory deficits, no obvious tongue lesions, tongue midline, no facial droop     Skin -no rashes or lesions noted     Respiratory-  breathing comfortable on room air, no audible wheezing    Cardiovascular- capillary refills less than 2 seconds. No lower extremity edema is noted.     Gastrointestinal - no obvious abdominal masses, No tenderness to palpation in the abdomen    Psychiatric- alert and oriented ×3. Normal affect.     Gait - normal gait, no use of ambulatory device, nonantalgic. {sswalkin}.     Musculoskeletal -   Cervical spine   Inspection: No deformities of the skin over the cervical spine. No rashes or lesions.    {ck rom:37550::\"full \"} A/P ROM in all directions, {w-w/o:5700}  pain      Spurling’s sign: {ck r/l positive:46295::\"negative bilaterally\"}    No*** signs of muscular atrophy in bilateral upper extremities     No tenderness to palpation of the cervical facets    Thoracic/Lumbar Spine/Sacral Spine/Hips   Inspection: No*** evidence of atrophy in bilateral lower extremities throughout     ROM: {ck rom:28701::\"full \"} AROM with flexion, extension, lateral flexion, and rotation bilaterally, {w-w/o:5700} pain     Palpation: " "  {ckttplumbar:20983::\"No tenderness to palpation in midline at T1-T12 levels. No tenderness to palpation in the left and right of the midline T1-L5\"}  palpation over SI joint: {ck r/l positive:20693::\"negative bilaterally\"}    palpation over buttock: {ck r/l positive:20693::\"negative bilaterally\"}    palpation in hip or over the greater trochanters: {ck r/l positive:20693::\"negative bilaterally\"}      Lumbar spine Special tests  Neuro tension  Straight leg test {ck r/l positive:20693::\"negative bilaterally\"}    Slump test {ck r/l positive:20693::\"negative bilaterally\"}      HIP  FAIR test {ck r/l positive:20693::\"negative bilaterally\"}    Range of motion in the hips is within normal limits in flexion, extension, abduction, internal rotation, external rotation.***    SI joint tests  Observation patient sits on one buttocks: Negative***  SI joint compression {ck r/l positive:20693::\"negative bilaterally\"}    SI joint distraction {ck r/l positive:20693::\"negative bilaterally\"}    Thigh thrust test {ck r/l positive:20693::\"negative bilaterally\"}    JASMIN test {ck r/l positive:20693::\"negative bilaterally\"}       Neuro       Key points for the international standards for neurological classification of spinal cord injury (ISNCSCI) to light touch.     Dermatome R L   C4 2*** 2   C5 2 2   C6 2 2   C7 2 2   C8 2 2   T1 2 2   T2 2 2   L2 2 2   L3 2 2   L4 2 2   L5 2 2   S1 2 2   S2 2 2           Motor Exam Upper Extremities   ? Myotome R L   Shoulder flexion C5 ***5 5   Elbow flexion C5 5 5   Wrist extension C6 5 5   Elbow extension C7 5 5   Finger flexion C8 5 5   Finger abduction T1 5 5         Motor Exam Lower Extremities    ? Myotome R L   Hip flexion L2 ***5 5   Knee extension L3 5 5   Ankle dorsiflexion L4 5 5   Toe extension L5 5 5   Ankle plantarflexion S1 5 5         Lujan’s sign {ck r/l positive:20693::\"negative bilaterally\"}   Babinski sign {ck r/l positive:20693::\"negative bilaterally\"}   Clonus of the ankle " "{ck r/l positive:20693::\"negative bilaterally\"}     Reflexes  ?  R L   Biceps  2+*** 2+   Brachioradialis  2+ 2+   Patella  2+ 2+   Achilles   2+ 2+       MEDICAL DECISION MAKING    Medical records review: see under HPI section.     DATA    Labs:   Lab Results   Component Value Date/Time    SODIUM 140 10/04/2021 01:31 PM    POTASSIUM 4.7 10/04/2021 01:31 PM    CHLORIDE 104 10/04/2021 01:31 PM    CO2 25 10/04/2021 01:31 PM    ANION 11.0 10/04/2021 01:31 PM    GLUCOSE 97 10/04/2021 01:31 PM    BUN 36 (H) 10/04/2021 01:31 PM    CREATININE 1.75 (H) 10/04/2021 01:31 PM    CREATININE 1.21 (H) 12/10/2010 02:40 PM    CALCIUM 9.1 10/04/2021 01:31 PM    ASTSGOT 15 10/04/2021 01:31 PM    ALTSGPT 9 10/04/2021 01:31 PM    TBILIRUBIN <0.2 10/04/2021 01:31 PM    ALBUMIN 4.1 10/04/2021 01:31 PM    TOTPROTEIN 7.0 10/04/2021 01:31 PM    GLOBULIN 2.9 10/04/2021 01:31 PM    AGRATIO 1.4 10/04/2021 01:31 PM   ]    Lab Results   Component Value Date/Time    PROTHROMBTM 14.1 07/24/2018 10:48 PM    INR 1.12 07/24/2018 10:48 PM        Lab Results   Component Value Date/Time    WBC 6.4 10/04/2021 01:31 PM    WBC 5.8 11/04/2010 01:16 PM    RBC 3.66 (L) 10/04/2021 01:31 PM    RBC 4.04 11/04/2010 01:16 PM    HEMOGLOBIN 11.6 (L) 10/04/2021 01:31 PM    HEMATOCRIT 36.3 (L) 10/04/2021 01:31 PM    MCV 99.2 (H) 10/04/2021 01:31 PM    MCV 96 11/04/2010 01:16 PM    MCH 31.7 10/04/2021 01:31 PM    MCH 31.2 11/04/2010 01:16 PM    MCHC 32.0 (L) 10/04/2021 01:31 PM    MPV 10.6 10/04/2021 01:31 PM    NEUTSPOLYS 50.10 10/04/2021 01:31 PM    LYMPHOCYTES 38.10 10/04/2021 01:31 PM    MONOCYTES 9.90 10/04/2021 01:31 PM    EOSINOPHILS 1.10 10/04/2021 01:31 PM    BASOPHILS 0.50 10/04/2021 01:31 PM        Lab Results   Component Value Date/Time    HBA1C 5.8 (H) 07/25/2018 02:48 AM        Imaging: I personally reviewed following images, these are my reads  ***    IMAGING radiology reads. I reviewed the following radiology reads ***    Results for orders placed during " the hospital encounter of 04/02/12    MR-BRAIN-WITH & W/O    Impression  1. There are a few scattered punctate areas of of increased T2 signal intensity in the periventricular white matter and feli consistent with areas of ischemia, demyelination, or gliosis.    2. Age-related cortical atrophy.    3.  No evidence of cerebellar pontine angle mass..    Results for orders placed during the hospital encounter of 03/17/17    MR-BRAIN-W/O    Impression  1.  Mild to moderate generalized atrophy in a nonspecific pattern  2.  Moderate white matter changes  3.  Trace RIGHT mastoid effusion                 Results for orders placed during the hospital encounter of 03/22/07    MR-LUMBAR SPINE-W/O    Impression  IMPRESSION:    DEGENERATIVE CHANGES MOST PROMINENT AT THE L3-4 AND L4-5 LEVELS AS  DESCRIBED ABOVE.    [email or web address]      Read By DRAKE MUÑIZ MD on Mar 22 2007  3:05PM  : LVD Transcription Date: Mar 22 2007  9:48PM  THIS DOCUMENT HAS BEEN ELECTRONICALLY SIGNED BY: DRAKE MUÑIZ MD on Mar  23 2007  8:15AM        Results for orders placed during the hospital encounter of 03/22/07    MR-LUMBAR SPINE-W/O    Impression  IMPRESSION:    DEGENERATIVE CHANGES MOST PROMINENT AT THE L3-4 AND L4-5 LEVELS AS  DESCRIBED ABOVE.    [email or web address]      Read By DRAKE MUÑIZ MD on Mar 22 2007  3:05PM  : LVD Transcription Date: Mar 22 2007  9:48PM  THIS DOCUMENT HAS BEEN ELECTRONICALLY SIGNED BY: DRAKE MUÑIZ MD on Mar  23 2007  8:15AM                                          Results for orders placed during the hospital encounter of 11/03/10    DX-CHEST-2 VIEWS      Results for orders placed during the hospital encounter of 03/07/07    DX-FEMUR    Impression  IMPRESSION:    NO EVIDENCE OF FRACTURE OR DISLOCATION.     Results for orders placed in visit on 05/17/12    DX-FOOT-COMPLETE 3+    Impression  1. Status post reduction of dislocation.    2. Possible subtle nondisplaced fracture of the proximal end of the  proximal phalanx of the second digit.        Results for orders placed in visit on 09/03/14    DX-HIP-COMPLETE - UNILATERAL 2+    Impression  1.  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  Additionally, bilateral sacroiliac joint osteoarthritis and lumbar spines spine facet arthropathy/disc space narrowing   Results for orders placed during the hospital encounter of 03/07/07    DX-HIPS-COMPLETE - BILATERAL 3+    Impression  IMPRESSION:    NO EVIDENCE OF FRACTURE OR DEGENERATIVE CHANGE AT THE HIP ARTICULATIONS.    [facility]      Read By EDDA WHITE MD on Mar  7 2007 10:43AM  : LUIS FELIPE Transcription Date: Mar  7 2007  7:16PM  THIS DOCUMENT HAS BEEN ELECTRONICALLY SIGNED BY: EDDA WHITE MD on Mar  7 2007  7:43PM        Results for orders placed during the hospital encounter of 03/07/07    DX-LUMBAR SPINE-2 OR 3 VIEWS    Impression  IMPRESSION:    DEGENERATIVE DISC DISEASE SEEN AT THE L4-5 AND L5-S1 LEVELS.                         Diagnosis ***  {No diagnosis found. (Refresh or delete this SmartLink)}        ASSESSMENT:  Anthony Lynn 89 y.o. female ***     There are no diagnoses linked to this encounter.      {Outside records requested:  The patient signed outside records request form for her outside records including outside images.}      Follow-up: No follow-ups on file.      Total time: *** minutes face-to-face time. I spent greater than 50% of the time for patient care and coordination on this date, including with the patient as per assessment and plan above.         Please note that this dictation was created using voice recognition software. I have made every reasonable attempt to correct obvious errors but there may be errors of grammar and content that I may have overlooked prior to finalization of this note.      Narendra Su MD  Physical Medicine and Rehabilitation  Interventional Spine and Sports Physiatry  [facility]           CC Belinda Shaw A.P.* ***.

## 2021-11-09 ENCOUNTER — TELEPHONE (OUTPATIENT)
Dept: MEDICAL GROUP | Facility: PHYSICIAN GROUP | Age: 86
End: 2021-11-09

## 2021-11-09 NOTE — TELEPHONE ENCOUNTER
ESTABLISHED PATIENT PRE-VISIT PLANNING     Patient was NOT contacted to complete PVP.     Note: Patient will not be contacted if there is no indication to call.     1.  Reviewed notes from the last few office visits within the medical group: Yes    2.  If any orders were placed at last visit or intended to be done for this visit (i.e. 6 mos follow-up), do we have Results/Consult Notes?         •  Labs - Labs ordered, completed on 10/14/21 and results are in chart.  Note: If patient appointment is for lab review and patient did not complete labs, check with provider if OK to reschedule patient until labs completed.       •  Imaging - Imaging was not ordered at last office visit.       •  Referrals - No referrals were ordered at last office visit.    3. Is this appointment scheduled as a Hospital Follow-Up? No    4.  Immunizations were updated in Epic using Reconcile Outside Information activity? Yes    5.  Patient is due for the following Health Maintenance Topics:   Health Maintenance Due   Topic Date Due   • IMM INFLUENZA (1) Never done     6.  AHA (Pulse8) form printed for Provider? No, already completed

## 2021-11-10 ENCOUNTER — OFFICE VISIT (OUTPATIENT)
Dept: MEDICAL GROUP | Facility: PHYSICIAN GROUP | Age: 86
End: 2021-11-10
Payer: MEDICARE

## 2021-11-10 ENCOUNTER — HOSPITAL ENCOUNTER (OUTPATIENT)
Facility: MEDICAL CENTER | Age: 86
End: 2021-11-10
Attending: FAMILY MEDICINE
Payer: MEDICARE

## 2021-11-10 VITALS
RESPIRATION RATE: 14 BRPM | DIASTOLIC BLOOD PRESSURE: 54 MMHG | OXYGEN SATURATION: 96 % | TEMPERATURE: 98.2 F | SYSTOLIC BLOOD PRESSURE: 118 MMHG | WEIGHT: 118 LBS | HEART RATE: 65 BPM | HEIGHT: 59 IN | BODY MASS INDEX: 23.79 KG/M2

## 2021-11-10 DIAGNOSIS — N18.32 STAGE 3B CHRONIC KIDNEY DISEASE: ICD-10-CM

## 2021-11-10 DIAGNOSIS — M25.552 LEFT HIP PAIN: ICD-10-CM

## 2021-11-10 DIAGNOSIS — N28.9 RENAL INSUFFICIENCY: ICD-10-CM

## 2021-11-10 PROCEDURE — 80053 COMPREHEN METABOLIC PANEL: CPT

## 2021-11-10 PROCEDURE — 99215 OFFICE O/P EST HI 40 MIN: CPT | Performed by: FAMILY MEDICINE

## 2021-11-10 PROCEDURE — 99000 SPECIMEN HANDLING OFFICE-LAB: CPT | Performed by: FAMILY MEDICINE

## 2021-11-10 PROCEDURE — 36415 COLL VENOUS BLD VENIPUNCTURE: CPT | Performed by: FAMILY MEDICINE

## 2021-11-10 RX ORDER — TIZANIDINE HYDROCHLORIDE 2 MG/1
CAPSULE, GELATIN COATED ORAL
Qty: 30 CAPSULE | Refills: 0 | Status: SHIPPED | OUTPATIENT
Start: 2021-11-10 | End: 2021-11-30 | Stop reason: SDUPTHER

## 2021-11-10 RX ORDER — NALOXONE HYDROCHLORIDE 4 MG/.1ML
SPRAY NASAL
Qty: 1 EACH | Refills: 0 | Status: SHIPPED | OUTPATIENT
Start: 2021-11-10 | End: 2023-01-01

## 2021-11-10 RX ORDER — HYDROCODONE BITARTRATE AND ACETAMINOPHEN 10; 325 MG/1; MG/1
TABLET ORAL
Qty: 60 TABLET | Refills: 0 | Status: SHIPPED | OUTPATIENT
Start: 2021-11-10 | End: 2021-11-11 | Stop reason: SDUPTHER

## 2021-11-10 ASSESSMENT — PATIENT HEALTH QUESTIONNAIRE - PHQ9
6. FEELING BAD ABOUT YOURSELF - OR THAT YOU ARE A FAILURE OR HAVE LET YOURSELF OR YOUR FAMILY DOWN: NOT AL ALL
7. TROUBLE CONCENTRATING ON THINGS, SUCH AS READING THE NEWSPAPER OR WATCHING TELEVISION: NOT AT ALL
1. LITTLE INTEREST OR PLEASURE IN DOING THINGS: SEVERAL DAYS
3. TROUBLE FALLING OR STAYING ASLEEP OR SLEEPING TOO MUCH: NOT AT ALL
5. POOR APPETITE OR OVEREATING: NOT AT ALL
4. FEELING TIRED OR HAVING LITTLE ENERGY: MORE THAN HALF THE DAYS
8. MOVING OR SPEAKING SO SLOWLY THAT OTHER PEOPLE COULD HAVE NOTICED. OR THE OPPOSITE, BEING SO FIGETY OR RESTLESS THAT YOU HAVE BEEN MOVING AROUND A LOT MORE THAN USUAL: NOT AT ALL
2. FEELING DOWN, DEPRESSED, IRRITABLE, OR HOPELESS: NOT AT ALL
SUM OF ALL RESPONSES TO PHQ9 QUESTIONS 1 AND 2: 1
SUM OF ALL RESPONSES TO PHQ QUESTIONS 1-9: 3
9. THOUGHTS THAT YOU WOULD BE BETTER OFF DEAD, OR OF HURTING YOURSELF: NOT AT ALL

## 2021-11-10 ASSESSMENT — FIBROSIS 4 INDEX: FIB4 SCORE: 1.52

## 2021-11-10 NOTE — PROGRESS NOTES
"Subjective:     CC:   Chief Complaint   Patient presents with   • Pain     rx/ , apt 11/17/2021        HPI:   Anthony presents today with pain management scripts needing refilled. She was at [facility], her insurance no longer covers her to go there. Physiatry referral placed, she had to cancel due to not feeling well, she is out of her Norco and is taking 3 Asprin 250 mg and 3 250 Tylenol a day. She has declined kidney function, discussed stop taking ASA, only take [place]. She states the 7/325's were not strong enough and she was also taking ASA with them. Discussed that I will give her a higher dose of Norco but discussed with  taking ASA out of pill regimen. Will go to pain management for further work up and discuss pain options on 11/17/21.  Patient understands this prescription is a controlled substance which is potentially habit-forming and its use is regulated by the [place]. We also discussed the new \"black box\" warning regarding the lethal combination of opioids and benzodiazepines. Refills are subject to terms of a controlled substance agreement and patient has an updated one on file. Most recent UDS is appropriate. Any refill requires an office visit. Narcotics have may adverse effects and the risks of addiction, accidental overdose and death were emphasized. Provided prescriptions for the next 30 days.       Left hip pain  Chronic ongoing problem over the last 3 months, however patient is a poor historian and  answers for her most of the time and she is [place]. Denies falls or trauma to area. She overall has decreased strength in bilateral legs. ROM intact, negative straight leg test. Denies saddle anesthesia, numbness or tingling, or radiating into lower leg. Denies fever, chills, or myalgias beyond her baseline. Currently taking 3 ASA and 3 Tylenol daily since she has been out of her Norco that was being given by pain management. Discussed refills today for pain and to " "stop taking the aspirin and only take tylenol for breakthrough pain. Given her chronic pain and decreased GFR we will refill her Norco, and she will f/u with physiatry in a week for further workup and management of pain.       Current Outpatient Medications Ordered in Epic   Medication Sig Dispense Refill   • tizanidine (ZANAFLEX) 2 MG capsule Take one tablet by mouth at bedtime as needed. 30 Capsule 0   • HYDROcodone/acetaminophen (NORCO)  MG Tab Take one tablet by mouth twice a day as needed for pain 60 Tablet 0   • Naloxone (NARCAN) 4 MG/0.1ML Liquid One spray in one nostril for overdose and call 911. 1 Each 0   • DULoxetine (CYMBALTA) 60 MG Cap DR Particles delayed-release capsule TAKE ONE CAPSULE BY MOUTH EVERY DAY 90 Capsule 3   • lisinopril (PRINIVIL) 20 MG Tab TAKE ONE TABLET BY MOUTH TWICE A  Tablet 3   • amLODIPine (NORVASC) 10 MG Tab TAKE ONE TABLET BY MOUTH EVERY DAY 90 tablet 0   • levothyroxine (SYNTHROID) 50 MCG Tab TAKE ONE TABLET BY MOUTH EVERY MORNING ON AN EMPTY STOMACH 90 tablet 3   • cholecalciferol (D3 MAXIMUM STRENGTH) 5000 UNIT Cap Take 5,000 Units by mouth every day.     • Multiple Vitamins-Minerals (MULTIVITAMIN WOMEN PO) Take 1 tablet by mouth every day.       No current [facility]-ordered facility-administered medications on file.       Health Maintenance: Completed        Objective:     Exam:  /54 (BP Location: Right arm, Patient Position: Sitting, BP Cuff Size: Adult)   Pulse 65   Temp 36.8 °C (98.2 °F) (Temporal)   Resp 14   Ht 1.499 m (4' 11\")   Wt 53.5 kg (118 lb)   SpO2 96%   BMI 23.83 kg/m²  Body mass index is 23.83 kg/m².    Physical Exam  Vitals reviewed. Exam conducted with a chaperone present.   Constitutional:       General: She is not in acute distress.     Appearance: Normal appearance. She is not ill-appearing.   HENT:      Mouth/Throat:      Mouth: Mucous membranes are moist.      Pharynx: Oropharynx is clear.   Eyes:      Conjunctiva/sclera: " Conjunctivae normal.      Pupils: Pupils are equal, round, and reactive to light.   Cardiovascular:      Rate and Rhythm: Normal rate and regular rhythm.      Pulses: Normal pulses.      Heart sounds: Normal heart sounds. No murmur heard.      Pulmonary:      Effort: Pulmonary effort is normal. No respiratory distress.      Breath sounds: Normal breath sounds.   Abdominal:      General: Abdomen is flat. There is no distension.      Palpations: Abdomen is soft.      Tenderness: There is no abdominal tenderness.   Musculoskeletal:      Right hip: No deformity, lacerations, tenderness, bony tenderness or crepitus. Normal range of motion. Decreased strength.      Left hip: Tenderness present. No deformity, lacerations, bony tenderness or crepitus. Normal range of motion. Decreased strength.      Right lower leg: No edema.      Left lower leg: No edema.   Skin:     General: Skin is warm and dry.   Neurological:      Mental Status: She is alert. Mental status is at baseline.   Psychiatric:         Mood and Affect: Mood normal.         Behavior: Behavior normal.          A chaperone was offered to the patient during today's exam. Chaperone name:  was present.        Assessment & Plan:     89 y.o. female with the following -     1. Left hip pain  Chronic, f/u with physiatry in 7 days. Cont Norco and Tizanidine as needed. Stop ASA/Tylenol/Caffeine tablet. Increase water intake. Check GFR today in clinic. Opiate consent signed in clinic, low risk for dependence. PDMP reviewed. ED precautions given. F/u in 1 month.   - HYDROcodone/acetaminophen (NORCO)  MG Tab; Take one tablet by mouth twice a day as needed for pain  Dispense: 60 Tablet; Refill: 0  - Controlled Substance Treatment Agreement    Other orders  - tizanidine (ZANAFLEX) 2 MG capsule; Take one tablet by mouth at bedtime as needed.  Dispense: 30 Capsule; Refill: 0  - Consent for Opiate Prescription  - Naloxone (NARCAN) 4 MG/0.1ML Liquid; One spray in one  nostril for overdose and call 911.  Dispense: 1 Each; Refill: 0       I spent a total of 40 minutes with record review, exam, communication with the patient, communication with other providers, and documentation of this encounter.      Return in about 5 weeks (around 12/14/2021), or if symptoms worsen or fail to improve.    Please note that this dictation was created using voice recognition software. I have made every reasonable attempt to correct obvious errors, but I expect that there are errors of grammar and possibly content that I did not discover before finalizing the note.

## 2021-11-10 NOTE — ASSESSMENT & PLAN NOTE
Chronic ongoing problem over the last 3 months, however patient is a poor historian and  answers for her most of the time and she is [place]. Denies falls or trauma to area. She overall has decreased strength in bilateral legs. ROM intact, negative straight leg test. Denies saddle anesthesia, numbness or tingling, or radiating into lower leg. Denies fever, chills, or myalgias beyond her baseline. Currently taking 3 ASA and 3 Tylenol daily since she has been out of her Norco that was being given by pain management. Discussed refills today for pain and to stop taking the aspirin and only take tylenol for breakthrough pain. Given her chronic pain and decreased GFR we will refill her Norco, and she will f/u with physiatry in a week for further workup and management of pain.

## 2021-11-10 NOTE — PROGRESS NOTES
Patient arrived for blood draw. Patient reports fasting for at least 8-10 hours.   Verified patient's name/date-of-birth and reason for visit before procedure was started. Patient's RAC cleansed. Venipuncture attempts 1. Blood draw completed on patient's RAC. Applied pressure afterwards and placed [place] on site of venipuncture with directions for patient to remove within the next hour. Patient tolerated procedure well, no adverse reactions. Patient ambulated safely upon leaving clinic.   Completed labels were placed on blood samples in draw room with patient present. Appropriate blood samples were centrifuged. Blood samples were then placed in locked lab box for  pick-up.

## 2021-11-11 ENCOUNTER — TELEPHONE (OUTPATIENT)
Dept: MEDICAL GROUP | Facility: PHYSICIAN GROUP | Age: 86
End: 2021-11-11

## 2021-11-11 DIAGNOSIS — M25.552 LEFT HIP PAIN: ICD-10-CM

## 2021-11-11 LAB
ALBUMIN SERPL BCP-MCNC: 4.3 G/DL (ref 3.2–4.9)
ALBUMIN/GLOB SERPL: 1.7 G/DL
ALP SERPL-CCNC: 71 U/L (ref 30–99)
ALT SERPL-CCNC: 9 U/L (ref 2–50)
ANION GAP SERPL CALC-SCNC: 9 MMOL/L (ref 7–16)
AST SERPL-CCNC: 13 U/L (ref 12–45)
BILIRUB SERPL-MCNC: <0.2 MG/DL (ref 0.1–1.5)
BUN SERPL-MCNC: 34 MG/DL (ref 8–22)
CALCIUM SERPL-MCNC: 9 MG/DL (ref 8.5–10.5)
CHLORIDE SERPL-SCNC: 107 MMOL/L (ref 96–112)
CO2 SERPL-SCNC: 23 MMOL/L (ref 20–33)
CREAT SERPL-MCNC: 1.68 MG/DL (ref 0.5–1.4)
GLOBULIN SER CALC-MCNC: 2.5 G/DL (ref 1.9–3.5)
GLUCOSE SERPL-MCNC: 111 MG/DL (ref 65–99)
POTASSIUM SERPL-SCNC: 5.4 MMOL/L (ref 3.6–5.5)
PROT SERPL-MCNC: 6.8 G/DL (ref 6–8.2)
SODIUM SERPL-SCNC: 139 MMOL/L (ref 135–145)

## 2021-11-11 RX ORDER — HYDROCODONE BITARTRATE AND ACETAMINOPHEN 10; 325 MG/1; MG/1
TABLET ORAL
Qty: 60 TABLET | Refills: 0 | Status: SHIPPED
Start: 2021-11-11 | End: 2021-12-08

## 2021-11-11 NOTE — TELEPHONE ENCOUNTER
Pharmacy called and left a message about needing day supply on rx sent over yesterday. New rx can be sent over with day supply or call in a day supply. Please Advise.

## 2021-11-17 ENCOUNTER — OFFICE VISIT (OUTPATIENT)
Dept: PHYSICAL MEDICINE AND REHAB | Facility: MEDICAL CENTER | Age: 86
End: 2021-11-17
Payer: MEDICARE

## 2021-11-17 VITALS
DIASTOLIC BLOOD PRESSURE: 64 MMHG | HEIGHT: 59 IN | HEART RATE: 68 BPM | BODY MASS INDEX: 24.49 KG/M2 | SYSTOLIC BLOOD PRESSURE: 130 MMHG | OXYGEN SATURATION: 96 % | TEMPERATURE: 97.2 F | WEIGHT: 121.47 LBS

## 2021-11-17 DIAGNOSIS — M85.89 OSTEOPENIA OF MULTIPLE SITES: ICD-10-CM

## 2021-11-17 DIAGNOSIS — M25.552 HIP PAIN, BILATERAL: ICD-10-CM

## 2021-11-17 DIAGNOSIS — F11.90 CHRONIC, CONTINUOUS USE OF OPIOIDS: ICD-10-CM

## 2021-11-17 DIAGNOSIS — M25.551 HIP PAIN, BILATERAL: ICD-10-CM

## 2021-11-17 DIAGNOSIS — M54.50 LUMBOSACRAL PAIN: ICD-10-CM

## 2021-11-17 DIAGNOSIS — N18.4 CKD (CHRONIC KIDNEY DISEASE) STAGE 4, GFR 15-29 ML/MIN (HCC): ICD-10-CM

## 2021-11-17 PROCEDURE — 99204 OFFICE O/P NEW MOD 45 MIN: CPT | Performed by: PHYSICAL MEDICINE & REHABILITATION

## 2021-11-17 ASSESSMENT — FIBROSIS 4 INDEX: FIB4 SCORE: 1.32

## 2021-11-17 ASSESSMENT — PAIN SCALES - GENERAL: PAINLEVEL: 8=MODERATE-SEVERE PAIN

## 2021-11-17 ASSESSMENT — PATIENT HEALTH QUESTIONNAIRE - PHQ9: CLINICAL INTERPRETATION OF PHQ2 SCORE: 0

## 2021-11-17 NOTE — PROGRESS NOTES
New patient note    Physiatry (physical medicine and  Rehabilitation), interventional spine and sports medicine    Date of Service: 11/17/2021    Chief complaint:   Chief Complaint   Patient presents with   • New Patient     Opioid type depend/ Hip pain       HISTORY    HPI: Anthony Lynn 89 y.o. female with a history of fibromyalgia, depression in remission, CKD who presents today for evaluation of pain in the posterior gluteal region on the left.      From what she states, she has had back pain since the age of 14 and has been treated with pain medications for at least several decades.  Currently, she is taking norco, previously has taken gabapentin and lyrica without relief.  No recent interventional procedures.    She does have intermittent pain into the thigh.  The pain seems to vary, she thinks.  Pain has been present for years.  Symptoms are intermittent, helped with pain medications.  Pain is 4/10 on the NRS with medication.    From what she reports, she was going to see another pain clinic, but is not going there any longer.  I don't have details, but her family reports that this has to do with coverage changes with her insurance.  Her pain has been managed with use of norco 7.5/325 twice a day, and recently was increased to norco 10/325 twice a day by her PCP.  only goes back to 12/05/2019, but had been stable at norco 7.5/325 #60/month.    She is a poor historian.  She is here with her daughter, Jami.   Keith, on the phone.    She has not had falls.  No particular exercise routine.  Reports that she ambulates in the household.  She has a walker and cane.    Reports some leakage of her bladder. History of irritable bowel syndrome.    Taking tizanidine, muscle relaxants.  Norco 10/325.  She has been taking tizanidine for about 2 years and norco for about 15-20 years.    Sees Dr. Johnson     Medical records review:  I reviewed the note from the referring provider Belinda Shaw A.P.* dated  11/10/2021.     Previous treatments:    Physical Therapy: No    Medications the patient is tried: Narcotics, gabapentin, lyrica    Previous interventions: none    Previous surgeries to relieve the above pain:  none      ROS:   Red Flags ROS:   Fever, Chills, Sweats: Denies  Involuntary Weight Loss: Denies  Bladder Incontinence: Denies  Bowel Incontinence: Denies  Saddle Anesthesia: Denies    All other systems reviewed and negative.       PMHx:   Past Medical History:   Diagnosis Date   • SUMA (acute kidney injury) ([facility]) 7/25/2018   • Anxiety    • Arthritis    • CAD (coronary artery disease)    • CATARACT      B/L   • Dental disorder     upper and lower dentures   • Dyslipidemia    • Fibromyalgia    • GERD (gastroesophageal reflux disease)    • Glaucoma    • Headache(784.0)    • Hives    • Hives    • HTN    • IBS (irritable bowel syndrome)    • Indigestion    • Myocardial infarct ([facility])     2005   • Pain 7/31/15    all over 3/10 (hx of fibromyalgia   • Palpitations    • Psychiatric problem     depression   • Snoring    • Toe dislocation 5/17/2012   • Unspecified disorder of thyroid     hypothroidism, no longer on meds   • Unspecified urinary incontinence        PSHx:   Past Surgical History:   Procedure Laterality Date   • TOE AMPUTATION Left 8/6/2015    Procedure: TOE AMPUTATION 2ND;  Surgeon: Fredi Nelson D.P.M.;  Location: SURGERY [facility];  Service:    • CATARACT EXTRACTION WITH IOL     • OTHER      cataracts removed       Family history   Family History   Problem Relation Age of Onset   • Heart Disease Father    • Lung Disease Father          Medications:   Current Outpatient Medications   Medication   • HYDROcodone/acetaminophen (NORCO)  MG Tab   • tizanidine (ZANAFLEX) 2 MG capsule   • Naloxone (NARCAN) 4 MG/0.1ML Liquid   • DULoxetine (CYMBALTA) 60 MG Cap DR Particles delayed-release capsule   • lisinopril (PRINIVIL) 20 MG Tab   • amLODIPine (NORVASC) 10 MG Tab   • levothyroxine (SYNTHROID)  50 MCG Tab   • cholecalciferol (D3 MAXIMUM STRENGTH) 5000 UNIT Cap   • Calcium Magnesium Zinc 333-133-5 MG Tab   • ferrous sulfate 325 (65 Fe) MG tablet   • famotidine (PEPCID) 10 MG tablet   • docusate sodium (COLACE) 100 MG Cap   • dorzolamide (TRUSOPT) 2 % Solution   • latanoprost (XALATAN) 0.005 % Solution     No current facility-administered medications for this visit.       Allergies:   Allergies   Allergen Reactions   • Nkda [No Known Drug Allergy]        Social Hx:   Social History     Socioeconomic History   • Marital status:      Spouse name: Not on file   • Number of children: Not on file   • Years of education: Not on file   • Highest education level: Not on file   Occupational History   • Not on file   Tobacco Use   • Smoking status: Never Smoker   • Smokeless tobacco: Never Used   Vaping Use   • Vaping Use: Never used   Substance and Sexual Activity   • Alcohol use: No   • Drug use: No   • Sexual activity: Never     Partners: Female, Male   Other Topics Concern   •  Service No   • Blood Transfusions No   • Caffeine Concern No   • Occupational Exposure No   • Hobby Hazards No   • Sleep Concern No   • Stress Concern No   • Weight Concern No   • Special Diet No   • Back Care No   • Exercise No   • Bike Helmet No   • Seat Belt Yes   • Self-Exams No   Social History Narrative   • Not on file     Social Determinants of Health     Financial Resource Strain:    • Difficulty of Paying Living Expenses: Not on file   Food Insecurity:    • Worried About Running Out of Food in the Last Year: Not on file   • Ran Out of Food in the Last Year: Not on file   Transportation Needs:    • Lack of Transportation (Medical): Not on file   • Lack of Transportation (Non-Medical): Not on file   Physical Activity:    • Days of Exercise per Week: Not on file   • Minutes of Exercise per Session: Not on file   Stress:    • Feeling of Stress : Not on file   Social Connections:    • Frequency of Communication with  "Friends and Family: Not on file   • Frequency of Social Gatherings with Friends and Family: Not on file   • Attends Caodaism Services: Not on file   • Active Member of Clubs or Organizations: Not on file   • Attends Club or Organization Meetings: Not on file   • Marital Status: Not on file   Intimate Partner Violence:    • Fear of Current or Ex-Partner: Not on file   • Emotionally Abused: Not on file   • Physically Abused: Not on file   • Sexually Abused: Not on file   Housing Stability:    • Unable to Pay for Housing in the Last Year: Not on file   • Number of Places Lived in the Last Year: Not on file   • Unstable Housing in the Last Year: Not on file         EXAMINATION     Physical Exam:   Vitals: /64 (BP Location: Right arm, Patient Position: Sitting, BP Cuff Size: Small adult)   Pulse 68   Temp 36.2 °C (97.2 °F) (Temporal)   Ht 1.499 m (4' 11\")   Wt 55.1 kg (121 lb 7.6 oz)   SpO2 96%     Constitutional:   Body Habitus: Body mass index is 24.53 kg/m².  Cooperation: Fully cooperates with exam  Appearance: Well-groomed, well-nourished, not disheveled, in no acute distress    Eyes: No scleral icterus, no proptosis     ENT -no obvious auditory deficits, wearing a face mask    Skin -no rashes or lesions noted     Respiratory-  breathing comfortable on room air, no audible wheezing    Cardiovascular- capillary refills less than 2 seconds. No lower extremity edema is noted.     Psychiatric- alert and oriented ×3. Normal affect.     Gait - short-stepped, normal gait, no use of ambulatory device, nonantalgic.    Musculoskeletal -   Cervical spine   Inspection: No deformities of the skin over the cervical spine. No rashes or lesions.    decreased A/P ROM in all directions, with  pain      Spurling’s sign: negative bilaterally    No signs of muscular atrophy in bilateral upper extremities       Thoracic/Lumbar Spine/Sacral Spine/Hips   Inspection: No evidence of atrophy in bilateral lower extremities " throughout     ROM: decreased AROM with flexion, extension, lateral flexion, and rotation bilaterally, with pain in extension bilaterally    Palpation:   No tenderness to palpation in midline at T1-T12 levels.  POSITIVE for tenderness to palpation to the para-midline region in the lower lumbar levels.  palpation over SI joint: positive bilaterally    palpation over buttock: positive left, negative right    palpation in hip or over the greater trochanters: negative bilaterally      Lumbar spine Special tests  Neuro tension  Straight leg test negative bilaterally      HIP  Range of motion in the hips is decreased in internal rotation, external rotation bilaterally    SI joint tests  Observation patient sits on one buttocks: Negative  JASMIN test positive bilaterally       Neuro       Key points for the international standards for neurological classification of spinal cord injury (ISNCSCI) to light touch.     Dermatome R L   C4 2 2   C5 2 2   C6 2 2   C7 2 2   C8 2 2   T1 2 2   T2 2 2   L2 2 2   L3 2 2   L4 2 2   L5 2 2   S1 2 2   S2 2 2           Motor Exam Upper Extremities *  ? Myotome R L   Shoulder flexion C5 4 4   Elbow flexion C5 4 4   Wrist extension C6 4 4   Elbow extension C7 4 4   Finger flexion C8 4 4   Finger abduction T1 4 4         Motor Exam Lower Extremities *    ? Myotome R L   Hip flexion L2 4 4   Knee extension L3 4 4   Ankle dorsiflexion L4 4 4   Toe extension L5 4 4   Ankle plantarflexion S1 4 4     * suspect this is age related, nonfocal    Lujan’s sign negative bilaterally   Babinski sign negative bilaterally   Clonus of the ankle negative bilaterally     Reflexes  ?  R L   Biceps  2+ 2+   Brachioradialis  2+ 2+   Patella  1+ 1+   Achilles   1+ 1+       MEDICAL DECISION MAKING    Medical records review: see under HPI section.     DATA    Labs:   Lab Results   Component Value Date/Time    SODIUM 139 11/10/2021 02:44 PM    POTASSIUM 5.4 11/10/2021 02:44 PM    CHLORIDE 107 11/10/2021 02:44 PM     CO2 23 11/10/2021 02:44 PM    ANION 9.0 11/10/2021 02:44 PM    GLUCOSE 111 (H) 11/10/2021 02:44 PM    BUN 34 (H) 11/10/2021 02:44 PM    CREATININE 1.68 (H) 11/10/2021 02:44 PM    CREATININE 1.21 (H) 12/10/2010 02:40 PM    CALCIUM 9.0 11/10/2021 02:44 PM    ASTSGOT 13 11/10/2021 02:44 PM    ALTSGPT 9 11/10/2021 02:44 PM    TBILIRUBIN <0.2 11/10/2021 02:44 PM    ALBUMIN 4.3 11/10/2021 02:44 PM    TOTPROTEIN 6.8 11/10/2021 02:44 PM    GLOBULIN 2.5 11/10/2021 02:44 PM    AGRATIO 1.7 11/10/2021 02:44 PM       Lab Results   Component Value Date/Time    PROTHROMBTM 14.1 07/24/2018 10:48 PM    INR 1.12 07/24/2018 10:48 PM        Lab Results   Component Value Date/Time    WBC 6.4 10/04/2021 01:31 PM    WBC 5.8 11/04/2010 01:16 PM    RBC 3.66 (L) 10/04/2021 01:31 PM    RBC 4.04 11/04/2010 01:16 PM    HEMOGLOBIN 11.6 (L) 10/04/2021 01:31 PM    HEMATOCRIT 36.3 (L) 10/04/2021 01:31 PM    MCV 99.2 (H) 10/04/2021 01:31 PM    MCV 96 11/04/2010 01:16 PM    MCH 31.7 10/04/2021 01:31 PM    MCH 31.2 11/04/2010 01:16 PM    MCHC 32.0 (L) 10/04/2021 01:31 PM    MPV 10.6 10/04/2021 01:31 PM    NEUTSPOLYS 50.10 10/04/2021 01:31 PM    LYMPHOCYTES 38.10 10/04/2021 01:31 PM    MONOCYTES 9.90 10/04/2021 01:31 PM    EOSINOPHILS 1.10 10/04/2021 01:31 PM    BASOPHILS 0.50 10/04/2021 01:31 PM        Lab Results   Component Value Date/Time    HBA1C 5.8 (H) 07/25/2018 02:48 AM        Imaging: I personally reviewed following images, these are my reads  Xray hips 09/03/2014  There is note of mild osteoarthritis of hips and note of lumbar facet arthropathy and degenerative disc disease and sacroiliac joint arthritis    IMAGING radiology reads. I reviewed the following radiology reads     Results for orders placed during the hospital encounter of 03/22/07    MR-LUMBAR SPINE-W/O    Impression  IMPRESSION:    DEGENERATIVE CHANGES MOST PROMINENT AT THE L3-4 AND L4-5 LEVELS AS  DESCRIBED ABOVE.    Results for orders placed in visit on  09/03/14    DX-HIP-COMPLETE - UNILATERAL 2+    Impression  1.  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  Additionally, bilateral sacroiliac joint osteoarthritis and lumbar spines spine facet arthropathy/disc space narrowing      Diagnosis   Visit Diagnoses     ICD-10-CM   1. Lumbosacral pain  M54.50   2. Hip pain, bilateral  M25.551    M25.552   3. Chronic, continuous use of opioids  F11.90   4. Osteopenia of multiple sites  M85.89   5. CKD (chronic kidney disease) stage 4, GFR 15-29 ml/min ([facility])  N18.4           ASSESSMENT:  Anthony Tellez Leah 89 y.o. female seen for above     Anthony was seen today for new patient.    Diagnoses and all orders for this visit:    Lumbosacral pain  -     DX-LUMBAR SPINE-2 OR 3 VIEWS; Future  -     DX-SACROILIAC JOINTS 2-; Future  -     Referral to Home Health    Hip pain, bilateral  -     DX-HIP-BILATERAL-WITH PELVIS-3/4 VIEWS; Future  -     Referral to Home Health    Chronic, continuous use of opioids  -     Pain Management Screen    Osteopenia of multiple sites  -     Referral to Home Health    CKD (chronic kidney disease) stage 4, GFR 15-29 ml/min ([facility])      1. Discussed that we will request records from her care with previous pain clinic at [facility].  Recent records are not available at this time.  2. Reviewed that she has taking chronic opioids.   reviewed.  Plan to check UDS.  Reviewed renal function and will need to monitor and consider reducing dose and considering other treatment option.  Note of osteopenia will be a consideration.  Anticipate continuing opioids, she has been on these for 20+ years, from what the family reports.  3. Discussed starting with home health care to work on home PT program.  4. Xrays of lumbar spine, hips and sacroiliac joints ordered.      Outside records requested:  The patient signed outside records request form for her outside records including outside images.      Follow-up: Return in about 4 weeks  (around 12/15/2021).      Thank you very much for asking me to participate in Anthony Rothmanulkner's care.  Please contact me with any questions or concerns.    Please note that this dictation was created using voice recognition software. I have made every reasonable attempt to correct obvious errors but there may be errors of grammar and content that I may have overlooked prior to finalization of this note.      Narendra Su MD  Physical Medicine and Rehabilitation  Interventional Spine and Sports Physiatry  [facility]           CC Belinda Shaw A.P

## 2021-11-18 ENCOUNTER — HOME HEALTH ADMISSION (OUTPATIENT)
Dept: HOME HEALTH SERVICES | Facility: HOME HEALTHCARE | Age: 86
End: 2021-11-18
Payer: MEDICARE

## 2021-11-20 ENCOUNTER — HOME CARE VISIT (OUTPATIENT)
Dept: HOME HEALTH SERVICES | Facility: HOME HEALTHCARE | Age: 86
End: 2021-11-20
Payer: MEDICARE

## 2021-11-20 VITALS
RESPIRATION RATE: 16 BRPM | BODY MASS INDEX: 23.87 KG/M2 | DIASTOLIC BLOOD PRESSURE: 72 MMHG | SYSTOLIC BLOOD PRESSURE: 132 MMHG | OXYGEN SATURATION: 95 % | HEART RATE: 75 BPM | WEIGHT: 118.2 LBS | TEMPERATURE: 97.6 F

## 2021-11-20 PROCEDURE — 665001 SOC-HOME HEALTH

## 2021-11-20 PROCEDURE — 665005 NO-PAY RAP - HOME HEALTH

## 2021-11-20 PROCEDURE — G0493 RN CARE EA 15 MIN HH/HOSPICE: HCPCS

## 2021-11-20 ASSESSMENT — PATIENT HEALTH QUESTIONNAIRE - PHQ9
2. FEELING DOWN, DEPRESSED, IRRITABLE, OR HOPELESS: 00
CLINICAL INTERPRETATION OF PHQ2 SCORE: 0
1. LITTLE INTEREST OR PLEASURE IN DOING THINGS: 00

## 2021-11-20 ASSESSMENT — ENCOUNTER SYMPTOMS
DENIES PAIN: 1
VOMITING: DENIES
NAUSEA: DENIES
PERSON REPORTING PAIN: PATIENT
DIFFICULTY THINKING: 1

## 2021-11-20 ASSESSMENT — FIBROSIS 4 INDEX: FIB4 SCORE: 1.32

## 2021-11-20 NOTE — CASE COMMUNICATION
Primary dx/Skilled need: bilateral hip pain, lumbosacral pain, osteopenia  SN frequency: 1w1, 3 PRN for s/p falls   Zip code: [id number]  Disciplines ordered: OT and PT  Insurance & authorization: snf Plus  Certification period: 11/20/21-1/18/22  Special considerations: Pt's  says that she is less confused in the afternoon 2pm-4pm and would like HH visits to be scheduled then. Schedule with pt's  (call house phone fir st) and then pt's cell phone

## 2021-11-22 ENCOUNTER — DOCUMENTATION (OUTPATIENT)
Dept: MEDICAL GROUP | Facility: PHYSICIAN GROUP | Age: 86
End: 2021-11-22

## 2021-11-22 ENCOUNTER — HOME CARE VISIT (OUTPATIENT)
Dept: HOME HEALTH SERVICES | Facility: HOME HEALTHCARE | Age: 86
End: 2021-11-22
Payer: MEDICARE

## 2021-11-22 VITALS
RESPIRATION RATE: 16 BRPM | SYSTOLIC BLOOD PRESSURE: 120 MMHG | HEART RATE: 65 BPM | OXYGEN SATURATION: 92 % | DIASTOLIC BLOOD PRESSURE: 60 MMHG | TEMPERATURE: 97.9 F

## 2021-11-22 PROCEDURE — G0151 HHCP-SERV OF PT,EA 15 MIN: HCPCS

## 2021-11-22 ASSESSMENT — ACTIVITIES OF DAILY LIVING (ADL)
CURRENT_FUNCTION: SUPERVISION
AMBULATION ASSISTANCE: SUPERVISION
AMBULATION ASSISTANCE ON FLAT SURFACES: 1
AMBULATION ASSISTANCE: 1
PHYSICAL TRANSFERS ASSESSED: 1

## 2021-11-22 ASSESSMENT — BALANCE ASSESSMENTS
SITTING BALANCE: 1 - STEADY, SAFE
NUDGED SCORE: 0
EYES CLOSED AT MAXIMUM POSITION NUDGED: 0 - UNSTEADY
IMMEDIATE STANDING BALANCE FIRST 5 SECONDS: 2 - STEADY WITHOUT WALKER OR OTHER SUPPORT
ATTEMPTS TO ARISE: 2 - ABLE TO RISE, ONE ATTEMPT
ARISING SCORE: 1
NUDGED: 0 - BEGINS TO FALL
TURNING 360 DEGREES STEPS: 1 - CONTINUOUS STEPS
BALANCE SCORE: 12
SITTING DOWN: 2 - SAFE, SMOOTH MOTION
ARISES: 1 - ABLE, USES ARMS TO HELP
STANDING BALANCE: 2 - NARROW STANCE WITHOUT SUPPORT

## 2021-11-22 ASSESSMENT — GAIT ASSESSMENTS
STEP SYMMETRY: 1 - RIGHT AND LEFT STEP LENGTH APPEAR EQUAL
TRUNK SCORE: 2
WALKING STANCE: 1 - HEELS ALMOST TOUCHING WHILE WALKING
PATH: 1 - MILD/MODERATE DEVIATION OR USES WALKING AID
TRUNK: 2 - NO SWAY, NO FLEXION, NO USE OF ARMS, NO WALKING AID
BALANCE AND GAIT SCORE: 23
PATH SCORE: 1
INITIATION OF GAIT IMMEDIATELY AFTER GO: 1 - NO HESITANCY
GAIT SCORE: 11
STEP CONTINUITY: 1 - STEPS APPEAR CONTINUOUS

## 2021-11-22 ASSESSMENT — ENCOUNTER SYMPTOMS
SUBJECTIVE PAIN PROGRESSION: WAXING AND WANING
MUSCLE WEAKNESS: 1
PAIN: 1
PAIN LOCATION - PAIN FREQUENCY: INTERMITTENT
PAIN SEVERITY GOAL: 0/10
PAIN LOCATION: LEFT HIP
LOWEST PAIN SEVERITY IN PAST 24 HOURS: 0/10
PAIN LOCATION - PAIN QUALITY: ACHY
PERSON REPORTING PAIN: PATIENT
ARTHRALGIAS: 1
PAIN LOCATION - RELIEVING FACTORS: REST, ORAL PAIN MEDS
PAIN LOCATION - PAIN SEVERITY: 2/10
HIGHEST PAIN SEVERITY IN PAST 24 HOURS: 2/10

## 2021-11-22 NOTE — PROGRESS NOTES
Medication chart review for [facility] services    PCP:  LOGAN Dallas  [address]  [place] [address]  Fax: [phone]    Current medication list     Current Outpatient Medications:   •  Calcium Magnesium Zinc, 1 Tablet, Oral, DAILY  •  ferrous sulfate, 325 mg, Oral, Q7 DAYS  •  famotidine, 10 mg, Oral, DAILY  •  docusate sodium, 100 mg, Oral, DAILY  •  dorzolamide, 1 Drop, Both Eyes, BID  •  latanoprost, 1 Drop, Both Eyes, DAILY  •  HYDROcodone/acetaminophen, Take one tablet by mouth twice a day as needed for pain for 30 days  •  tizanidine, Take one tablet by mouth at bedtime as needed.  •  Naloxone, One spray in one nostril for overdose and call 911.  •  DULoxetine, TAKE ONE CAPSULE BY MOUTH EVERY DAY  •  lisinopril, TAKE ONE TABLET BY MOUTH TWICE A DAY  •  amLODIPine, TAKE ONE TABLET BY MOUTH EVERY DAY  •  levothyroxine, TAKE ONE TABLET BY MOUTH EVERY MORNING ON AN EMPTY STOMACH  •  cholecalciferol, 5,000 Units, Oral, DAILY    Allergies   Allergen Reactions   • Nkda [No Known Drug Allergy]        Labs     Lab Results   Component Value Date/Time    SODIUM 139 11/10/2021 02:44 PM    POTASSIUM 5.4 11/10/2021 02:44 PM    CHLORIDE 107 11/10/2021 02:44 PM    CO2 23 11/10/2021 02:44 PM    GLUCOSE 111 (H) 11/10/2021 02:44 PM    BUN 34 (H) 11/10/2021 02:44 PM    CREATININE 1.68 (H) 11/10/2021 02:44 PM    CREATININE 1.21 (H) 12/10/2010 02:40 PM    BUNCREATRAT 18 11/04/2010 01:16 PM    GLOMRATE 43 (L) 12/10/2010 02:40 PM     Lab Results   Component Value Date/Time    ALKPHOSPHAT 71 11/10/2021 02:44 PM    ASTSGOT 13 11/10/2021 02:44 PM    ALTSGPT 9 11/10/2021 02:44 PM    TBILIRUBIN <0.2 11/10/2021 02:44 PM    INR 1.12 07/24/2018 10:48 PM    ALBUMIN 4.3 11/10/2021 02:44 PM        Assessment and Plan:   • Received referral from [facility]. Medications reviewed.       Reddy Colindres, PharmD, MS, BCACP, [facility]  Phone [phone] fax  [phone]    This note was created using voice recognition software (Dragon). The accuracy of the dictation is limited by the abilities of the software. I have reviewed the note prior to signing, however some errors in grammar and context are still possible. If you have any questions related to this note please do not hesitate to contact our office.

## 2021-11-29 ENCOUNTER — HOME CARE VISIT (OUTPATIENT)
Dept: HOME HEALTH SERVICES | Facility: HOME HEALTHCARE | Age: 86
End: 2021-11-29
Payer: MEDICARE

## 2021-11-29 VITALS
DIASTOLIC BLOOD PRESSURE: 60 MMHG | OXYGEN SATURATION: 96 % | HEART RATE: 84 BPM | SYSTOLIC BLOOD PRESSURE: 140 MMHG | TEMPERATURE: 98.7 F | RESPIRATION RATE: 18 BRPM

## 2021-11-29 PROCEDURE — G0157 HHC PT ASSISTANT EA 15: HCPCS | Mod: CQ

## 2021-11-29 ASSESSMENT — ENCOUNTER SYMPTOMS
PAIN: 1
SUBJECTIVE PAIN PROGRESSION: UNCHANGED
PAIN LOCATION: GENERALIZED
HIGHEST PAIN SEVERITY IN PAST 24 HOURS: 5/10
PERSON REPORTING PAIN: PATIENT
PAIN SEVERITY GOAL: 0/10
LOWEST PAIN SEVERITY IN PAST 24 HOURS: 3/10

## 2021-11-29 ASSESSMENT — ACTIVITIES OF DAILY LIVING (ADL): OASIS_M1830: 05

## 2021-11-29 NOTE — CASE COMMUNICATION
Quality Review for 11.20.21 SOC OASIS performed on by SALONI Hernandes RN on 11.29, 2021:    Edits completed by SALONI Hernandes RN:  1. Changed  to Dr. Su per admitting form   2. Changed  to 2 and  to 5 per PT eval on 11.22.21 reporting pt is impulsive with movement. Changed  to 11.22.21 date of PT eval as part of the collaboration convention  3. Changed  to 3 per [facility] 10 pain affects question response of yes  4. Changed  , ,  and  to 2,  to 2 and  to 3, [id number] C to 4, [id number] A-D to 4 per narrative pt needs supervision with ambulation, transfers and dressing and DME. Changed  to 5, pt would need showering DME to safely perform r/t per cognitive level, narrative reporting issues with balance and needing assistance with ambulation, transfers and bathing  5. Changed  to 3 per ambulation score  6. Changed  to 6  7 . Added ambulate only with assistance, correct use of support devices, proper medication list, fall risk and high risk medication precautions to safety measures  8.  Updated F2F data  9. Clicked that high risk medication education was performed per response of  as yes

## 2021-11-30 ENCOUNTER — HOSPITAL ENCOUNTER (OUTPATIENT)
Dept: RADIOLOGY | Facility: MEDICAL CENTER | Age: 86
End: 2021-11-30
Attending: PHYSICAL MEDICINE & REHABILITATION
Payer: MEDICARE

## 2021-11-30 ENCOUNTER — OFFICE VISIT (OUTPATIENT)
Dept: URGENT CARE | Facility: PHYSICIAN GROUP | Age: 86
End: 2021-11-30
Payer: MEDICARE

## 2021-11-30 ENCOUNTER — HOME CARE VISIT (OUTPATIENT)
Dept: HOME HEALTH SERVICES | Facility: HOME HEALTHCARE | Age: 86
End: 2021-11-30
Payer: MEDICARE

## 2021-11-30 VITALS
BODY MASS INDEX: 23.79 KG/M2 | HEIGHT: 59 IN | WEIGHT: 118 LBS | TEMPERATURE: 97.8 F | HEART RATE: 57 BPM | SYSTOLIC BLOOD PRESSURE: 112 MMHG | RESPIRATION RATE: 12 BRPM | OXYGEN SATURATION: 98 % | DIASTOLIC BLOOD PRESSURE: 52 MMHG

## 2021-11-30 VITALS
DIASTOLIC BLOOD PRESSURE: 60 MMHG | OXYGEN SATURATION: 98 % | SYSTOLIC BLOOD PRESSURE: 138 MMHG | HEART RATE: 65 BPM | RESPIRATION RATE: 16 BRPM | TEMPERATURE: 98.7 F

## 2021-11-30 DIAGNOSIS — M25.552 HIP PAIN, BILATERAL: ICD-10-CM

## 2021-11-30 DIAGNOSIS — M25.552 LEFT HIP PAIN: ICD-10-CM

## 2021-11-30 DIAGNOSIS — M25.551 HIP PAIN, BILATERAL: ICD-10-CM

## 2021-11-30 DIAGNOSIS — F11.20 OPIOID TYPE DEPENDENCE, CONTINUOUS (HCC): ICD-10-CM

## 2021-11-30 DIAGNOSIS — M54.50 LUMBOSACRAL PAIN: ICD-10-CM

## 2021-11-30 PROCEDURE — G0152 HHCP-SERV OF OT,EA 15 MIN: HCPCS

## 2021-11-30 PROCEDURE — 72100 X-RAY EXAM L-S SPINE 2/3 VWS: CPT

## 2021-11-30 PROCEDURE — G0180 MD CERTIFICATION HHA PATIENT: HCPCS | Performed by: PHYSICAL MEDICINE & REHABILITATION

## 2021-11-30 PROCEDURE — 99214 OFFICE O/P EST MOD 30 MIN: CPT | Performed by: PHYSICIAN ASSISTANT

## 2021-11-30 PROCEDURE — 72202 X-RAY EXAM SI JOINTS 3/> VWS: CPT

## 2021-11-30 PROCEDURE — 73521 X-RAY EXAM HIPS BI 2 VIEWS: CPT

## 2021-11-30 RX ORDER — TIZANIDINE HYDROCHLORIDE 2 MG/1
CAPSULE, GELATIN COATED ORAL
Qty: 30 CAPSULE | Refills: 0 | Status: SHIPPED | OUTPATIENT
Start: 2021-11-30 | End: 2022-01-06

## 2021-11-30 ASSESSMENT — ENCOUNTER SYMPTOMS
TINGLING: 0
HIGHEST PAIN SEVERITY IN PAST 24 HOURS: 6/10
FEVER: 0
SUBJECTIVE PAIN PROGRESSION: UNCHANGED
NAUSEA: 0
PAIN: 1
DIZZINESS: 0
PERSON REPORTING PAIN: PATIENT
FALLS: 0
HEADACHES: 0
DEPRESSION: 1
PAIN SEVERITY GOAL: 0/10
LOSS OF CONSCIOUSNESS: 0
LOWEST PAIN SEVERITY IN PAST 24 HOURS: 2/10
MYALGIAS: 1
VOMITING: 0
CHILLS: 0
ABDOMINAL PAIN: 0
BACK PAIN: 1

## 2021-11-30 ASSESSMENT — FIBROSIS 4 INDEX: FIB4 SCORE: 1.32

## 2021-11-30 ASSESSMENT — ACTIVITIES OF DAILY LIVING (ADL)
TOILETING: SUPERVISION
GROOMING_CURRENT_FUNCTION: INDEPENDENT
BATHING_CURRENT_FUNCTION: STAND BY ASSIST
GROOMING ASSESSED: 1
DRESSING_LB_CURRENT_FUNCTION: INDEPENDENT
TOILETING: 1
BATHING ASSESSED: 1
DRESSING_UB_CURRENT_FUNCTION: INDEPENDENT

## 2021-12-01 NOTE — PROGRESS NOTES
Subjective:   Anthony Lynn is a 89 y.o. female who presents for Hip Pain (L hip [id number], much worse after xrays )      HPI:  This is a very pleasant 89-year-old female presenting to the clinic with acute on chronic left hip pain x1 day.  Patient was at our outpatient imaging clinic for hip, sacroiliac and lumbar spine x-rays this afternoon.  After having her x-rays performed she started developing worsening left hip pain.  Pain is predominantly located to the left gluteal/SI region.  Pain radiates down the posterior and anterior left thigh but does not traverse the knee.  Patient has a long history of chronic pain.  She has been on chronic opioid therapy for the last 15 to 20 years.  Currently being followed by physiatry for chronic pain management.  Currently takes tizanidine and Norco 10/325 for pain.  She has not had any recent interventional management.  No recent surgeries.  Denies any fall or direct trauma.    Review of Systems   Constitutional: Negative for chills, fever and malaise/fatigue.   Cardiovascular: Negative for chest pain.   Gastrointestinal: Negative for abdominal pain, nausea and vomiting.   Musculoskeletal: Positive for back pain, joint pain and myalgias. Negative for falls.   Neurological: Negative for dizziness, tingling, loss of consciousness and headaches.       Medications:    • amLODIPine Tabs  • Calcium Magnesium Zinc Tabs  • cholecalciferol Caps  • docusate sodium Caps  • dorzolamide Soln  • DULoxetine Cpep  • famotidine  • ferrous sulfate  • HYDROcodone/acetaminophen Tabs  • latanoprost Soln  • levothyroxine Tabs  • lisinopril Tabs  • Naloxone Liqd  • tizanidine    Allergies: Nkda [no known drug allergy]    Problem List: Anthony Lynn does not have any pertinent problems on file.    Surgical History:  Past Surgical History:   Procedure Laterality Date   • TOE AMPUTATION Left 8/6/2015    Procedure: TOE AMPUTATION 2ND;  Surgeon: Fredi Nelson D.P.M.;  Location:  "SURGERY [facility];  Service:    • CATARACT EXTRACTION WITH IOL     • OTHER      cataracts removed       Past Social Hx: Anthony Lynn  reports that she has never smoked. She has never used smokeless tobacco. She reports that she does not drink alcohol and does not use drugs.     Past Family Hx:  Anthony Lynn family history includes Heart Disease in her father; Lung Disease in her father.     Problem list, medications, and allergies reviewed by myself today in Epic.     Objective:     /52   Pulse (!) 57   Temp 36.6 °C (97.8 °F) (Temporal)   Resp 12   Ht 1.499 m (4' 11\")   Wt 53.5 kg (118 lb) Comment: pt states  SpO2 98%   BMI 23.83 kg/m²     Physical Exam  Constitutional:       General: She is not in acute distress.     Appearance: Normal appearance. She is not ill-appearing, toxic-appearing or diaphoretic.      Comments: Resting comfortably in her wheelchair.   HENT:      Head: Normocephalic and atraumatic.   Eyes:      Conjunctiva/sclera: Conjunctivae normal.   Cardiovascular:      Rate and Rhythm: Normal rate and regular rhythm.      Pulses: Normal pulses.   Pulmonary:      Effort: Pulmonary effort is normal.   Musculoskeletal:      Cervical back: Normal range of motion.      Comments: Patient is able to weight-bear in clinic.  Does so with minimal complication.  Mild tenderness to palpation over the left SI joint and piriformis.  No tenderness over the greater trochanter.  Patient demonstrates full hip flexion and extension.  Pain with internal and external rotation   Skin:     General: Skin is warm.      Findings: No bruising.   Neurological:      Mental Status: She is alert.           Assessment/Plan:     Comments/MDM:     • Patient has chronic low back and hip pain.  Currently seeing physiatry for this.  Has been on opioids for pain management for the last 15 to 20 years.  Had x-rays performed and had acute onset pain after x-rays.  I believe likely due to positional changes " during x-rays.  Likely triggered an acute arthritic flare.  X-rays at this time are still pending radiologist read.  Patient does have significant osteoarthritis as expected.  I do not appreciate any acute bony fractures or positional abnormalities.  Supportive care recommendations were discussed at this time.  Pain has improved over the last half hour in clinic.  No lower extremity paresthesias.  Able to weight-bear with minimal complication.     Diagnosis and associated orders:     1. Left hip pain    2. Opioid type dependence, continuous (HCC)           Differential diagnosis, natural history, supportive care, and indications for immediate follow-up discussed.    Advised the patient to follow-up with the primary care physician for recheck, reevaluation, and consideration of further management.    Please note that this dictation was created using voice recognition software. I have made reasonable attempt to correct obvious errors, but I expect that there are errors of grammar and possibly content that I did not discover before finalizing the note.    This note was electronically signed by FARTUN Lou PA-C

## 2021-12-02 ENCOUNTER — HOME CARE VISIT (OUTPATIENT)
Dept: HOME HEALTH SERVICES | Facility: HOME HEALTHCARE | Age: 86
End: 2021-12-02
Payer: MEDICARE

## 2021-12-02 PROCEDURE — G0157 HHC PT ASSISTANT EA 15: HCPCS | Mod: CQ

## 2021-12-03 ENCOUNTER — TELEPHONE (OUTPATIENT)
Dept: MEDICAL GROUP | Facility: PHYSICIAN GROUP | Age: 86
End: 2021-12-03

## 2021-12-03 NOTE — TELEPHONE ENCOUNTER
ESTABLISHED PATIENT PRE-VISIT PLANNING     Patient was NOT contacted to complete PVP.     Note: Patient will not be contacted if there is no indication to call.     1.  Reviewed notes from the last few office visits within the medical group: Yes    2.  If any orders were placed at last visit or intended to be done for this visit (i.e. 6 mos follow-up), do we have Results/Consult Notes?         •  Labs - Labs were not ordered at last office visit.  Note: If patient appointment is for lab review and patient did not complete labs, check with provider if OK to reschedule patient until labs completed.       •  Imaging - Imaging was not ordered at last office visit.       •  Referrals - No referrals were ordered at last office visit.    3. Is this appointment scheduled as a Hospital Follow-Up? No    4.  Immunizations were updated in Epic using Reconcile Outside Information activity? Yes    5.  Patient is due for the following Health Maintenance Topics:   There are no preventive care reminders to display for this patient.    - Patient is up-to-date on all Health Maintenance topics. No records have been requested at this time.    6.  AHA (Pulse8) form printed for Provider? No, already completed

## 2021-12-03 NOTE — CASE COMMUNICATION
On arrival  answered the door he reports that Anthony is in bed and shes not getting up right now. He reports that its to early. Appointment was confirmed on last visit. He requested to cx. PT will f/u on next visit S/B Leslie Salguero PT

## 2021-12-06 ENCOUNTER — OFFICE VISIT (OUTPATIENT)
Dept: MEDICAL GROUP | Facility: PHYSICIAN GROUP | Age: 86
End: 2021-12-06
Payer: MEDICARE

## 2021-12-06 VITALS
SYSTOLIC BLOOD PRESSURE: 108 MMHG | BODY MASS INDEX: 23.59 KG/M2 | HEART RATE: 59 BPM | DIASTOLIC BLOOD PRESSURE: 38 MMHG | HEIGHT: 59 IN | OXYGEN SATURATION: 97 % | WEIGHT: 117 LBS | TEMPERATURE: 97.2 F

## 2021-12-06 DIAGNOSIS — N18.30 STAGE 3 CHRONIC KIDNEY DISEASE, UNSPECIFIED WHETHER STAGE 3A OR 3B CKD: ICD-10-CM

## 2021-12-06 DIAGNOSIS — M25.552 LEFT HIP PAIN: ICD-10-CM

## 2021-12-06 DIAGNOSIS — F11.20 OPIOID TYPE DEPENDENCE, CONTINUOUS (HCC): ICD-10-CM

## 2021-12-06 PROCEDURE — 99215 OFFICE O/P EST HI 40 MIN: CPT | Performed by: FAMILY MEDICINE

## 2021-12-06 ASSESSMENT — ENCOUNTER SYMPTOMS
DIZZINESS: 0
FALLS: 0
HEADACHES: 0
SHORTNESS OF BREATH: 0
WEIGHT LOSS: 0
CLAUDICATION: 0
FEVER: 0
BACK PAIN: 1
CHILLS: 0
COUGH: 0
PALPITATIONS: 0

## 2021-12-06 ASSESSMENT — FIBROSIS 4 INDEX: FIB4 SCORE: 1.32

## 2021-12-06 NOTE — ASSESSMENT & PLAN NOTE
Chronic ongoing problem, however patient is a poor historian and  answers for her most of the time and she is [place]. Denies falls or trauma to area. She overall has decreased strength in bilateral legs. ROM intact, negative straight leg test. Denies saddle anesthesia, numbness or tingling, or radiating into lower leg. Denies fever, chills, or myalgias beyond her baseline. Currently taking 3 ASA and 3 Tylenol daily since she has been out of her Norco that was being given by pain management. Discussed refills today for pain and to stop taking the aspirin and only take tylenol for breakthrough pain. Given her chronic pain and decreased GFR we will refill her [place]. Returns to physiatry in 2 days for follow up on imaging.

## 2021-12-06 NOTE — PROGRESS NOTES
"Subjective:     CC:   Chief Complaint   Patient presents with   • Hip Pain     imaging on 11/30, trouble walking       HPI:   Anthony presents today with follow up on hip pain. She recently was seen by Dr. Su at physiatry and had imaging done of bilateral hips, sacroiliac joints, and lumbar spine which showed degenerative changes.  states she takes 1-2 norco a day and then might skip a day. Some months she may use more than others, but typically uses 60 tabs over the course of 5-6 weeks. Last visit she was taking lower Norco dose, but stated she was using more aspirin and requested increased Norco dose so she could cut back on aspirin due to decreased kidney function. She has cut back and is now taking 1 Excedrin a day. Home health started PT/OT with patient. She has had a few visits, overall it is going well.      Opioid type dependence, continuous (HCC)  Chronic, patient is unsure when she started going to pain management, states she is going to [facility] but they are no longer taking SCP. States her pain is joint pain but states it is not \"severe\" however patient's  states at home she tells him her pain is severe at times. She is unsure where her pain is and states that she doesn't pay attention, just takes pain meds once it starts. Referral send to physiatry for pain management, I feel her pain could likely be managed without opiates however she has been on them for many years and cannot tolerated anti-inflammatories due to decreased kidney function. Last script for Norco given 11/10/21, states she still has 8 tablets left.     CKD (chronic kidney disease) stage 3, GFR 30-59 ml/min (HCC)  Chronic, has cut back down to taking one Excedrin a day versus 1000 mg daily . Will recheck GFR at next visit. Pt still declines seeing Nephrology for CKD.     Left hip pain  Chronic ongoing problem, however patient is a poor historian and  answers for her most of the time and she is " [place]. Denies falls or trauma to area. She overall has decreased strength in bilateral legs. ROM intact, negative straight leg test. Denies saddle anesthesia, numbness or tingling, or radiating into lower leg. Denies fever, chills, or myalgias beyond her baseline. Currently taking 3 ASA and 3 Tylenol daily since she has been out of her Norco that was being given by pain management. Discussed refills today for pain and to stop taking the aspirin and only take tylenol for breakthrough pain. Given her chronic pain and decreased GFR we will refill her [place]. Returns to physiatry in 2 days for follow up on imaging.       Current Outpatient Medications Ordered in Epic   Medication Sig Dispense Refill   • tizanidine (ZANAFLEX) 2 MG capsule Take one tablet by mouth at bedtime as needed for muscle spasms. 30 Capsule 0   • Calcium Magnesium Zinc 333-133-5 MG Tab Take 1 Tablet by mouth every day.     • ferrous sulfate 325 (65 Fe) MG tablet Take 325 mg by mouth every 7 days.     • famotidine (PEPCID) 10 MG tablet Take 10 mg by mouth every day.     • docusate sodium (COLACE) 100 MG Cap Take 100 mg by mouth every day.     • dorzolamide (TRUSOPT) 2 % Solution Administer 1 Drop into both eyes 2 times a day.     • latanoprost (XALATAN) 0.005 % Solution Administer 1 Drop into both eyes every day.     • HYDROcodone/acetaminophen (NORCO)  MG Tab Take one tablet by mouth twice a day as needed for pain for 30 days 60 Tablet 0   • DULoxetine (CYMBALTA) 60 MG Cap DR Particles delayed-release capsule TAKE ONE CAPSULE BY MOUTH EVERY DAY 90 Capsule 3   • lisinopril (PRINIVIL) 20 MG Tab TAKE ONE TABLET BY MOUTH TWICE A  Tablet 3   • amLODIPine (NORVASC) 10 MG Tab TAKE ONE TABLET BY MOUTH EVERY DAY 90 tablet 0   • levothyroxine (SYNTHROID) 50 MCG Tab TAKE ONE TABLET BY MOUTH EVERY MORNING ON AN EMPTY STOMACH 90 tablet 3   • cholecalciferol (D3 MAXIMUM STRENGTH) 5000 UNIT Cap Take 5,000 Units by mouth every day.     • Naloxone  "(NARCAN) 4 MG/0.1ML Liquid One spray in one nostril for overdose and call 911. (Patient not taking: Reported on 12/6/2021) 1 Each 0     No current [facility]-ordered facility-administered medications on file.       Health Maintenance: Completed    Review of Systems   Constitutional: Negative for chills, fever, malaise/fatigue and weight loss.   HENT: Positive for hearing loss. Negative for tinnitus.    Respiratory: Negative for cough and shortness of breath.    Cardiovascular: Negative for chest pain, palpitations and claudication.   Genitourinary: Negative.    Musculoskeletal: Positive for back pain and joint pain. Negative for falls.   Neurological: Negative for dizziness and headaches.         Objective:     Exam:  BP (!) 108/38 (BP Location: Left arm, Patient Position: Sitting, BP Cuff Size: Adult)   Pulse (!) 59   Temp 36.2 °C (97.2 °F) (Temporal)   Ht 1.499 m (4' 11\")   Wt 53.1 kg (117 lb)   SpO2 97%   BMI 23.63 kg/m²  Body mass index is 23.63 kg/m².    Physical Exam  Vitals reviewed.   Constitutional:       Appearance: Normal appearance.   HENT:      Mouth/Throat:      Mouth: Mucous membranes are moist.      Pharynx: Oropharynx is clear.   Eyes:      Conjunctiva/sclera: Conjunctivae normal.      Pupils: Pupils are equal, round, and reactive to light.   Cardiovascular:      Rate and Rhythm: Normal rate and regular rhythm.      Pulses: Normal pulses.      Heart sounds: Normal heart sounds. No murmur heard.      Pulmonary:      Effort: Pulmonary effort is normal. No respiratory distress.      Breath sounds: Normal breath sounds. No stridor. No wheezing or rhonchi.   Abdominal:      General: Abdomen is flat. Bowel sounds are normal.   Musculoskeletal:      Right lower leg: No edema.      Left lower leg: No edema.   Skin:     General: Skin is warm and dry.   Neurological:      Mental Status: She is alert. Mental status is at baseline.   Psychiatric:         Mood and Affect: Mood normal.         Behavior: Behavior " normal.          A chaperone was offered to the patient during today's exam. Chaperone name:  was present.      Assessment & Plan:     89 y.o. female with the following -     1. Left hip pain  Chronic, stable on current regimen. Seeing physiatry,Dr. Su.    2. Opioid type dependence, continuous (HCC)  Chronic, ongoing for many years. Continues Norco 10/325 1-2 daily as needed, states she has cut back on her Excedrin use since increased dose with better pain control.   3. Stage 3 chronic kidney disease, unspecified whether stage 3a or 3b CKD (HCC)   Chronic, refused going back to Nephrology. Discussed avoiding Nephrotoxic agents and to stop taking Excedrin, pt continues to take one a day but has cut back from prior dose. Will recheck CMP/GFR at next visit.      I spent a total of 44 minutes with record review, exam, communication with the patient, communication with other providers, and documentation of this encounter.      Return in about 3 months (around 3/6/2022).    Please note that this dictation was created using voice recognition software. I have made every reasonable attempt to correct obvious errors, but I expect that there are errors of grammar and possibly content that I did not discover before finalizing the note.

## 2021-12-06 NOTE — ASSESSMENT & PLAN NOTE
Chronic, has cut back down to taking one Excedrin a day versus 1000 mg daily . Will recheck GFR at next visit. Pt still declines seeing Nephrology for CKD.

## 2021-12-06 NOTE — ASSESSMENT & PLAN NOTE
"Chronic, patient is unsure when she started going to pain management, states she is going to [facility] but they are no longer taking SCP. States her pain is joint pain but states it is not \"severe\" however patient's  states at home she tells him her pain is severe at times. She is unsure where her pain is and states that she doesn't pay attention, just takes pain meds once it starts. Referral send to physiatry for pain management, I feel her pain could likely be managed without opiates however she has been on them for many years and cannot tolerated anti-inflammatories due to decreased kidney function. Last script for Norco given 11/10/21, states she still has 8 tablets left.   "

## 2021-12-07 ENCOUNTER — HOME CARE VISIT (OUTPATIENT)
Dept: HOME HEALTH SERVICES | Facility: HOME HEALTHCARE | Age: 86
End: 2021-12-07
Payer: MEDICARE

## 2021-12-07 VITALS
HEART RATE: 64 BPM | OXYGEN SATURATION: 95 % | SYSTOLIC BLOOD PRESSURE: 128 MMHG | DIASTOLIC BLOOD PRESSURE: 62 MMHG | RESPIRATION RATE: 16 BRPM | TEMPERATURE: 98.6 F

## 2021-12-07 PROCEDURE — G0151 HHCP-SERV OF PT,EA 15 MIN: HCPCS

## 2021-12-08 ENCOUNTER — OFFICE VISIT (OUTPATIENT)
Dept: PHYSICAL MEDICINE AND REHAB | Facility: MEDICAL CENTER | Age: 86
End: 2021-12-08
Payer: MEDICARE

## 2021-12-08 VITALS
TEMPERATURE: 97.4 F | DIASTOLIC BLOOD PRESSURE: 70 MMHG | OXYGEN SATURATION: 94 % | SYSTOLIC BLOOD PRESSURE: 124 MMHG | HEART RATE: 71 BPM | WEIGHT: 117.06 LBS | HEIGHT: 59 IN | BODY MASS INDEX: 23.6 KG/M2

## 2021-12-08 DIAGNOSIS — M51.36 DDD (DEGENERATIVE DISC DISEASE), LUMBAR: ICD-10-CM

## 2021-12-08 DIAGNOSIS — Z79.899 MEDICATION MANAGEMENT: ICD-10-CM

## 2021-12-08 DIAGNOSIS — M16.0 PRIMARY OSTEOARTHRITIS OF BOTH HIPS: ICD-10-CM

## 2021-12-08 DIAGNOSIS — M48.061 SPINAL STENOSIS OF LUMBAR REGION, UNSPECIFIED WHETHER NEUROGENIC CLAUDICATION PRESENT: ICD-10-CM

## 2021-12-08 DIAGNOSIS — N18.30 STAGE 3 CHRONIC KIDNEY DISEASE, UNSPECIFIED WHETHER STAGE 3A OR 3B CKD: ICD-10-CM

## 2021-12-08 PROCEDURE — 99215 OFFICE O/P EST HI 40 MIN: CPT | Performed by: PHYSICAL MEDICINE & REHABILITATION

## 2021-12-08 RX ORDER — HYDROCODONE BITARTRATE AND ACETAMINOPHEN 7.5; 325 MG/1; MG/1
1 TABLET ORAL EVERY 8 HOURS PRN
Qty: 90 TABLET | Refills: 0 | Status: SHIPPED | OUTPATIENT
Start: 2021-12-10 | End: 2022-01-09

## 2021-12-08 RX ORDER — HYDROCODONE BITARTRATE AND ACETAMINOPHEN 7.5; 325 MG/1; MG/1
1 TABLET ORAL EVERY 8 HOURS PRN
Qty: 90 TABLET | Refills: 0 | Status: SHIPPED | OUTPATIENT
Start: 2022-01-09 | End: 2022-02-08

## 2021-12-08 ASSESSMENT — ENCOUNTER SYMPTOMS
SUBJECTIVE PAIN PROGRESSION: WAXING AND WANING
PAIN LOCATION - PAIN SEVERITY: 5/10
PAIN SEVERITY GOAL: 0/10
LOWEST PAIN SEVERITY IN PAST 24 HOURS: 0/10
PAIN LOCATION - EXACERBATING FACTORS: STANDING OR WALKING
PERSON REPORTING PAIN: PATIENT
HIGHEST PAIN SEVERITY IN PAST 24 HOURS: 6/10
PAIN: 1
PAIN LOCATION: BACK
DEBILITATING PAIN: 1
PAIN LOCATION - PAIN FREQUENCY: INTERMITTENT

## 2021-12-08 ASSESSMENT — PAIN SCALES - GENERAL: PAINLEVEL: 4=SLIGHT-MODERATE PAIN

## 2021-12-08 ASSESSMENT — FIBROSIS 4 INDEX: FIB4 SCORE: 1.32

## 2021-12-08 ASSESSMENT — PATIENT HEALTH QUESTIONNAIRE - PHQ9: CLINICAL INTERPRETATION OF PHQ2 SCORE: 0

## 2021-12-08 NOTE — PROGRESS NOTES
Follow-up patient note    Physiatry (physical medicine and  Rehabilitation), interventional spine and sports medicine    Date of Service: 12/08/2021    Chief complaint:   Chief Complaint   Patient presents with   • Follow-Up     Lower back and hip pain       HISTORY    HPI: Anthony Lynn 89 y.o. female with a history of fibromyalgia, depression in remission, CKD who presents today for evaluation of pain in the posterior gluteal region on the left.      Left low back is painful.  Difficulty with pain at times, makes it difficult to walk.    Norco seems to be helpful.  With the medication, her pain is 2/10 on the NRS.  No side effects, she reports that she has bowel movements, approximately every other day. Some leakage of her bladder. History of irritable bowel syndrome.    Using a walker helps with her mobility.  Home PT started.  She reports that she had some significant pain after the first visit.      No new symptoms.  No new falls.    She is here with her daughter, Jami, who assists with the HPI.    By history:  Taking tizanidine, muscle relaxants.  Norco 10/325.  She has been taking tizanidine for about 2 years and norco for about 15-20 years.      Medical records review:  I reviewed the note from the referring provider Belinda Shaw A.P.* dated 11/10/2021.     Previous treatments:    Physical Therapy: No    Medications the patient is tried: Narcotics, gabapentin, lyrica    Previous interventions: none    Previous surgeries to relieve the above pain:  none      ROS:   Red Flags ROS:   Fever, Chills, Sweats: Denies  Involuntary Weight Loss: Denies  Bladder Incontinence: Denies  Bowel Incontinence: Denies  Saddle Anesthesia: Denies    All other systems reviewed and negative.       PMHx:   Past Medical History:   Diagnosis Date   • SUMA (acute kidney injury) ([facility]) 7/25/2018   • Anxiety    • Arthritis    • CAD (coronary artery disease)    • CATARACT      B/L   • Dental disorder     upper and lower  dentures   • Dyslipidemia    • Fibromyalgia    • GERD (gastroesophageal reflux disease)    • Glaucoma    • Headache(784.0)    • Hives    • Hives    • HTN    • IBS (irritable bowel syndrome)    • Indigestion    • Myocardial infarct (HCC)     2005   • Pain 7/31/15    all over 3/10 (hx of fibromyalgia   • Palpitations    • Psychiatric problem     depression   • Snoring    • Toe dislocation 5/17/2012   • Unspecified disorder of thyroid     hypothroidism, no longer on meds   • Unspecified urinary incontinence        PSHx:   Past Surgical History:   Procedure Laterality Date   • TOE AMPUTATION Left 8/6/2015    Procedure: TOE AMPUTATION 2ND;  Surgeon: Fredi Nelson D.P.M.;  Location: SURGERY [facility];  Service:    • CATARACT EXTRACTION WITH IOL     • OTHER      cataracts removed       Family history   Family History   Problem Relation Age of Onset   • Heart Disease Father    • Lung Disease Father          Medications:   Current Outpatient Medications   Medication   • [START ON 1/9/2022] HYDROcodone-acetaminophen (NORCO) 7.5-325 MG per tablet   • [START ON 12/10/2021] HYDROcodone-acetaminophen (NORCO) 7.5-325 MG per tablet   • tizanidine (ZANAFLEX) 2 MG capsule   • Calcium Magnesium Zinc 333-133-5 MG Tab   • ferrous sulfate 325 (65 Fe) MG tablet   • famotidine (PEPCID) 10 MG tablet   • docusate sodium (COLACE) 100 MG Cap   • dorzolamide (TRUSOPT) 2 % Solution   • latanoprost (XALATAN) 0.005 % Solution   • Naloxone (NARCAN) 4 MG/0.1ML Liquid   • DULoxetine (CYMBALTA) 60 MG Cap DR Particles delayed-release capsule   • lisinopril (PRINIVIL) 20 MG Tab   • amLODIPine (NORVASC) 10 MG Tab   • levothyroxine (SYNTHROID) 50 MCG Tab   • cholecalciferol (D3 MAXIMUM STRENGTH) 5000 UNIT Cap     No current facility-administered medications for this visit.       Allergies:   Allergies   Allergen Reactions   • Nkda [No Known Drug Allergy]        Social Hx:   Social History     Socioeconomic History   • Marital status:       Spouse name: Not on file   • Number of children: Not on file   • Years of education: Not on file   • Highest education level: Not on file   Occupational History   • Not on file   Tobacco Use   • Smoking status: Never Smoker   • Smokeless tobacco: Never Used   Vaping Use   • Vaping Use: Never used   Substance and Sexual Activity   • Alcohol use: No   • Drug use: No   • Sexual activity: Never     Partners: Female, Male   Other Topics Concern   •  Service No   • Blood Transfusions No   • Caffeine Concern No   • Occupational Exposure No   • Hobby Hazards No   • Sleep Concern No   • Stress Concern No   • Weight Concern No   • Special Diet No   • Back Care No   • Exercise No   • Bike Helmet No   • Seat Belt Yes   • Self-Exams No   Social History Narrative   • Not on file     Social Determinants of Health     Financial Resource Strain:    • Difficulty of Paying Living Expenses: Not on file   Food Insecurity:    • Worried About Running Out of Food in the Last Year: Not on file   • Ran Out of Food in the Last Year: Not on file   Transportation Needs:    • Lack of Transportation (Medical): Not on file   • Lack of Transportation (Non-Medical): Not on file   Physical Activity:    • Days of Exercise per Week: Not on file   • Minutes of Exercise per Session: Not on file   Stress:    • Feeling of Stress : Not on file   Social Connections:    • Frequency of Communication with Friends and Family: Not on file   • Frequency of Social Gatherings with Friends and Family: Not on file   • Attends Restorationism Services: Not on file   • Active Member of Clubs or Organizations: Not on file   • Attends Club or Organization Meetings: Not on file   • Marital Status: Not on file   Intimate Partner Violence:    • Fear of Current or Ex-Partner: Not on file   • Emotionally Abused: Not on file   • Physically Abused: Not on file   • Sexually Abused: Not on file   Housing Stability:    • Unable to Pay for Housing in the Last Year: Not on file  "  • Number of Places Lived in the Last Year: Not on file   • Unstable Housing in the Last Year: Not on file         EXAMINATION     Physical Exam:   Vitals: /70 (BP Location: Right arm, Patient Position: Sitting, BP Cuff Size: Small adult)   Pulse 71   Temp 36.3 °C (97.4 °F) (Temporal)   Ht 1.499 m (4' 11\")   Wt 53.1 kg (117 lb 1 oz)   SpO2 94%     Constitutional:   Body Habitus: Body mass index is 23.64 kg/m².  Cooperation: Fully cooperates with exam  Appearance: Well-groomed, well-nourished, not disheveled, in no acute distress    Eyes: No scleral icterus, no proptosis     ENT -no obvious auditory deficits, wearing a face mask    Skin -no rashes or lesions noted     Respiratory-  breathing comfortable on room air, no audible wheezing    Cardiovascular- capillary refills less than 2 seconds. No lower extremity edema is noted.     Psychiatric- alert and oriented ×3. Normal affect.     Gait - short-stepped, normal gait, no use of ambulatory device, nonantalgic.    Musculoskeletal -     Thoracic/Lumbar Spine/Sacral Spine/Hips   Inspection: No evidence of atrophy in bilateral lower extremities throughout     Palpation:   No tenderness to palpation in midline at T1-T12 levels.  POSITIVE for tenderness to palpation to the para-midline region in the lower lumbar levels.  palpation over SI joint: positive bilaterally    palpation over buttock: positive left, negative right    palpation in hip or over the greater trochanters: negative bilaterally      Lumbar spine Special tests  Neuro tension  Seated straight leg test negative bilaterally        Neuro       Key points for the international standards for neurological classification of spinal cord injury (ISNCSCI) to light touch.     Dermatome R L   L2 2 2   L3 2 2   L4 2 2   L5 2 2   S1 2 2   S2 2 2       Motor Exam Lower Extremities     ? Myotome R L   Hip flexion L2 4 4   Knee extension L3 4 4   Ankle dorsiflexion L4 4 4   Toe extension L5 4 4   Ankle " plantarflexion S1 4 4     * suspect this is age related, nonfocal      Reflexes  ?  R L   Patella  1+ 1+   Achilles   1+ 1+       MEDICAL DECISION MAKING    Medical records review: see under HPI section.     DATA    Labs:   Lab Results   Component Value Date/Time    SODIUM 139 11/10/2021 02:44 PM    POTASSIUM 5.4 11/10/2021 02:44 PM    CHLORIDE 107 11/10/2021 02:44 PM    CO2 23 11/10/2021 02:44 PM    ANION 9.0 11/10/2021 02:44 PM    GLUCOSE 111 (H) 11/10/2021 02:44 PM    BUN 34 (H) 11/10/2021 02:44 PM    CREATININE 1.68 (H) 11/10/2021 02:44 PM    CREATININE 1.21 (H) 12/10/2010 02:40 PM    CALCIUM 9.0 11/10/2021 02:44 PM    ASTSGOT 13 11/10/2021 02:44 PM    ALTSGPT 9 11/10/2021 02:44 PM    TBILIRUBIN <0.2 11/10/2021 02:44 PM    ALBUMIN 4.3 11/10/2021 02:44 PM    TOTPROTEIN 6.8 11/10/2021 02:44 PM    GLOBULIN 2.5 11/10/2021 02:44 PM    AGRATIO 1.7 11/10/2021 02:44 PM       Lab Results   Component Value Date/Time    PROTHROMBTM 14.1 07/24/2018 10:48 PM    INR 1.12 07/24/2018 10:48 PM        Lab Results   Component Value Date/Time    WBC 6.4 10/04/2021 01:31 PM    WBC 5.8 11/04/2010 01:16 PM    RBC 3.66 (L) 10/04/2021 01:31 PM    RBC 4.04 11/04/2010 01:16 PM    HEMOGLOBIN 11.6 (L) 10/04/2021 01:31 PM    HEMATOCRIT 36.3 (L) 10/04/2021 01:31 PM    MCV 99.2 (H) 10/04/2021 01:31 PM    MCV 96 11/04/2010 01:16 PM    MCH 31.7 10/04/2021 01:31 PM    MCH 31.2 11/04/2010 01:16 PM    MCHC 32.0 (L) 10/04/2021 01:31 PM    MPV 10.6 10/04/2021 01:31 PM    NEUTSPOLYS 50.10 10/04/2021 01:31 PM    LYMPHOCYTES 38.10 10/04/2021 01:31 PM    MONOCYTES 9.90 10/04/2021 01:31 PM    EOSINOPHILS 1.10 10/04/2021 01:31 PM    BASOPHILS 0.50 10/04/2021 01:31 PM        Lab Results   Component Value Date/Time    HBA1C 5.8 (H) 07/25/2018 02:48 AM        Imaging: I personally reviewed following images, these are my reads  Xray lumbar spine 11/30/2021  There is diffuse degenerative change, particularly at L4-5, with rightward curve, minimal retrolisthesis  L2-3, minimal anterolisthesis at L3-4.  Degenerative changes of lumbar facets.    Xray sacroiliac joints 11/30/2021  There is degenerative change in the sacroiliac joints bilaterally    Xray hips 11/30/2021  There is moderate joint space narrowing on the right and mild on the left with acetabular spurs.      Xray hips 09/03/2014  There is note of mild osteoarthritis of hips and note of lumbar facet arthropathy and degenerative disc disease and sacroiliac joint arthritis    IMAGING radiology reads. I reviewed the following radiology reads  Xray lumbar spine 12/01/2021    IMPRESSION:     1.  Multilevel degenerative changes as above described.  2.  Minimal retrolisthesis of L2 on L3.  3.  Minimal anterolisthesis of L3 on L4.  4.  Atherosclerotic plaque.  5.  Minimal rightward curvature of the lumbar spine.    Xray hips 11/30/2021     IMPRESSION:     1.  No fracture or dislocation is seen.  2.  Degenerative changes of the hips, right greater than left.  3.  Degenerative changes in the visualized lower lumbar spine and sacroiliac joints.    Xray sacroiliac joint 11/30/2021    IMPRESSION:     1.  Degenerative changes of the sacroiliac joints bilaterally.  2.  No evidence of erosion or fusion at the SI joints.  3.  Degenerative changes of the hips, right greater than left.    Results for orders placed during the hospital encounter of 03/22/07    MR-LUMBAR SPINE-W/O  FINDINGS:     The L5-S1 level is unremarkable.       The L4-5 level reveals broad-based disc bulging with some facet and   ligamentous changes. These findings cause mild central and moderate   bilateral L4 foraminal stenosis.     The L3-4 level reveals broad-based disc bulging with prominent facet and   ligamentous changes. These findings cause mild-to-moderate central and   moderate bilateral L3 foraminal stenosis.     The L2-3, L1-2, and T12-L1 levels are unremarkable.       The bone marrow signal is unremarkable. The distal cord and conus are   unremarkable  as well.        IMPRESSION:    DEGENERATIVE CHANGES MOST PROMINENT AT THE L3-4 AND L4-5 LEVELS AS  DESCRIBED ABOVE.    Results for orders placed in visit on 09/03/14    DX-HIP-COMPLETE - UNILATERAL 2+    Impression  1.  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  Additionally, bilateral sacroiliac joint osteoarthritis and lumbar spines spine facet arthropathy/disc space narrowing      Diagnosis   Visit Diagnoses     ICD-10-CM   1. Spinal stenosis of lumbar region, unspecified whether neurogenic claudication present  M48.061   2. Stage 3 chronic kidney disease, unspecified whether stage 3a or 3b CKD (HCC)  N18.30   3. DDD (degenerative disc disease), lumbar  M51.36   4. Primary osteoarthritis of both hips  M16.0   5. Medication management  Z79.899           ASSESSMENT:  Anthony Rosalinda Lynn 89 y.o. female seen for above     Anthony was seen today for follow-up.    Diagnoses and all orders for this visit:    Spinal stenosis of lumbar region, unspecified whether neurogenic claudication present  -     HYDROcodone-acetaminophen (NORCO) 7.5-325 MG per tablet; Take 1 Tablet by mouth every 8 hours as needed for up to 30 days.  -     HYDROcodone-acetaminophen (NORCO) 7.5-325 MG per tablet; Take 1 Tablet by mouth every 8 hours as needed for up to 30 days.  -     Consent for Opiate Prescription  -     Controlled Substance Treatment Agreement    Stage 3 chronic kidney disease, unspecified whether stage 3a or 3b CKD (HCC)  -     Comp Metabolic Panel; Future    DDD (degenerative disc disease), lumbar  -     HYDROcodone-acetaminophen (NORCO) 7.5-325 MG per tablet; Take 1 Tablet by mouth every 8 hours as needed for up to 30 days.  -     HYDROcodone-acetaminophen (NORCO) 7.5-325 MG per tablet; Take 1 Tablet by mouth every 8 hours as needed for up to 30 days.  -     Consent for Opiate Prescription  -     Controlled Substance Treatment Agreement    Primary osteoarthritis of both hips  -     HYDROcodone-acetaminophen (NORCO)  7.5-325 MG per tablet; Take 1 Tablet by mouth every 8 hours as needed for up to 30 days.  -     HYDROcodone-acetaminophen (NORCO) 7.5-325 MG per tablet; Take 1 Tablet by mouth every 8 hours as needed for up to 30 days.  -     Consent for Opiate Prescription  -     Controlled Substance Treatment Agreement    Medication management  -     Comp Metabolic Panel; Future       1. Discussed that her care is palliative.  She has diffuse degenerative changes with known lumbar spinal stenosis on MRI lumbar spine 2007.  2. Reviewed PT and that she has had worsening pain after first home PT visit.  Discussed plan to continue with home health.  Caution regarding PT outside of the home.  3. Reviewed medications and , UDS from 11/17/2021 consistent with current medications.  Plan to change to norco 7.5/325 two to three times a day.  Discussed caution about possible side effects and renal function.  Plan to check CMP in the future.  Scripts given for the next two months.  Discussed that she has narcan at home.  4. Xrays of lumbar spine, hips and sacroiliac joints reviewed.  No evidence of fractures.    Outside records requested:  The patient signed outside records request form for her outside records including outside images.      Follow-up: Return in about 5 weeks (around 1/12/2022).    My total time spent caring for the patient on the day of the encounter was 40-54 minutes.     Thank you very much for asking me to participate in Anthony Lynn's care.  Please contact me with any questions or concerns.    Please note that this dictation was created using voice recognition software. I have made every reasonable attempt to correct obvious errors but there may be errors of grammar and content that I may have overlooked prior to finalization of this note.      Narendra Su MD  Physical Medicine and Rehabilitation  Interventional Spine and Sports Physiatry  [facility]       CC Belinda Shaw A.P

## 2021-12-09 ENCOUNTER — HOME CARE VISIT (OUTPATIENT)
Dept: HOME HEALTH SERVICES | Facility: HOME HEALTHCARE | Age: 86
End: 2021-12-09
Payer: MEDICARE

## 2021-12-09 PROCEDURE — G0152 HHCP-SERV OF OT,EA 15 MIN: HCPCS

## 2021-12-10 ENCOUNTER — HOME CARE VISIT (OUTPATIENT)
Dept: HOME HEALTH SERVICES | Facility: HOME HEALTHCARE | Age: 86
End: 2021-12-10
Payer: MEDICARE

## 2021-12-10 VITALS
RESPIRATION RATE: 18 BRPM | SYSTOLIC BLOOD PRESSURE: 138 MMHG | DIASTOLIC BLOOD PRESSURE: 66 MMHG | HEART RATE: 83 BPM | TEMPERATURE: 98.3 F | OXYGEN SATURATION: 95 %

## 2021-12-10 VITALS
OXYGEN SATURATION: 95 % | TEMPERATURE: 98.2 F | SYSTOLIC BLOOD PRESSURE: 122 MMHG | RESPIRATION RATE: 16 BRPM | HEART RATE: 75 BPM | DIASTOLIC BLOOD PRESSURE: 58 MMHG

## 2021-12-10 PROCEDURE — G0151 HHCP-SERV OF PT,EA 15 MIN: HCPCS

## 2021-12-10 ASSESSMENT — ENCOUNTER SYMPTOMS
LOWEST PAIN SEVERITY IN PAST 24 HOURS: 3/10
PERSON REPORTING PAIN: PATIENT
SUBJECTIVE PAIN PROGRESSION: UNCHANGED
PAIN: 1
DEPRESSION: 1
HIGHEST PAIN SEVERITY IN PAST 24 HOURS: 6/10
PAIN SEVERITY GOAL: 0/10

## 2021-12-11 ASSESSMENT — GAIT ASSESSMENTS
TRUNK SCORE: 1
PATH SCORE: 1
STEP SYMMETRY: 1 - RIGHT AND LEFT STEP LENGTH APPEAR EQUAL
GAIT SCORE: 10
WALKING STANCE: 1 - HEELS ALMOST TOUCHING WHILE WALKING
STEP CONTINUITY: 1 - STEPS APPEAR CONTINUOUS
INITIATION OF GAIT IMMEDIATELY AFTER GO: 1 - NO HESITANCY
TRUNK: 1 - NO SWAY BUT FLEXION OF KNEES OR BACK OR SPREADS ARMS WHILE WALKING
BALANCE AND GAIT SCORE: 25
PATH: 1 - MILD/MODERATE DEVIATION OR USES WALKING AID

## 2021-12-11 ASSESSMENT — BALANCE ASSESSMENTS
STANDING BALANCE: 2 - NARROW STANCE WITHOUT SUPPORT
SITTING BALANCE: 1 - STEADY, SAFE
TURNING 360 DEGREES STEPS: 1 - CONTINUOUS STEPS
IMMEDIATE STANDING BALANCE FIRST 5 SECONDS: 2 - STEADY WITHOUT WALKER OR OTHER SUPPORT
NUDGED SCORE: 2
SITTING DOWN: 2 - SAFE, SMOOTH MOTION
BALANCE SCORE: 15
ARISES: 2 - ABLE WITHOUT USING ARMS
NUDGED: 2 - STEADY
EYES CLOSED AT MAXIMUM POSITION NUDGED: 0 - UNSTEADY
ATTEMPTS TO ARISE: 2 - ABLE TO RISE, ONE ATTEMPT
ARISING SCORE: 2

## 2021-12-11 ASSESSMENT — ENCOUNTER SYMPTOMS
DENIES PAIN: 1
PAIN SEVERITY GOAL: 0/10
PERSON REPORTING PAIN: PATIENT
LOWEST PAIN SEVERITY IN PAST 24 HOURS: 0/10
HIGHEST PAIN SEVERITY IN PAST 24 HOURS: 0/10

## 2021-12-16 ENCOUNTER — HOME CARE VISIT (OUTPATIENT)
Dept: HOME HEALTH SERVICES | Facility: HOME HEALTHCARE | Age: 86
End: 2021-12-16
Payer: MEDICARE

## 2021-12-16 DIAGNOSIS — I10 ESSENTIAL HYPERTENSION: ICD-10-CM

## 2021-12-16 PROCEDURE — G0152 HHCP-SERV OF OT,EA 15 MIN: HCPCS

## 2021-12-16 RX ORDER — AMLODIPINE BESYLATE 10 MG/1
TABLET ORAL
Qty: 100 TABLET | Refills: 0 | Status: SHIPPED | OUTPATIENT
Start: 2021-12-16 | End: 2022-06-23

## 2021-12-16 NOTE — TELEPHONE ENCOUNTER
Requested Prescriptions     Pending Prescriptions Disp Refills   • amLODIPine (NORVASC) 10 MG Tab [Pharmacy Med Name: AMLODIPINE BESYLATE 10MG TABS] 100 Tablet 0     Sig: TAKE ONE TABLET BY MOUTH EVERY DAY   Iram Gusman M.D.

## 2021-12-17 VITALS
OXYGEN SATURATION: 94 % | RESPIRATION RATE: 18 BRPM | SYSTOLIC BLOOD PRESSURE: 142 MMHG | HEART RATE: 73 BPM | TEMPERATURE: 97.8 F | DIASTOLIC BLOOD PRESSURE: 82 MMHG

## 2021-12-17 ASSESSMENT — ACTIVITIES OF DAILY LIVING (ADL)
HOME_HEALTH_OASIS: 00
OASIS_M1830: 01

## 2021-12-17 ASSESSMENT — ENCOUNTER SYMPTOMS
ANGER WITHIN DEFINED LIMITS: 1
HIGHEST PAIN SEVERITY IN PAST 24 HOURS: 5/10
AGGRESSION WITHIN DEFINED LIMITS: 1
LOWEST PAIN SEVERITY IN PAST 24 HOURS: 3/10
DEPRESSION: 1
PERSON REPORTING PAIN: PATIENT
PAIN SEVERITY GOAL: 0/10
SUBJECTIVE PAIN PROGRESSION: UNCHANGED
PAIN: 1

## 2021-12-17 ASSESSMENT — PATIENT HEALTH QUESTIONNAIRE - PHQ9: CLINICAL INTERPRETATION OF PHQ2 SCORE: 0

## 2021-12-23 ENCOUNTER — HOME CARE VISIT (OUTPATIENT)
Dept: HOME HEALTH SERVICES | Facility: HOME HEALTHCARE | Age: 86
End: 2021-12-23
Payer: MEDICARE

## 2021-12-23 NOTE — CASE COMMUNICATION
Quality Review Completed for DC OASIS by AVA Brito, RN on 12/22/, 2021:  Edits completed by AVA Brito RN:  1.  is yes to F depression per care plan interventions  2.  D. Changed to #1 for family is available  3.  is NA for no supportive documentation found for significant medication criteria  4.  chnaged to #2 less than daily per EMR reporting and in narrative statement patient has no pain  5.  is yes for the  current flu season and  is no, other reasons vaccine is not on record in EMR

## 2022-01-01 ENCOUNTER — HOSPITAL ENCOUNTER (OUTPATIENT)
Facility: MEDICAL CENTER | Age: 87
End: 2022-12-28
Attending: STUDENT IN AN ORGANIZED HEALTH CARE EDUCATION/TRAINING PROGRAM
Payer: MEDICARE

## 2022-01-01 ENCOUNTER — HOSPITAL ENCOUNTER (OUTPATIENT)
Dept: LAB | Facility: MEDICAL CENTER | Age: 87
End: 2022-11-30
Attending: FAMILY MEDICINE
Payer: MEDICARE

## 2022-01-01 ENCOUNTER — OFFICE VISIT (OUTPATIENT)
Dept: MEDICAL GROUP | Facility: PHYSICIAN GROUP | Age: 87
End: 2022-01-01
Payer: MEDICARE

## 2022-01-01 ENCOUNTER — DOCUMENTATION (OUTPATIENT)
Dept: HEALTH INFORMATION MANAGEMENT | Facility: OTHER | Age: 87
End: 2022-01-01
Payer: MEDICARE

## 2022-01-01 VITALS
RESPIRATION RATE: 13 BRPM | DIASTOLIC BLOOD PRESSURE: 62 MMHG | OXYGEN SATURATION: 98 % | BODY MASS INDEX: 22.58 KG/M2 | WEIGHT: 115 LBS | TEMPERATURE: 97.5 F | SYSTOLIC BLOOD PRESSURE: 140 MMHG | HEART RATE: 76 BPM | HEIGHT: 60 IN

## 2022-01-01 DIAGNOSIS — E03.8 OTHER SPECIFIED HYPOTHYROIDISM: Chronic | ICD-10-CM

## 2022-01-01 DIAGNOSIS — M79.7 FIBROMYALGIA: ICD-10-CM

## 2022-01-01 DIAGNOSIS — Z00.00 HEALTHCARE MAINTENANCE: ICD-10-CM

## 2022-01-01 DIAGNOSIS — M46.1 DEGENERATIVE JOINT DISEASE OF SACROILIAC JOINT (HCC): ICD-10-CM

## 2022-01-01 DIAGNOSIS — I10 PRIMARY HYPERTENSION: Chronic | ICD-10-CM

## 2022-01-01 DIAGNOSIS — F11.20 UNCOMPLICATED OPIOID DEPENDENCE (HCC): ICD-10-CM

## 2022-01-01 LAB
25(OH)D3 SERPL-MCNC: 38 NG/ML (ref 30–100)
6MAM UR CFM-MCNC: <10 NG/ML
ALBUMIN SERPL BCP-MCNC: 4.3 G/DL (ref 3.2–4.9)
ALBUMIN/GLOB SERPL: 1.5 G/DL
ALP SERPL-CCNC: 64 U/L (ref 30–99)
ALT SERPL-CCNC: 12 U/L (ref 2–50)
AMBIGUOUS DTTM AMBI4: NORMAL
AMPHET CTO UR CFM-MCNC: NEGATIVE NG/ML
AMPHET CTO UR CFM-MCNC: NEGATIVE NG/ML
ANION GAP SERPL CALC-SCNC: 13 MMOL/L (ref 7–16)
AST SERPL-CCNC: 24 U/L (ref 12–45)
BARBITURATES CTO UR CFM-MCNC: NEGATIVE NG/ML
BARBITURATES CTO UR CFM-MCNC: NEGATIVE NG/ML
BASOPHILS # BLD AUTO: 0.7 % (ref 0–1.8)
BASOPHILS # BLD: 0.04 K/UL (ref 0–0.12)
BENZODIAZ CTO UR CFM-MCNC: NEGATIVE NG/ML
BENZODIAZ CTO UR CFM-MCNC: NEGATIVE NG/ML
BILIRUB SERPL-MCNC: 0.2 MG/DL (ref 0.1–1.5)
BUN SERPL-MCNC: 32 MG/DL (ref 8–22)
CALCIUM SERPL-MCNC: 9.9 MG/DL (ref 8.5–10.5)
CANNABINOIDS CTO UR CFM-MCNC: NEGATIVE NG/ML
CANNABINOIDS CTO UR CFM-MCNC: NEGATIVE NG/ML
CHLORIDE SERPL-SCNC: 105 MMOL/L (ref 96–112)
CHOLEST SERPL-MCNC: 195 MG/DL (ref 100–199)
CO2 SERPL-SCNC: 22 MMOL/L (ref 20–33)
COCAINE CTO UR CFM-MCNC: NEGATIVE NG/ML
COCAINE CTO UR CFM-MCNC: NEGATIVE NG/ML
CODEINE UR CFM-MCNC: <20 NG/ML
CREAT SERPL-MCNC: 1.33 MG/DL (ref 0.5–1.4)
DRUG COMMENT 753798: NORMAL
DRUG COMMENT 753798: NORMAL
EOSINOPHIL # BLD AUTO: 0.14 K/UL (ref 0–0.51)
EOSINOPHIL NFR BLD: 2.4 % (ref 0–6.9)
ERYTHROCYTE [DISTWIDTH] IN BLOOD BY AUTOMATED COUNT: 53.5 FL (ref 35.9–50)
FASTING STATUS PATIENT QL REPORTED: NORMAL
GFR SERPLBLD CREATININE-BSD FMLA CKD-EPI: 38 ML/MIN/1.73 M 2
GLOBULIN SER CALC-MCNC: 2.9 G/DL (ref 1.9–3.5)
GLUCOSE SERPL-MCNC: 93 MG/DL (ref 65–99)
HCT VFR BLD AUTO: 36 % (ref 37–47)
HDLC SERPL-MCNC: 63 MG/DL
HGB BLD-MCNC: 11.5 G/DL (ref 12–16)
HYDROCODONE UR CFM-MCNC: 932 NG/ML
HYDROMORPHONE UR CFM-MCNC: <20 NG/ML
IMM GRANULOCYTES # BLD AUTO: 0.01 K/UL (ref 0–0.11)
IMM GRANULOCYTES NFR BLD AUTO: 0.2 % (ref 0–0.9)
LDLC SERPL CALC-MCNC: 113 MG/DL
LYMPHOCYTES # BLD AUTO: 2.65 K/UL (ref 1–4.8)
LYMPHOCYTES NFR BLD: 44.8 % (ref 22–41)
MCH RBC QN AUTO: 31.4 PG (ref 27–33)
MCHC RBC AUTO-ENTMCNC: 31.9 G/DL (ref 33.6–35)
MCV RBC AUTO: 98.4 FL (ref 81.4–97.8)
METHADONE CTO UR CFM-MCNC: NEGATIVE NG/ML
METHADONE CTO UR CFM-MCNC: NEGATIVE NG/ML
MONOCYTES # BLD AUTO: 0.56 K/UL (ref 0–0.85)
MONOCYTES NFR BLD AUTO: 9.5 % (ref 0–13.4)
MORPHINE UR CFM-MCNC: <20 NG/ML
NEUTROPHILS # BLD AUTO: 2.52 K/UL (ref 2–7.15)
NEUTROPHILS NFR BLD: 42.4 % (ref 44–72)
NORHYDROCODONE UR CFM-MCNC: 1774 NG/ML
NOROXYCODONE UR CFM-MCNC: <20 NG/ML
NRBC # BLD AUTO: 0 K/UL
NRBC BLD-RTO: 0 /100 WBC
OPIATES CTO UR CFM-MCNC: POSITIVE NG/ML
OPIATES CTO UR CFM-MCNC: POSITIVE NG/ML
OPIATES UR NOROXYM Q0836: <20 NG/ML
OXYCODONE UR CFM-MCNC: <20 NG/ML
OXYMORPHONE UR CFM-MCNC: <20 NG/ML
PCP CTO UR CFM-MCNC: NEGATIVE NG/ML
PCP CTO UR CFM-MCNC: NEGATIVE NG/ML
PLATELET # BLD AUTO: 312 K/UL (ref 164–446)
PMV BLD AUTO: 10.4 FL (ref 9–12.9)
POTASSIUM SERPL-SCNC: 4.3 MMOL/L (ref 3.6–5.5)
PROPOXYPH CTO UR CFM-MCNC: NEGATIVE NG/ML
PROPOXYPH CTO UR CFM-MCNC: NEGATIVE NG/ML
PROT SERPL-MCNC: 7.2 G/DL (ref 6–8.2)
RBC # BLD AUTO: 3.66 M/UL (ref 4.2–5.4)
SODIUM SERPL-SCNC: 140 MMOL/L (ref 135–145)
TRIGL SERPL-MCNC: 97 MG/DL (ref 0–149)
TSH SERPL DL<=0.005 MIU/L-ACNC: 2.76 UIU/ML (ref 0.38–5.33)
WBC # BLD AUTO: 5.9 K/UL (ref 4.8–10.8)

## 2022-01-01 PROCEDURE — 80061 LIPID PANEL: CPT

## 2022-01-01 PROCEDURE — 80307 DRUG TEST PRSMV CHEM ANLYZR: CPT

## 2022-01-01 PROCEDURE — 84443 ASSAY THYROID STIM HORMONE: CPT

## 2022-01-01 PROCEDURE — 80053 COMPREHEN METABOLIC PANEL: CPT

## 2022-01-01 PROCEDURE — 36415 COLL VENOUS BLD VENIPUNCTURE: CPT

## 2022-01-01 PROCEDURE — 99214 OFFICE O/P EST MOD 30 MIN: CPT | Performed by: STUDENT IN AN ORGANIZED HEALTH CARE EDUCATION/TRAINING PROGRAM

## 2022-01-01 PROCEDURE — G0480 DRUG TEST DEF 1-7 CLASSES: HCPCS

## 2022-01-01 PROCEDURE — 82306 VITAMIN D 25 HYDROXY: CPT

## 2022-01-01 PROCEDURE — 85025 COMPLETE CBC W/AUTO DIFF WBC: CPT

## 2022-01-01 RX ORDER — DULOXETIN HYDROCHLORIDE 60 MG/1
60 CAPSULE, DELAYED RELEASE ORAL
Qty: 100 CAPSULE | Refills: 0 | Status: SHIPPED | OUTPATIENT
Start: 2022-01-01 | End: 2023-01-01 | Stop reason: SDUPTHER

## 2022-01-01 RX ORDER — DULOXETIN HYDROCHLORIDE 30 MG/1
30 CAPSULE, DELAYED RELEASE ORAL DAILY
Qty: 30 CAPSULE | Refills: 2 | Status: SHIPPED | OUTPATIENT
Start: 2022-01-01 | End: 2023-01-01 | Stop reason: SDUPTHER

## 2022-01-01 ASSESSMENT — FIBROSIS 4 INDEX: FIB4 SCORE: 2.020725942163690176

## 2022-01-06 RX ORDER — TIZANIDINE 2 MG/1
TABLET ORAL
Qty: 30 TABLET | Refills: 0 | Status: SHIPPED | OUTPATIENT
Start: 2022-01-06 | End: 2022-05-11 | Stop reason: SDUPTHER

## 2022-01-10 ENCOUNTER — HOSPITAL ENCOUNTER (OUTPATIENT)
Dept: LAB | Facility: MEDICAL CENTER | Age: 87
End: 2022-01-10
Attending: PHYSICAL MEDICINE & REHABILITATION
Payer: MEDICARE

## 2022-01-10 DIAGNOSIS — Z79.899 MEDICATION MANAGEMENT: ICD-10-CM

## 2022-01-10 DIAGNOSIS — N18.30 STAGE 3 CHRONIC KIDNEY DISEASE, UNSPECIFIED WHETHER STAGE 3A OR 3B CKD: ICD-10-CM

## 2022-01-10 LAB
ALBUMIN SERPL BCP-MCNC: 4.4 G/DL (ref 3.2–4.9)
ALBUMIN/GLOB SERPL: 1.5 G/DL
ALP SERPL-CCNC: 65 U/L (ref 30–99)
ALT SERPL-CCNC: 7 U/L (ref 2–50)
ANION GAP SERPL CALC-SCNC: 13 MMOL/L (ref 7–16)
AST SERPL-CCNC: 17 U/L (ref 12–45)
BILIRUB SERPL-MCNC: 0.2 MG/DL (ref 0.1–1.5)
BUN SERPL-MCNC: 28 MG/DL (ref 8–22)
CALCIUM SERPL-MCNC: 9.3 MG/DL (ref 8.5–10.5)
CHLORIDE SERPL-SCNC: 106 MMOL/L (ref 96–112)
CO2 SERPL-SCNC: 21 MMOL/L (ref 20–33)
CREAT SERPL-MCNC: 1.49 MG/DL (ref 0.5–1.4)
GLOBULIN SER CALC-MCNC: 3 G/DL (ref 1.9–3.5)
GLUCOSE SERPL-MCNC: 89 MG/DL (ref 65–99)
POTASSIUM SERPL-SCNC: 4.2 MMOL/L (ref 3.6–5.5)
PROT SERPL-MCNC: 7.4 G/DL (ref 6–8.2)
SODIUM SERPL-SCNC: 140 MMOL/L (ref 135–145)

## 2022-01-10 PROCEDURE — 80053 COMPREHEN METABOLIC PANEL: CPT

## 2022-01-10 PROCEDURE — 36415 COLL VENOUS BLD VENIPUNCTURE: CPT

## 2022-01-12 ENCOUNTER — OFFICE VISIT (OUTPATIENT)
Dept: PHYSICAL MEDICINE AND REHAB | Facility: MEDICAL CENTER | Age: 87
End: 2022-01-12
Payer: MEDICARE

## 2022-01-12 VITALS
WEIGHT: 121.03 LBS | DIASTOLIC BLOOD PRESSURE: 60 MMHG | OXYGEN SATURATION: 95 % | HEIGHT: 60 IN | HEART RATE: 66 BPM | BODY MASS INDEX: 23.76 KG/M2 | SYSTOLIC BLOOD PRESSURE: 112 MMHG | TEMPERATURE: 97 F

## 2022-01-12 DIAGNOSIS — F11.90 CHRONIC, CONTINUOUS USE OF OPIOIDS: ICD-10-CM

## 2022-01-12 DIAGNOSIS — M48.061 SPINAL STENOSIS OF LUMBAR REGION, UNSPECIFIED WHETHER NEUROGENIC CLAUDICATION PRESENT: ICD-10-CM

## 2022-01-12 DIAGNOSIS — N18.30 STAGE 3 CHRONIC KIDNEY DISEASE, UNSPECIFIED WHETHER STAGE 3A OR 3B CKD: ICD-10-CM

## 2022-01-12 DIAGNOSIS — M51.36 DDD (DEGENERATIVE DISC DISEASE), LUMBAR: ICD-10-CM

## 2022-01-12 DIAGNOSIS — M16.0 PRIMARY OSTEOARTHRITIS OF BOTH HIPS: ICD-10-CM

## 2022-01-12 PROCEDURE — 99214 OFFICE O/P EST MOD 30 MIN: CPT | Performed by: PHYSICAL MEDICINE & REHABILITATION

## 2022-01-12 ASSESSMENT — PATIENT HEALTH QUESTIONNAIRE - PHQ9: CLINICAL INTERPRETATION OF PHQ2 SCORE: 0

## 2022-01-12 ASSESSMENT — PAIN SCALES - GENERAL: PAINLEVEL: 5=MODERATE PAIN

## 2022-01-12 ASSESSMENT — FIBROSIS 4 INDEX: FIB4 SCORE: 1.95

## 2022-01-12 NOTE — PROGRESS NOTES
Follow-up patient note    Physiatry (physical medicine and  Rehabilitation), interventional spine and sports medicine    Date of Service: 1/12/2022    Chief complaint:   Chief Complaint   Patient presents with   • Follow-Up     Back pain       HISTORY    HPI: Anthony Lynn 89 y.o. female with a history of fibromyalgia, depression in remission, CKD who presents today for evaluation of pain in the posterior gluteal region on the left.      Since the last visit, Anthony started home PT and OT.  It is very difficult for her to get out of the house and the idea of going to outpatient PT is difficult.   Getting dressed and ready to get out of the house takes a very long time.    From what she and her daughter reports, her  is taking care of assisting with norco 7.5/325mg.  The patient reports that her pain is manageable with use of pain medications.  It sounds like she does not take medications on a regular schedule.  Bowel movements are every three days.  She is not taking medications regularly for this.     She is not always using her walker.  No new falls.    She is here with her daughter, Jami, who assists with the HPI.    By history:  Taking tizanidine, muscle relaxants.  Norco 10/325.  She has been taking tizanidine for about 2 years and norco for about 15-20 years.      Medical records review:  I reviewed the note from the referring provider Belinda Shaw A.P.* dated 11/10/2021.     Previous treatments:    Physical Therapy: No    Medications the patient is tried: Narcotics, gabapentin, lyrica    Previous interventions: none    Previous surgeries to relieve the above pain:  none      ROS:   Red Flags ROS:   Fever, Chills, Sweats: Denies  Involuntary Weight Loss: Denies  Bladder Incontinence: Denies  Bowel Incontinence: Denies  Saddle Anesthesia: Denies    All other systems reviewed and negative.       PMHx:   Past Medical History:   Diagnosis Date   • SUMA (acute kidney injury) ([facility]) 7/25/2018   •  Anxiety    • Arthritis    • CAD (coronary artery disease)    • CATARACT      B/L   • Dental disorder     upper and lower dentures   • Dyslipidemia    • Fibromyalgia    • GERD (gastroesophageal reflux disease)    • Glaucoma    • Headache(784.0)    • Hives    • Hives    • HTN    • IBS (irritable bowel syndrome)    • Indigestion    • Myocardial infarct (HCC)     2005   • Pain 7/31/15    all over 3/10 (hx of fibromyalgia   • Palpitations    • Psychiatric problem     depression   • Snoring    • Toe dislocation 5/17/2012   • Unspecified disorder of thyroid     hypothroidism, no longer on meds   • Unspecified urinary incontinence        PSHx:   Past Surgical History:   Procedure Laterality Date   • TOE AMPUTATION Left 8/6/2015    Procedure: TOE AMPUTATION 2ND;  Surgeon: Fredi Nelson D.P.M.;  Location: SURGERY [facility] [place];  Service:    • CATARACT EXTRACTION WITH IOL     • OTHER      cataracts removed       Family history   Family History   Problem Relation Age of Onset   • Heart Disease Father    • Lung Disease Father          Medications:   Current Outpatient Medications   Medication   • tizanidine (ZANAFLEX) 2 MG tablet   • amLODIPine (NORVASC) 10 MG Tab   • HYDROcodone-acetaminophen (NORCO) 7.5-325 MG per tablet   • Calcium Magnesium Zinc 333-133-5 MG Tab   • ferrous sulfate 325 (65 Fe) MG tablet   • famotidine (PEPCID) 10 MG tablet   • docusate sodium (COLACE) 100 MG Cap   • dorzolamide (TRUSOPT) 2 % Solution   • latanoprost (XALATAN) 0.005 % Solution   • Naloxone (NARCAN) 4 MG/0.1ML Liquid   • DULoxetine (CYMBALTA) 60 MG Cap DR Particles delayed-release capsule   • lisinopril (PRINIVIL) 20 MG Tab   • levothyroxine (SYNTHROID) 50 MCG Tab   • cholecalciferol (D3 MAXIMUM STRENGTH) 5000 UNIT Cap     No current facility-administered medications for this visit.       Allergies:   Allergies   Allergen Reactions   • Nkda [No Known Drug Allergy]        Social Hx:   Social History     Socioeconomic History   •  Marital status:      Spouse name: Not on file   • Number of children: Not on file   • Years of education: Not on file   • Highest education level: Not on file   Occupational History   • Not on file   Tobacco Use   • Smoking status: Never Smoker   • Smokeless tobacco: Never Used   Vaping Use   • Vaping Use: Never used   Substance and Sexual Activity   • Alcohol use: No   • Drug use: No   • Sexual activity: Never     Partners: Female, Male   Other Topics Concern   •  Service No   • Blood Transfusions No   • Caffeine Concern No   • Occupational Exposure No   • Hobby Hazards No   • Sleep Concern No   • Stress Concern No   • Weight Concern No   • Special Diet No   • Back Care No   • Exercise No   • Bike Helmet No   • Seat Belt Yes   • Self-Exams No   Social History Narrative   • Not on file     Social Determinants of Health     Financial Resource Strain:    • Difficulty of Paying Living Expenses: Not on file   Food Insecurity:    • Worried About Running Out of Food in the Last Year: Not on file   • Ran Out of Food in the Last Year: Not on file   Transportation Needs:    • Lack of Transportation (Medical): Not on file   • Lack of Transportation (Non-Medical): Not on file   Physical Activity:    • Days of Exercise per Week: Not on file   • Minutes of Exercise per Session: Not on file   Stress:    • Feeling of Stress : Not on file   Social Connections:    • Frequency of Communication with Friends and Family: Not on file   • Frequency of Social Gatherings with Friends and Family: Not on file   • Attends Bahai Services: Not on file   • Active Member of Clubs or Organizations: Not on file   • Attends Club or Organization Meetings: Not on file   • Marital Status: Not on file   Intimate Partner Violence:    • Fear of Current or Ex-Partner: Not on file   • Emotionally Abused: Not on file   • Physically Abused: Not on file   • Sexually Abused: Not on file   Housing Stability:    • Unable to Pay for Housing in  "the Last Year: Not on file   • Number of Places Lived in the Last Year: Not on file   • Unstable Housing in the Last Year: Not on file         EXAMINATION     Physical Exam:   Vitals: /60 (BP Location: Right arm, Patient Position: Sitting, BP Cuff Size: Adult)   Pulse 66   Temp 36.1 °C (97 °F) (Temporal)   Ht 1.511 m (4' 11.5\")   Wt 54.9 kg (121 lb 0.5 oz)   SpO2 95%     Constitutional:   Body Habitus: Body mass index is 24.04 kg/m².  Cooperation: Fully cooperates with exam  Appearance: Well-groomed, well-nourished, not disheveled, in no acute distress    Eyes: No scleral icterus, no proptosis     ENT -no obvious auditory deficits, wearing a face mask    Skin -no rashes or lesions noted     Respiratory-  breathing comfortable on room air, no audible wheezing    Cardiovascular- No lower extremity edema is noted.     Psychiatric- alert and oriented ×3. Normal affect.     Gait - short-stepped, normal gait, using a walker, mildly antalgic.      MEDICAL DECISION MAKING    Medical records review: see under HPI section.     DATA    Labs:   Lab Results   Component Value Date/Time    SODIUM 140 01/10/2022 01:56 PM    POTASSIUM 4.2 01/10/2022 01:56 PM    CHLORIDE 106 01/10/2022 01:56 PM    CO2 21 01/10/2022 01:56 PM    ANION 13.0 01/10/2022 01:56 PM    GLUCOSE 89 01/10/2022 01:56 PM    BUN 28 (H) 01/10/2022 01:56 PM    CREATININE 1.49 (H) 01/10/2022 01:56 PM    CREATININE 1.21 (H) 12/10/2010 02:40 PM    CALCIUM 9.3 01/10/2022 01:56 PM    ASTSGOT 17 01/10/2022 01:56 PM    ALTSGPT 7 01/10/2022 01:56 PM    TBILIRUBIN 0.2 01/10/2022 01:56 PM    ALBUMIN 4.4 01/10/2022 01:56 PM    TOTPROTEIN 7.4 01/10/2022 01:56 PM    GLOBULIN 3.0 01/10/2022 01:56 PM    AGRATIO 1.5 01/10/2022 01:56 PM       Lab Results   Component Value Date/Time    PROTHROMBTM 14.1 07/24/2018 10:48 PM    INR 1.12 07/24/2018 10:48 PM        Lab Results   Component Value Date/Time    WBC 6.4 10/04/2021 01:31 PM    WBC 5.8 11/04/2010 01:16 PM    RBC 3.66 " (L) 10/04/2021 01:31 PM    RBC 4.04 11/04/2010 01:16 PM    HEMOGLOBIN 11.6 (L) 10/04/2021 01:31 PM    HEMATOCRIT 36.3 (L) 10/04/2021 01:31 PM    MCV 99.2 (H) 10/04/2021 01:31 PM    MCV 96 11/04/2010 01:16 PM    MCH 31.7 10/04/2021 01:31 PM    MCH 31.2 11/04/2010 01:16 PM    MCHC 32.0 (L) 10/04/2021 01:31 PM    MPV 10.6 10/04/2021 01:31 PM    NEUTSPOLYS 50.10 10/04/2021 01:31 PM    LYMPHOCYTES 38.10 10/04/2021 01:31 PM    MONOCYTES 9.90 10/04/2021 01:31 PM    EOSINOPHILS 1.10 10/04/2021 01:31 PM    BASOPHILS 0.50 10/04/2021 01:31 PM        Lab Results   Component Value Date/Time    HBA1C 5.8 (H) 07/25/2018 02:48 AM        Imaging: I personally reviewed following images, these are my reads  Xray lumbar spine 11/30/2021  There is diffuse degenerative change, particularly at L4-5, with rightward curve, minimal retrolisthesis L2-3, minimal anterolisthesis at L3-4.  Degenerative changes of lumbar facets.    Xray sacroiliac joints 11/30/2021  There is degenerative change in the sacroiliac joints bilaterally    Xray hips 11/30/2021  There is moderate joint space narrowing on the right and mild on the left with acetabular spurs.      Xray hips 09/03/2014  There is note of mild osteoarthritis of hips and note of lumbar facet arthropathy and degenerative disc disease and sacroiliac joint arthritis    IMAGING radiology reads. I reviewed the following radiology reads  Xray lumbar spine 12/01/2021    IMPRESSION:     1.  Multilevel degenerative changes as above described.  2.  Minimal retrolisthesis of L2 on L3.  3.  Minimal anterolisthesis of L3 on L4.  4.  Atherosclerotic plaque.  5.  Minimal rightward curvature of the lumbar spine.    Xray hips 11/30/2021     IMPRESSION:     1.  No fracture or dislocation is seen.  2.  Degenerative changes of the hips, right greater than left.  3.  Degenerative changes in the visualized lower lumbar spine and sacroiliac joints.    Xray sacroiliac joint 11/30/2021    IMPRESSION:     1.   Degenerative changes of the sacroiliac joints bilaterally.  2.  No evidence of erosion or fusion at the SI joints.  3.  Degenerative changes of the hips, right greater than left.    Results for orders placed during the hospital encounter of 03/22/07    MR-LUMBAR SPINE-W/O  FINDINGS:     The L5-S1 level is unremarkable.       The L4-5 level reveals broad-based disc bulging with some facet and   ligamentous changes. These findings cause mild central and moderate   bilateral L4 foraminal stenosis.     The L3-4 level reveals broad-based disc bulging with prominent facet and   ligamentous changes. These findings cause mild-to-moderate central and   moderate bilateral L3 foraminal stenosis.     The L2-3, L1-2, and T12-L1 levels are unremarkable.       The bone marrow signal is unremarkable. The distal cord and conus are   unremarkable as well.        IMPRESSION:    DEGENERATIVE CHANGES MOST PROMINENT AT THE L3-4 AND L4-5 LEVELS AS  DESCRIBED ABOVE.    Results for orders placed in visit on 09/03/14    DX-HIP-COMPLETE - UNILATERAL 2+    Impression  1.  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  Additionally, bilateral sacroiliac joint osteoarthritis and lumbar spines spine facet arthropathy/disc space narrowing      Diagnosis   Visit Diagnoses     ICD-10-CM   1. Spinal stenosis of lumbar region, unspecified whether neurogenic claudication present  M48.061   2. Stage 3 chronic kidney disease, unspecified whether stage 3a or 3b CKD (HCC)  N18.30   3. Chronic, continuous use of opioids  F11.90   4. Primary osteoarthritis of both hips  M16.0   5. DDD (degenerative disc disease), lumbar  M51.36           ASSESSMENT:  Anthony Rosalinda Lynn 89 y.o. female seen for above     Anthony was seen today for follow-up.    Diagnoses and all orders for this visit:    Spinal stenosis of lumbar region, unspecified whether neurogenic claudication present    Stage 3 chronic kidney disease, unspecified whether stage 3a or 3b CKD  (HCC)    Chronic, continuous use of opioids    Primary osteoarthritis of both hips    DDD (degenerative disc disease), lumbar       1. Discussed that her care is palliative and that treatment options are limited due to her advanced age and CKD.  2. She has done PT.  3. Reviewed medications and , UDS from 11/17/2021 consistent with current medications.  Discussed scheduling her pain medication twice a day and using the third dose prn during the day.    4. Plan for next visit as a telehealth visit and to plan to have her  part of the visit, since he helps with her medication.  Discussed that she has narcan at home.      Outside records requested:  The patient signed outside records request form for her outside records including outside images.      Follow-up: Return in about 3 weeks (around 2/2/2022) for Virtual visit.    My total time spent caring for the patient on the day of the encounter was 40-54 minutes.     Thank you very much for asking me to participate in Anthony Lynn's care.  Please contact me with any questions or concerns.    Please note that this dictation was created using voice recognition software. I have made every reasonable attempt to correct obvious errors but there may be errors of grammar and content that I may have overlooked prior to finalization of this note.      Narendra Su MD  Physical Medicine and Rehabilitation  Interventional Spine and Sports Physiatry  [facility]       Belinda Cobos A.P

## 2022-01-17 NOTE — CASE COMMUNICATION
I agree with these changes.  KESHAWN Clark/L  ----- Message -----  From: Torie Brito R.N.  Sent: 1/14/2022  11:57 AM PST  To: Hodan Briggs OT      Resending second time for response to CDI revisions

## 2022-02-01 ENCOUNTER — APPOINTMENT (OUTPATIENT)
Dept: PHYSICAL MEDICINE AND REHAB | Facility: MEDICAL CENTER | Age: 87
End: 2022-02-01
Payer: MEDICARE

## 2022-02-02 ENCOUNTER — HOSPITAL ENCOUNTER (EMERGENCY)
Facility: MEDICAL CENTER | Age: 87
End: 2022-02-02
Attending: EMERGENCY MEDICINE
Payer: MEDICARE

## 2022-02-02 VITALS
DIASTOLIC BLOOD PRESSURE: 79 MMHG | HEIGHT: 60 IN | OXYGEN SATURATION: 97 % | BODY MASS INDEX: 21.99 KG/M2 | HEART RATE: 60 BPM | RESPIRATION RATE: 16 BRPM | WEIGHT: 111.99 LBS | TEMPERATURE: 98.9 F | SYSTOLIC BLOOD PRESSURE: 173 MMHG

## 2022-02-02 DIAGNOSIS — U07.1 COVID: ICD-10-CM

## 2022-02-02 PROCEDURE — M0247 HCHG MISC ORTHO ITEM RC 0274: HCPCS

## 2022-02-02 PROCEDURE — 700105 HCHG RX REV CODE 258: Performed by: EMERGENCY MEDICINE

## 2022-02-02 PROCEDURE — 306637 HCHG MISC ORTHO ITEM RC 0274

## 2022-02-02 PROCEDURE — 99284 EMERGENCY DEPT VISIT MOD MDM: CPT

## 2022-02-02 PROCEDURE — 700111 HCHG RX REV CODE 636 W/ 250 OVERRIDE (IP): Performed by: EMERGENCY MEDICINE

## 2022-02-02 RX ADMIN — DEXTROSE MONOHYDRATE 500 MG: 50 INJECTION, SOLUTION INTRAVENOUS at 14:01

## 2022-02-02 ASSESSMENT — FIBROSIS 4 INDEX: FIB4 SCORE: 1.95

## 2022-02-02 NOTE — ED TRIAGE NOTES
Chief Complaint   Patient presents with   • Coronavirus Screening     c/o difficulty taking a deep breath, fatigue - denies any fevers; tested + for COVID yesterday, sent here for antibody therapy by PCP     /65   Pulse 73   Temp 36.5 °C (97.7 °F) (Temporal)   Resp 19   Ht 1.524 m (5')   Wt 50.8 kg (111 lb 15.9 oz)   SpO2 96%   BMI 21.87 kg/m²     Pt BIB family for above concern.     Has this patient been vaccinated for COVID - NO

## 2022-02-02 NOTE — ED PROVIDER NOTES
ED Provider Note    CHIEF COMPLAINT  Chief Complaint   Patient presents with   • Coronavirus Screening     c/o difficulty taking a deep breath, fatigue - denies any fevers; tested + for COVID yesterday, sent here for antibody therapy by PCP       HAIDER  Anthony Lynn is a 89 y.o. female who presents to the emergency department with complaint that she had a positive Covid test yesterday.  The patient's  is in the ICU now for Covid as he is respiratory distress.  She states she started having slight symptoms approximately 6 days ago his symptoms include just overall fatigue.  She denies fever, shakes, chills, sweats, nausea, vomiting, abdominal pain, swelling of her lower extremities, hemoptysis.  The patient is not vaccinated for COVID.    REVIEW OF SYSTEMS  Positives as above. Pertinent negatives include fever, shakes, chills, sweats, nausea, vomiting   All other 10 review of systems are negative    PAST MEDICAL HISTORY  Past Medical History:   Diagnosis Date   • SUMA (acute kidney injury) ([facility]) 7/25/2018   • Anxiety    • Arthritis    • CAD (coronary artery disease)    • CATARACT      B/L   • Dental disorder     upper and lower dentures   • Dyslipidemia    • Fibromyalgia    • GERD (gastroesophageal reflux disease)    • Glaucoma    • Headache(784.0)    • Hives    • Hives    • HTN    • IBS (irritable bowel syndrome)    • Indigestion    • Myocardial infarct ([facility])     2005   • Pain 7/31/15    all over 3/10 (hx of fibromyalgia   • Palpitations    • Psychiatric problem     depression   • Snoring    • Toe dislocation 5/17/2012   • Unspecified disorder of thyroid     hypothroidism, no longer on meds   • Unspecified urinary incontinence        FAMILY HISTORY  Noncontributory    SOCIAL HISTORY  Social History     Socioeconomic History   • Marital status:      Spouse name: Not on file   • Number of children: Not on file   • Years of education: Not on file   • Highest education level: Not on file   Occupational  History   • Not on file   Tobacco Use   • Smoking status: Never Smoker   • Smokeless tobacco: Never Used   Vaping Use   • Vaping Use: Never used   Substance and Sexual Activity   • Alcohol use: No   • Drug use: No   • Sexual activity: Never     Partners: Female, Male   Other Topics Concern   •  Service No   • Blood Transfusions No   • Caffeine Concern No   • Occupational Exposure No   • Hobby Hazards No   • Sleep Concern No   • Stress Concern No   • Weight Concern No   • Special Diet No   • Back Care No   • Exercise No   • Bike Helmet No   • Seat Belt Yes   • Self-Exams No   Social History Narrative   • Not on file     Social Determinants of Health     Financial Resource Strain:    • Difficulty of Paying Living Expenses: Not on file   Food Insecurity:    • Worried About Running Out of Food in the Last Year: Not on file   • Ran Out of Food in the Last Year: Not on file   Transportation Needs:    • Lack of Transportation (Medical): Not on file   • Lack of Transportation (Non-Medical): Not on file   Physical Activity:    • Days of Exercise per Week: Not on file   • Minutes of Exercise per Session: Not on file   Stress:    • Feeling of Stress : Not on file   Social Connections:    • Frequency of Communication with Friends and Family: Not on file   • Frequency of Social Gatherings with Friends and Family: Not on file   • Attends Sikh Services: Not on file   • Active Member of Clubs or Organizations: Not on file   • Attends Club or Organization Meetings: Not on file   • Marital Status: Not on file   Intimate Partner Violence:    • Fear of Current or Ex-Partner: Not on file   • Emotionally Abused: Not on file   • Physically Abused: Not on file   • Sexually Abused: Not on file   Housing Stability:    • Unable to Pay for Housing in the Last Year: Not on file   • Number of Places Lived in the Last Year: Not on file   • Unstable Housing in the Last Year: Not on file       SURGICAL HISTORY  Past Surgical History:    Procedure Laterality Date   • TOE AMPUTATION Left 8/6/2015    Procedure: TOE AMPUTATION 2ND;  Surgeon: Fredi Nelson D.P.M.;  Location: SURGERY [facility];  Service:    • CATARACT EXTRACTION WITH IOL     • OTHER      cataracts removed       CURRENT MEDICATIONS  Home Medications     Reviewed by Miroslava Silva R.N. (Registered Nurse) on 02/02/22 at 1206  Med List Status: Not Addressed   Medication Last Dose Status   amLODIPine (NORVASC) 10 MG Tab  Active   Calcium Magnesium Zinc 333-133-5 MG Tab  Active   cholecalciferol (D3 MAXIMUM STRENGTH) 5000 UNIT Cap  Active   docusate sodium (COLACE) 100 MG Cap  Active   dorzolamide (TRUSOPT) 2 % Solution  Active   DULoxetine (CYMBALTA) 60 MG Cap DR Particles delayed-release capsule  Active   famotidine (PEPCID) 10 MG tablet  Active   ferrous sulfate 325 (65 Fe) MG tablet  Active   HYDROcodone-acetaminophen (NORCO) 7.5-325 MG per tablet  Active   latanoprost (XALATAN) 0.005 % Solution  Active   levothyroxine (SYNTHROID) 50 MCG Tab  Active   lisinopril (PRINIVIL) 20 MG Tab  Active   Naloxone (NARCAN) 4 MG/0.1ML Liquid  Active   tizanidine (ZANAFLEX) 2 MG tablet  Active                ALLERGIES  Allergies   Allergen Reactions   • Nkda [No Known Drug Allergy]        PHYSICAL EXAM  VITAL SIGNS: /65   Pulse 73   Temp 36.5 °C (97.7 °F) (Temporal)   Resp 19   Ht 1.524 m (5')   Wt 50.8 kg (111 lb 15.9 oz)   SpO2 96%   BMI 21.87 kg/m²      Constitutional: Well developed, Well nourished, No acute distress, Non-toxic appearance.   Eyes: PERRLA, EOMI, Conjunctiva normal, No discharge.   Cardiovascular: Normal heart rate, Normal rhythm, No murmurs, No rubs, No gallops, and intact distal pulses.   Thorax & Lungs:  No respiratory distress, no rales, no rhonchi, No wheezing, No chest wall tenderness.   Skin: Warm, Dry, No erythema, No rash.   Extremities: Full range of motion, no deformity, no edema.  Neurologic: Alert & oriented x 3, No focal deficits noted,  acting appropriately on exam.  Psychiatric: Affect normal for clinical presentation.        COURSE & MEDICAL DECISION MAKING  Pertinent Labs & Imaging studies reviewed. (See chart for details)  This is a pleasant 89-year-old female presents with Covid positive test.  Here her vital signs are stable, she has no evidence of hypoxia, no evidence of impending respiratory distress or respiratory failure.  The patient is an excellent candidate for Sotrovimab.  The patient did receive infusion of the medication and had no adverse outcomes.  She has no evidence of severe respiratory failure or impending failure or sepsis.  The patient was discharged with recommendations for follow CDC recommendation.    I discussed the patient that the medication Sotrovimab is an emergency use authorization medication.  They understand that it is this point still in recent stages.  They understand the risks of allergic reaction undesirable response to the medication.  The benefits of not taking in the benefits of possible decrease morbidity.  The patient does accept the medication.  FINAL IMPRESSION     1. COVID Active         DISPOSITION:  Patient will be discharged home in stable condition.    FOLLOW UP:  [facility], Emergency Dept  [address]  [place] [id number] [phone]    If symptoms worsen        Electronically signed by: Colin Kat D.O., 2/2/2022 1:14 PM

## 2022-02-02 NOTE — DISCHARGE INSTRUCTIONS
If your symptoms worsen to a point that you become so  short of breath that you can only walk very short distances prior to  fatigue or feel you were unable to manage your symptoms at home please  return to the emergency department. For a more objective approach you can  buy a pulse oximeter online. If your oxygen saturation in these devices is persistently below 90% please return to the ER. In the meantime ensure that you stay well-hydrated and take Tylenol as needed for any pain or fever.     With COVID or COVID type symptoms you will need to remain in home isolate until all four of the following are true:    1. For 5 days since symptoms started and your symptoms are decreasing, then wear a mask for 5 more days.    2. Your symptoms are improving     3. If not improving, 10 days of isolation    3. You have been fever free for at least 24 hours.    Additionally, you could consider taking vitamin C, 500 mg twice a day, as well as Quercetin 250 mg twice a day, Zinc 75 mg per day, melatonin 1 to 3 mg at night, aspirin 81 mg daily, and famotidine 20 mg/day.

## 2022-02-04 ENCOUNTER — TELEPHONE (OUTPATIENT)
Dept: MEDICAL GROUP | Facility: PHYSICIAN GROUP | Age: 87
End: 2022-02-04

## 2022-02-04 NOTE — TELEPHONE ENCOUNTER
Called patient. Patient is unsure of schedule next week due to family circumstances so she did not want to schedule at this time, would like a call next week to schedule appointment instead.

## 2022-02-11 ENCOUNTER — OFFICE VISIT (OUTPATIENT)
Dept: MEDICAL GROUP | Facility: PHYSICIAN GROUP | Age: 87
End: 2022-02-11
Payer: MEDICARE

## 2022-02-11 VITALS
TEMPERATURE: 97.7 F | HEIGHT: 59 IN | WEIGHT: 117.7 LBS | DIASTOLIC BLOOD PRESSURE: 60 MMHG | BODY MASS INDEX: 23.73 KG/M2 | HEART RATE: 63 BPM | RESPIRATION RATE: 14 BRPM | SYSTOLIC BLOOD PRESSURE: 150 MMHG | OXYGEN SATURATION: 97 %

## 2022-02-11 DIAGNOSIS — R10.84 GENERALIZED ABDOMINAL PAIN: ICD-10-CM

## 2022-02-11 PROCEDURE — 99214 OFFICE O/P EST MOD 30 MIN: CPT | Performed by: FAMILY MEDICINE

## 2022-02-11 RX ORDER — HYDROCODONE BITARTRATE AND ACETAMINOPHEN 10; 325 MG/1; MG/1
TABLET ORAL
COMMUNITY
End: 2022-03-10

## 2022-02-11 ASSESSMENT — FIBROSIS 4 INDEX: FIB4 SCORE: 1.95

## 2022-02-11 NOTE — PROGRESS NOTES
Subjective:     CC:   Chief Complaint   Patient presents with   • Follow-Up     Covid        HPI:   Anthony presents today with ED follow up. States she had a visual migraine in the lobby. States her vision had spots in it, but it lasted two minutes and went away.  She recently was in the ED for Covid, on February 2, 2022.  She is still having ongoing fatigue, however she denies shortness of breath, chest pains, fever, nausea, vomiting, or diarrhea.  She is having acute abdominal pain in office today, more so over her right upper quadrant and pain when palpated her liver.  She is also having some generalized epigastric pain.  She did take some medications this morning and has not eaten anything today.  Her  is currently in the hospital due to Covid, he typically manages her medications and currently her daughter is but is unsure how to manage them at this time.  Patient also has dementia and is unsure of what she took this morning.  Discussed that she is not to be taking Excedrin or NSAIDs, as she was taking these long-term and had developed some abdominal pain at the last visit as well.  During a previously previous visit in October she had very similar symptoms with no rebound tenderness or guarding on palpation.  At that time she had taken Excedrin and NoDoz pill on an empty stomach.  Discussed constipation protocol at that time as well and increasing her water intake.  She is unsure when last time she had a bowel movement was, she is on opiate therapy, and not been taking her thyroid medication as prescribed so it is possible that she does have some chronic constipation.  Patient and daughter are agreeable to bring her medications into the clinic next week and meet with a nurse to go over medication management and fill her pill planner and get instructions on this.  She is also willing to get a an ultrasound of her abdomen and lab work done in clinic today to rule out an acute pancreatitis, infection.  No  "problem-specific Assessment & Plan notes found for this encounter.      Current Outpatient Medications Ordered in Epic   Medication Sig Dispense Refill   • tizanidine (ZANAFLEX) 2 MG tablet TAKE ONE TABLET BY MOUTH AT BEDTIME AS NEEDED 30 Tablet 0   • amLODIPine (NORVASC) 10 MG Tab TAKE ONE TABLET BY MOUTH EVERY  Tablet 0   • Calcium Magnesium Zinc 333-133-5 MG Tab Take 1 Tablet by mouth every day.     • ferrous sulfate 325 (65 Fe) MG tablet Take 325 mg by mouth every 7 days.     • famotidine (PEPCID) 10 MG tablet Take 10 mg by mouth every day.     • docusate sodium (COLACE) 100 MG Cap Take 100 mg by mouth every day.     • dorzolamide (TRUSOPT) 2 % Solution Administer 1 Drop into both eyes 2 times a day.     • latanoprost (XALATAN) 0.005 % Solution Administer 1 Drop into both eyes every day.     • Naloxone (NARCAN) 4 MG/0.1ML Liquid One spray in one nostril for overdose and call 911. 1 Each 0   • DULoxetine (CYMBALTA) 60 MG Cap DR Particles delayed-release capsule TAKE ONE CAPSULE BY MOUTH EVERY DAY 90 Capsule 3   • lisinopril (PRINIVIL) 20 MG Tab TAKE ONE TABLET BY MOUTH TWICE A  Tablet 3   • levothyroxine (SYNTHROID) 50 MCG Tab TAKE ONE TABLET BY MOUTH EVERY MORNING ON AN EMPTY STOMACH 90 tablet 3   • cholecalciferol (D3 MAXIMUM STRENGTH) 5000 UNIT Cap Take 5,000 Units by mouth every day.     • HYDROcodone/acetaminophen (NORCO)  MG Tab hydrocodone 10 mg-acetaminophen 325 mg tablet       No current Epic-ordered facility-administered medications on file.       Health Maintenance: Completed    Objective:     Exam:  /60 (BP Location: Left arm, Patient Position: Sitting, BP Cuff Size: Adult)   Pulse 63   Temp 36.5 °C (97.7 °F) (Temporal)   Resp 14   Ht 1.499 m (4' 11\")   Wt 53.4 kg (117 lb 11.2 oz)   SpO2 97%   BMI 23.77 kg/m²  Body mass index is 23.77 kg/m².    Physical Exam  Vitals reviewed.   Constitutional:       General: She is not in acute distress.     Appearance: Normal " appearance. She is not ill-appearing.   HENT:      Nose: No congestion or rhinorrhea.      Mouth/Throat:      Mouth: Mucous membranes are moist.      Pharynx: Oropharynx is clear.   Eyes:      Conjunctiva/sclera: Conjunctivae normal.      Pupils: Pupils are equal, round, and reactive to light.   Cardiovascular:      Rate and Rhythm: Normal rate and regular rhythm.      Pulses: Normal pulses.      Heart sounds: Normal heart sounds. No murmur heard.      Pulmonary:      Effort: Pulmonary effort is normal. No respiratory distress.      Breath sounds: Normal breath sounds. No stridor. No wheezing, rhonchi or rales.   Chest:      Chest wall: No tenderness.   Abdominal:      General: Abdomen is flat. Bowel sounds are normal. There is no distension.      Palpations: There is no mass.      Tenderness: There is abdominal tenderness. There is no right CVA tenderness, left CVA tenderness, guarding or rebound.      Hernia: No hernia is present.   Musculoskeletal:      Right lower leg: No edema.      Left lower leg: No edema.   Skin:     Coloration: Skin is pale.   Neurological:      Mental Status: She is alert. Mental status is at baseline.   Psychiatric:         Mood and Affect: Mood normal.          A chaperone was offered to the patient during today's exam. Patient declined chaperone.    Assessment & Plan:     89 y.o. female with the following -     1. Generalized abdominal pain  Chronic, at her previous visit in October she also had complaints of generalized abdominal pain.  She does have docusate sodium on her medication list as well as famotidine.  She is unsure if she is taking these regularly and she is unsure of her last bowel movement.  Discussed with her daughter bowel regimen protocol to help prevent constipation as she as is on chronic opiate therapy.  Ultrasound of abdomen scheduled for 2/15/2022. Patient declined having labs done in clinic as she waited for over 35 minutes and decided to get them done when she  goes to the hospital to visit her . She will also come back next week with her daughter to go over her medication regimen with the nurse and bring a pill planner as well.  We will follow up in 1 month or sooner if symptoms do not improve.  Also discussed with daughter if wants her medications are more organized and she is taking them as prescribed, and she is taking her medications with food and her abdominal pain still persists with all test being normal we will discuss possibly prescribing Carafate to help with her abdominal pain.  Declines wanting to go to GI at this time as she is 89 and they do not feel an endoscopy or colonoscopy would be beneficial. ED precautions given.  - US-ABDOMEN COMPLETE SURVEY; Future    Other orders  - HYDROcodone/acetaminophen (NORCO)  MG Tab; hydrocodone 10 mg-acetaminophen 325 mg tablet       I spent a total of  minutes with record review, exam, communication with the patient, communication with other providers, and documentation of this encounter.      Return in about 1 month (around 3/11/2022) for F/U labs.    Please note that this dictation was created using voice recognition software. I have made every reasonable attempt to correct obvious errors, but I expect that there are errors of grammar and possibly content that I did not discover before finalizing the note.

## 2022-02-14 ENCOUNTER — TELEPHONE (OUTPATIENT)
Dept: MEDICAL GROUP | Facility: PHYSICIAN GROUP | Age: 87
End: 2022-02-14

## 2022-02-15 ENCOUNTER — HOSPITAL ENCOUNTER (OUTPATIENT)
Dept: RADIOLOGY | Facility: MEDICAL CENTER | Age: 87
End: 2022-02-15
Attending: FAMILY MEDICINE
Payer: MEDICARE

## 2022-02-15 ENCOUNTER — HOSPITAL ENCOUNTER (OUTPATIENT)
Dept: LAB | Facility: MEDICAL CENTER | Age: 87
End: 2022-02-15
Attending: FAMILY MEDICINE
Payer: MEDICARE

## 2022-02-15 DIAGNOSIS — R10.84 GENERALIZED ABDOMINAL PAIN: ICD-10-CM

## 2022-02-15 LAB
ALBUMIN SERPL BCP-MCNC: 4.2 G/DL (ref 3.2–4.9)
ALBUMIN/GLOB SERPL: 1.4 G/DL
ALP SERPL-CCNC: 70 U/L (ref 30–99)
ALT SERPL-CCNC: 8 U/L (ref 2–50)
AMYLASE SERPL-CCNC: 59 U/L (ref 20–103)
ANION GAP SERPL CALC-SCNC: 12 MMOL/L (ref 7–16)
AST SERPL-CCNC: 18 U/L (ref 12–45)
BILIRUB SERPL-MCNC: 0.2 MG/DL (ref 0.1–1.5)
BUN SERPL-MCNC: 22 MG/DL (ref 8–22)
CALCIUM SERPL-MCNC: 9.2 MG/DL (ref 8.5–10.5)
CHLORIDE SERPL-SCNC: 107 MMOL/L (ref 96–112)
CO2 SERPL-SCNC: 23 MMOL/L (ref 20–33)
CREAT SERPL-MCNC: 1.17 MG/DL (ref 0.5–1.4)
ERYTHROCYTE [DISTWIDTH] IN BLOOD BY AUTOMATED COUNT: 51.4 FL (ref 35.9–50)
GLOBULIN SER CALC-MCNC: 2.9 G/DL (ref 1.9–3.5)
GLUCOSE SERPL-MCNC: 84 MG/DL (ref 65–99)
HCT VFR BLD AUTO: 36.2 % (ref 37–47)
HGB BLD-MCNC: 11.3 G/DL (ref 12–16)
LIPASE SERPL-CCNC: 27 U/L (ref 11–82)
MCH RBC QN AUTO: 30.7 PG (ref 27–33)
MCHC RBC AUTO-ENTMCNC: 31.2 G/DL (ref 33.6–35)
MCV RBC AUTO: 98.4 FL (ref 81.4–97.8)
PLATELET # BLD AUTO: 383 K/UL (ref 164–446)
PMV BLD AUTO: 10.2 FL (ref 9–12.9)
POTASSIUM SERPL-SCNC: 4.1 MMOL/L (ref 3.6–5.5)
PROT SERPL-MCNC: 7.1 G/DL (ref 6–8.2)
RBC # BLD AUTO: 3.68 M/UL (ref 4.2–5.4)
SODIUM SERPL-SCNC: 142 MMOL/L (ref 135–145)
TSH SERPL DL<=0.005 MIU/L-ACNC: 2.76 UIU/ML (ref 0.38–5.33)
WBC # BLD AUTO: 5.7 K/UL (ref 4.8–10.8)

## 2022-02-15 PROCEDURE — 83690 ASSAY OF LIPASE: CPT

## 2022-02-15 PROCEDURE — 36415 COLL VENOUS BLD VENIPUNCTURE: CPT

## 2022-02-15 PROCEDURE — 76700 US EXAM ABDOM COMPLETE: CPT

## 2022-02-15 PROCEDURE — 80053 COMPREHEN METABOLIC PANEL: CPT

## 2022-02-15 PROCEDURE — 82150 ASSAY OF AMYLASE: CPT

## 2022-02-15 PROCEDURE — 84443 ASSAY THYROID STIM HORMONE: CPT

## 2022-02-15 PROCEDURE — 85027 COMPLETE CBC AUTOMATED: CPT

## 2022-02-15 NOTE — TELEPHONE ENCOUNTER
Called pt to discuss lab work that was ordered on Friday, 2/11/22. Asked about abdominal pain- pt said she couldn't compare pain today to last Friday because she cannot remember last Friday. Daughter (rayray) wasn't at home so I told Anthony I would call back and speak with Rayray.     Called Rayray, Rayray will have labs completed tomorrow when they have the US completed. Gave Rayray the US prep instructions.     Rayray also has my direct extension, Rayray will contact me when she has some time to review Anthony's medication list/dosages.  Updated Colin, APRN.

## 2022-03-10 ENCOUNTER — OFFICE VISIT (OUTPATIENT)
Dept: MEDICAL GROUP | Facility: PHYSICIAN GROUP | Age: 87
End: 2022-03-10
Payer: MEDICARE

## 2022-03-10 VITALS
DIASTOLIC BLOOD PRESSURE: 58 MMHG | SYSTOLIC BLOOD PRESSURE: 116 MMHG | HEART RATE: 76 BPM | OXYGEN SATURATION: 96 % | WEIGHT: 118 LBS | HEIGHT: 59 IN | RESPIRATION RATE: 16 BRPM | TEMPERATURE: 97.6 F | BODY MASS INDEX: 23.79 KG/M2

## 2022-03-10 DIAGNOSIS — M79.7 FIBROMYALGIA: ICD-10-CM

## 2022-03-10 DIAGNOSIS — F33.42 RECURRENT MAJOR DEPRESSIVE DISORDER, IN FULL REMISSION (HCC): ICD-10-CM

## 2022-03-10 DIAGNOSIS — N18.30 STAGE 3 CHRONIC KIDNEY DISEASE, UNSPECIFIED WHETHER STAGE 3A OR 3B CKD: ICD-10-CM

## 2022-03-10 DIAGNOSIS — F11.20 OPIOID TYPE DEPENDENCE, CONTINUOUS (HCC): ICD-10-CM

## 2022-03-10 DIAGNOSIS — Z89.422 HX OF AMPUTATION OF LESSER TOE, LEFT (HCC): ICD-10-CM

## 2022-03-10 PROCEDURE — 99215 OFFICE O/P EST HI 40 MIN: CPT | Performed by: FAMILY MEDICINE

## 2022-03-10 RX ORDER — HYDROCODONE BITARTRATE AND ACETAMINOPHEN 7.5; 325 MG/1; MG/1
1 TABLET ORAL EVERY 8 HOURS PRN
Qty: 90 TABLET | Refills: 0 | Status: SHIPPED | OUTPATIENT
Start: 2022-03-10 | End: 2022-04-09

## 2022-03-10 RX ORDER — HYDROCODONE BITARTRATE AND ACETAMINOPHEN 7.5; 325 MG/1; MG/1
1 TABLET ORAL EVERY 8 HOURS PRN
Qty: 90 TABLET | Refills: 0 | Status: SHIPPED | OUTPATIENT
Start: 2022-04-10 | End: 2022-05-10

## 2022-03-10 ASSESSMENT — FIBROSIS 4 INDEX: FIB4 SCORE: 1.5

## 2022-03-10 NOTE — ASSESSMENT & PLAN NOTE
Chronic, she was taking excessive amounts of of Excedrin for many years.  Discussed with her family to take the Excedrin away so she cannot take it whenever she feels like it due to her memory issues she was unsure how much she was taking, possibly around 1000 mg daily for pain control.  Most recent GFR 43 in February 2022.  Continue to discuss avoiding NSAIDs, increasing fluids, patient declines wanting to go back to nephrology at this time.

## 2022-03-10 NOTE — ASSESSMENT & PLAN NOTE
Chronic, she was previously going to [facility] pain management but they were no longer contracted with her current [employer] insurance.  I then referred her to physiatry where she was being seen for pain management by Dr. Su.  She is refusing to go back to physiatry at this time due to the co-pay and she felt that she had a visit that ended abruptly and felt that it was because she was not Covid vaccinated.  Pain contracted today in clinic, refill of Norco 7.5-3 25 mg 1 tablet every 8 hours as needed.  She is making a 1 month prescription last around 60 days.  Discussed taking the Norco instead of Excedrin for her pain due to her decreased kidney function.  Her pain is mostly in her hips, lower back, and states she has a diagnosis of fibromyalgia.

## 2022-03-10 NOTE — ASSESSMENT & PLAN NOTE
Patient declines feeling depressed, states that she is currently taking Cymbalta 60 mg for depression which is effective. Has had depression in past and tried Buproprion which was ineffective.  Declines any depression at today's visit.  PHQ-9 0.

## 2022-03-10 NOTE — PROGRESS NOTES
Subjective:     CC:   Chief Complaint   Patient presents with   • Results     02/11/22       HPI:   Anthony presents today with lab result follow-up.  Discussed all of her labs are reassuring and were trending in the right direction.  Her kidney function was still decreased however improved from previous result.  Continue to discuss with daughter and  to keep her Excedrin out of sight and continue to increase her fluid intake.  Also discussed she continues to be chronically anemic, she is taking an iron supplement once a week as it is constipating for her.  Her  continues to be in control of her medication management as she has memory loss due to an unknown cause.  She likely has some form of dementia however she refuses to go to neurology at this time.  She denies any recent abdominal pain, and states that she her daughter is making sure that she takes her medication with her food.    CKD (chronic kidney disease) stage 3, GFR 30-59 ml/min (HCC)  Chronic, she was taking excessive amounts of of Excedrin for many years.  Discussed with her family to take the Excedrin away so she cannot take it whenever she feels like it due to her memory issues she was unsure how much she was taking, possibly around 1000 mg daily for pain control.  Most recent GFR 43 in February 2022.  Continue to discuss avoiding NSAIDs, increasing fluids, patient declines wanting to go back to nephrology at this time.    Opioid type dependence, continuous (HCC)  Chronic, she was previously going to [facility] pain management but they were no longer contracted with her current [employer] insurance.  I then referred her to physiatry where she was being seen for pain management by Dr. Su.  She is refusing to go back to physiatry at this time due to the co-pay and she felt that she had a visit that ended abruptly and felt that it was because she was not Covid vaccinated.  Pain contracted today in clinic, refill of [place] 7.5-3 25  mg 1 tablet every 8 hours as needed.  She is making a 1 month prescription last around 60 days.  Discussed taking the Norco instead of Excedrin for her pain due to her decreased kidney function.  Her pain is mostly in her hips, lower back, and states she has a diagnosis of fibromyalgia.    Recurrent major depressive disorder, in full remission (HCC)  Patient declines feeling depressed, states that she is currently taking Cymbalta 60 mg for depression which is effective. Has had depression in past and tried Buproprion which was ineffective.  Declines any depression at today's visit.  PHQ-9 0.    Hx of amputation of lesser toe, left (HCC)  Hx of second left toe amputated due to dislocation of second left toe with increased pressure and pain. Amputated by Dr. Nelson in 2015.       Current Outpatient Medications Ordered in Epic   Medication Sig Dispense Refill   • [START ON 4/10/2022] HYDROcodone-acetaminophen (NORCO) 7.5-325 MG per tablet Take 1 Tablet by mouth every 8 hours as needed for Severe Pain for up to 30 days. 90 Tablet 0   • HYDROcodone-acetaminophen (NORCO) 7.5-325 MG per tablet Take 1 Tablet by mouth every 8 hours as needed for Severe Pain for up to 30 days. 90 Tablet 0   • tizanidine (ZANAFLEX) 2 MG tablet TAKE ONE TABLET BY MOUTH AT BEDTIME AS NEEDED 30 Tablet 0   • amLODIPine (NORVASC) 10 MG Tab TAKE ONE TABLET BY MOUTH EVERY  Tablet 0   • Calcium Magnesium Zinc 333-133-5 MG Tab Take 1 Tablet by mouth every day.     • ferrous sulfate 325 (65 Fe) MG tablet Take 325 mg by mouth every 7 days.     • famotidine (PEPCID) 10 MG tablet Take 10 mg by mouth every day.     • docusate sodium (COLACE) 100 MG Cap Take 100 mg by mouth every day.     • dorzolamide (TRUSOPT) 2 % Solution Administer 1 Drop into both eyes 2 times a day.     • latanoprost (XALATAN) 0.005 % Solution Administer 1 Drop into both eyes every day.     • Naloxone (NARCAN) 4 MG/0.1ML Liquid One spray in one nostril for overdose and call  "911. 1 Each 0   • DULoxetine (CYMBALTA) 60 MG Cap DR Particles delayed-release capsule TAKE ONE CAPSULE BY MOUTH EVERY DAY 90 Capsule 3   • lisinopril (PRINIVIL) 20 MG Tab TAKE ONE TABLET BY MOUTH TWICE A  Tablet 3   • levothyroxine (SYNTHROID) 50 MCG Tab TAKE ONE TABLET BY MOUTH EVERY MORNING ON AN EMPTY STOMACH 90 tablet 3   • cholecalciferol (VITAMIN D3) 5000 UNIT Cap Take 5,000 Units by mouth every day.       No current [facility]-ordered facility-administered medications on file.       Health Maintenance: Completed        Objective:     Exam:  /58 (BP Location: Left arm, Patient Position: Sitting, BP Cuff Size: Adult)   Pulse 76   Temp 36.4 °C (97.6 °F) (Temporal)   Resp 16   Ht 1.499 m (4' 11\")   Wt 53.5 kg (118 lb)   SpO2 96%   BMI 23.83 kg/m²  Body mass index is 23.83 kg/m².    Physical Exam  Vitals reviewed.   Constitutional:       General: She is not in acute distress.     Appearance: Normal appearance. She is not ill-appearing.   HENT:      Mouth/Throat:      Mouth: Mucous membranes are moist.      Pharynx: Oropharynx is clear.   Eyes:      Conjunctiva/sclera: Conjunctivae normal.      Pupils: Pupils are equal, round, and reactive to light.   Cardiovascular:      Rate and Rhythm: Normal rate.      Heart sounds: Murmur heard.   Pulmonary:      Effort: Pulmonary effort is normal. No respiratory distress.      Breath sounds: Normal breath sounds. No stridor. No wheezing, rhonchi or rales.   Chest:      Chest wall: No tenderness.   Abdominal:      General: Abdomen is flat. Bowel sounds are normal. There is no distension.      Tenderness: There is no abdominal tenderness. There is no guarding.   Musculoskeletal:      Right lower leg: No edema.      Left lower leg: No edema.   Neurological:      Mental Status: She is alert. Mental status is at baseline.   Psychiatric:         Mood and Affect: Mood normal.         Behavior: Behavior normal.          A chaperone was offered to the patient during " "today's exam. Chaperone name: daughter and  present was present.    Assessment & Plan:     89 y.o. female with the following -     1. Opioid type dependence, continuous (HCC)  Opiate contract done in clinic today. Patient understands this prescription is a controlled substance which is potentially habit-forming and its use is regulated by the [employer]. We also discussed the new \"black box\" warning regarding the lethal combination of opioids and benzodiazepines. Refills are subject to terms of a controlled substance agreement and patient has an updated one on file. Most recent UDS is appropriate. Any refill requires an office visit. Narcotics have may adverse effects and the risks of addiction, accidental overdose and death were emphasized. Provided prescriptions for the next three months.    - HYDROcodone-acetaminophen (NORCO) 7.5-325 MG per tablet; Take 1 Tablet by mouth every 8 hours as needed for Severe Pain for up to 30 days.  Dispense: 90 Tablet; Refill: 0  - HYDROcodone-acetaminophen (NORCO) 7.5-325 MG per tablet; Take 1 Tablet by mouth every 8 hours as needed for Severe Pain for up to 30 days.  Dispense: 90 Tablet; Refill: 0  - Controlled Substance Treatment Agreement    2. Fibromyalgia  - HYDROcodone-acetaminophen (NORCO) 7.5-325 MG per tablet; Take 1 Tablet by mouth every 8 hours as needed for Severe Pain for up to 30 days.  Dispense: 90 Tablet; Refill: 0  - HYDROcodone-acetaminophen (NORCO) 7.5-325 MG per tablet; Take 1 Tablet by mouth every 8 hours as needed for Severe Pain for up to 30 days.  Dispense: 90 Tablet; Refill: 0    3. Stage 3 chronic kidney disease, unspecified whether stage 3a or 3b CKD (HCC)  Chronic, stable.  Discussed increasing fluid and continuing to avoid Excedrin.  4. Recurrent major depressive disorder, in full remission (HCC)  In remission, PHQ-9 0 at today's visit.  Continues with Cymbalta.    I spent a total of 40 minutes with record review, exam, communication with the patient, " communication with other providers, and documentation of this encounter.      Return in about 4 months (around 7/10/2022) for norco refill - telemed .    Please note that this dictation was created using voice recognition software. I have made every reasonable attempt to correct obvious errors, but I expect that there are errors of grammar and possibly content that I did not discover before finalizing the note.    Annual Health Assessment Questions:    1.  Are you currently engaging in any exercise or physical activity? Yes    2.  How would you describe your mood or emotional well-being today? good    3.  Have you had any falls in the last year? No    4.  Have you noticed any problems with your balance or had difficulty walking? No    5.  In the last six months have you experienced any leakage of urine? No    6. DPA/Advanced Directive: Patient does not have an Advanced Directive.  A packet and workshop information was given on Advanced Directives.

## 2022-03-25 PROBLEM — Z89.422 HX OF AMPUTATION OF LESSER TOE, LEFT (HCC): Status: ACTIVE | Noted: 2022-03-25

## 2022-03-25 NOTE — ASSESSMENT & PLAN NOTE
Hx of second left toe amputated due to dislocation of second left toe with increased pressure and pain. Amputated by Dr. Nelson in 2015.

## 2022-05-11 RX ORDER — TIZANIDINE 2 MG/1
TABLET ORAL
Qty: 30 TABLET | Refills: 1 | Status: SHIPPED | OUTPATIENT
Start: 2022-05-11 | End: 2022-09-06

## 2022-05-31 PROBLEM — I70.0 ATHEROSCLEROSIS OF AORTA (HCC): Status: ACTIVE | Noted: 2022-05-31

## 2022-05-31 PROBLEM — I73.9 PAD (PERIPHERAL ARTERY DISEASE) (HCC): Status: ACTIVE | Noted: 2022-05-31

## 2022-05-31 PROBLEM — G31.9 CEREBRAL ATROPHY (HCC): Status: ACTIVE | Noted: 2022-05-31

## 2022-05-31 PROBLEM — E03.8 OTHER SPECIFIED HYPOTHYROIDISM: Status: ACTIVE | Noted: 2018-07-25

## 2022-05-31 PROBLEM — F32.5 MAJOR DEPRESSION IN REMISSION (HCC): Status: ACTIVE | Noted: 2022-05-31

## 2022-05-31 PROBLEM — M46.1 DEGENERATIVE JOINT DISEASE OF SACROILIAC JOINT (HCC): Status: ACTIVE | Noted: 2022-05-31

## 2022-06-23 DIAGNOSIS — I10 ESSENTIAL HYPERTENSION: ICD-10-CM

## 2022-06-23 RX ORDER — AMLODIPINE BESYLATE 10 MG/1
TABLET ORAL
Qty: 100 TABLET | Refills: 3 | Status: SHIPPED | OUTPATIENT
Start: 2022-06-23 | End: 2023-01-01

## 2022-07-05 RX ORDER — LEVOTHYROXINE SODIUM 0.05 MG/1
50 TABLET ORAL
Qty: 100 TABLET | Refills: 3 | Status: SHIPPED | OUTPATIENT
Start: 2022-07-05 | End: 2023-01-01

## 2022-07-11 ENCOUNTER — TELEMEDICINE (OUTPATIENT)
Dept: MEDICAL GROUP | Facility: PHYSICIAN GROUP | Age: 87
End: 2022-07-11
Payer: MEDICARE

## 2022-07-11 VITALS
SYSTOLIC BLOOD PRESSURE: 142 MMHG | HEART RATE: 70 BPM | OXYGEN SATURATION: 95 % | TEMPERATURE: 98.5 F | DIASTOLIC BLOOD PRESSURE: 65 MMHG

## 2022-07-11 DIAGNOSIS — M79.7 FIBROMYALGIA: ICD-10-CM

## 2022-07-11 DIAGNOSIS — R10.84 GENERALIZED ABDOMINAL PAIN: ICD-10-CM

## 2022-07-11 DIAGNOSIS — M46.1 DEGENERATIVE JOINT DISEASE OF SACROILIAC JOINT (HCC): ICD-10-CM

## 2022-07-11 DIAGNOSIS — R41.3 MEMORY LOSS OF UNKNOWN CAUSE: ICD-10-CM

## 2022-07-11 DIAGNOSIS — K21.9 GASTROESOPHAGEAL REFLUX DISEASE WITHOUT ESOPHAGITIS: ICD-10-CM

## 2022-07-11 DIAGNOSIS — F33.42 RECURRENT MAJOR DEPRESSIVE DISORDER, IN FULL REMISSION (HCC): ICD-10-CM

## 2022-07-11 DIAGNOSIS — G31.9 CEREBRAL ATROPHY (HCC): ICD-10-CM

## 2022-07-11 DIAGNOSIS — I10 PRIMARY HYPERTENSION: ICD-10-CM

## 2022-07-11 DIAGNOSIS — N18.32 STAGE 3B CHRONIC KIDNEY DISEASE: ICD-10-CM

## 2022-07-11 DIAGNOSIS — Z00.00 HEALTHCARE MAINTENANCE: ICD-10-CM

## 2022-07-11 DIAGNOSIS — F11.20 OPIOID DEPENDENCE IN CONTROLLED ENVIRONMENT (HCC): ICD-10-CM

## 2022-07-11 PROBLEM — F32.5 MAJOR DEPRESSION IN REMISSION (HCC): Status: RESOLVED | Noted: 2022-05-31 | Resolved: 2022-07-11

## 2022-07-11 PROBLEM — I73.9 PAD (PERIPHERAL ARTERY DISEASE) (HCC): Status: RESOLVED | Noted: 2022-05-31 | Resolved: 2022-07-11

## 2022-07-11 PROCEDURE — 99214 OFFICE O/P EST MOD 30 MIN: CPT | Mod: 95 | Performed by: FAMILY MEDICINE

## 2022-07-11 RX ORDER — HYDROCODONE BITARTRATE AND ACETAMINOPHEN 7.5; 325 MG/1; MG/1
1 TABLET ORAL EVERY 8 HOURS PRN
Qty: 90 TABLET | Refills: 0 | Status: SHIPPED | OUTPATIENT
Start: 2022-07-11 | End: 2022-08-10

## 2022-07-11 RX ORDER — HYDROCODONE BITARTRATE AND ACETAMINOPHEN 7.5; 325 MG/1; MG/1
1 TABLET ORAL EVERY 8 HOURS PRN
Qty: 90 TABLET | Refills: 0 | Status: SHIPPED | OUTPATIENT
Start: 2022-08-11 | End: 2022-09-10

## 2022-07-11 NOTE — ASSESSMENT & PLAN NOTE
Initially her daughter states that she was not feeling well today and she checked her blood pressure at home it was 159/137, states that she rechecked it a little bit later and it was 142/65.  Discussed checking her blood pressure for the next few days and will call in 3 days to follow-up.  Discussed continuing lisinopril 20 mg and amlodipine 10 mg daily and will adjust medications if she continues to have elevations in her blood pressure.

## 2022-07-11 NOTE — ASSESSMENT & PLAN NOTE
Chronic, brain MRI in 2017.  She does have some memory deficits, and feels that her memory is worsening and would like a referral to neurology.  MoCA done within the last year had a score of 22.

## 2022-07-11 NOTE — ASSESSMENT & PLAN NOTE
This was a chronic issue in the past, has improved with no longer taking Excedrin's, caffeine pills, or aspirin on empty stomach.

## 2022-07-11 NOTE — ASSESSMENT & PLAN NOTE
"Chronic, she was previously going to [facility] pain management but they were no longer contracted with her current [employer] insurance.  I then referred her to physiatry where she was being seen for pain management by Dr. Su.  She is refusing to go back to physiatry at this time due to the co-pay Norco 7.5/325, 1 tablet every 8 hours as needed. Patient understands this prescription is a controlled substance which is potentially habit-forming and its use is regulated by the [employer]. We also discussed the new \"black box\" warning regarding the lethal combination of opioids and benzodiazepines. Refills are subject to terms of a controlled substance agreement and patient has an updated one on file. Most recent UDS is appropriate. Any refill requires an office visit. Narcotics have may adverse effects and the risks of addiction, accidental overdose and death were emphasized. Provided prescriptions for the next three months.    "

## 2022-07-11 NOTE — ASSESSMENT & PLAN NOTE
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  She was seeing Dr. Su at physiatry however could not continue to afford co-pays.  I continue to have a controlled substance agreement on file with her and [place] sent to pharmacy.  Most recent Norco prescription was 90 tablets and has lasted her almost 90 days.  Will continue to monitor.

## 2022-07-11 NOTE — ASSESSMENT & PLAN NOTE
Chronic, reports she see's nephrology Dr. Johnson but is wanting to not see specialist for her kidney function. Records requested. States she has been told it is due to taking Excedrin but also continues to take 1000 mg daily for pain control. June 2021 GFR 34, BUN 32, Creatinine 1.44, currently also taking Lisinopril. Most recent GFR 2/2022 at 43. Will get updated labs in 3 months. No longer taking Excedrin, aspirin, and continues to encourage to drink more water.

## 2022-07-11 NOTE — PROGRESS NOTES
Virtual Visit: Established Patient   This visit was conducted via Zoom using secure and encrypted videoconferencing technology. The patient was in a private location in the [facility] [place].    The patient's identity was confirmed and verbal consent was obtained for this virtual visit.    Subjective:   CC:   Chief Complaint   Patient presents with   • Follow-Up     3 month   bp today reported at home 159/137       Antohny Lynn is a 89 y.o. female presenting for evaluation and management of the following. Anthony is accompanied today's visit with her daughter as well as her  via virtual visit.  She currently does not have any concerns and denies any recent falls or changes in her health status.  I  Her  and her family feel like they want a neurology referral now, states that even she has noticed her memory is worsening and although it may not change course of treatment it would give them more answers as to what is going on.  Last MoCA done in clinic within the last year was a score of 22.    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  She was seeing Dr. Su at [facility] however could not continue to afford co-pays.  I continue to have a controlled substance agreement on file with her and [place] sent to pharmacy.  Most recent Norco prescription was 90 tablets and has lasted her almost 90 days.  Will continue to monitor.     Cerebral Atrophy (Hcc)    Chronic, brain MRI in 2017.  She does have some memory deficits, and feels that her memory is worsening and would like a referral to neurology.  MoCA done within the last year had a score of 22.     Opioid Dependence in Controlled Environment (Hcc)    Chronic, she was previously going to [facility] pain management but they were no longer contracted with her current [employer] insurance.  I then referred her to physiatr where she was being seen for pain  "management by Dr. Su.  She is refusing to go back to physiatry at this time due to the co-pay Norco 7.5/325, 1 tablet every 8 hours as needed. Patient understands this prescription is a controlled substance which is potentially habit-forming and its use is regulated by the [employer]. We also discussed the new \"black box\" warning regarding the lethal combination of opioids and benzodiazepines. Refills are subject to terms of a controlled substance agreement and patient has an updated one on file. Most recent UDS is appropriate. Any refill requires an office visit. Narcotics have may adverse effects and the risks of addiction, accidental overdose and death were emphasized. Provided prescriptions for the next three months.       Stage 3b Chronic Kidney Disease (Hcc)    Chronic, reports she see's nephrology Dr. Johnson but is wanting to not see specialist for her kidney function. Records requested. States she has been told it is due to taking Excedrin but also continues to take 1000 mg daily for pain control. June 2021 GFR 34, BUN 32, Creatinine 1.44, currently also taking Lisinopril. Will review records once obtained from Nephrology     Recurrent Major Depressive Disorder, in Full Remission (Hcc)    Patient declines feeling depressed, states that she is currently taking Cymbalta 60 mg for depression which is effective. Has had depression in past and tried Buproprion which was ineffective.      Primary Hypertension    Initially her daughter states that she was not feeling well today and she checked her blood pressure at home it was 159/137, states that she rechecked it a little bit later and it was 142/65.  Discussed checking her blood pressure for the next few days and will call in 3 days to follow-up.  Discussed continuing lisinopril 20 mg and amlodipine 10 mg daily and will adjust medications if she continues to have elevations in her blood pressure.     Gerd (Gastroesophageal Reflux Disease)   Major Depression in " Remission (Hcc) (Resolved)   Pad (Peripheral Artery Disease) (Hcc) (Resolved)   Generalized Abdominal Pain (Resolved)   Renal Insufficiency (Resolved)        Current Outpatient Medications   Medication Sig Dispense Refill   • HYDROcodone-acetaminophen (NORCO) 7.5-325 MG tab Take 1 Tablet by mouth every 8 hours as needed for Severe Pain for up to 30 days. 90 Tablet 0   • [START ON 8/11/2022] HYDROcodone-acetaminophen (NORCO) 7.5-325 MG tab Take 1 Tablet by mouth every 8 hours as needed for Severe Pain for up to 30 days. 90 Tablet 0   • levothyroxine (SYNTHROID) 50 MCG Tab Take 1 Tablet by mouth every morning on an empty stomach. 100 Tablet 3   • amLODIPine (NORVASC) 10 MG Tab TAKE ONE TABLET BY MOUTH EVERY  Tablet 3   • tizanidine (ZANAFLEX) 2 MG tablet TAKE ONE TABLET BY MOUTH AT BEDTIME AS NEEDED 30 Tablet 1   • Calcium Magnesium Zinc 333-133-5 MG Tab Take 1 Tablet by mouth every day.     • ferrous sulfate 325 (65 Fe) MG tablet Take 325 mg by mouth every 7 days.     • famotidine (PEPCID) 10 MG tablet Take 10 mg by mouth every day.     • docusate sodium (COLACE) 100 MG Cap Take 100 mg by mouth every day.     • dorzolamide (TRUSOPT) 2 % Solution Administer 1 Drop into both eyes 2 times a day.     • latanoprost (XALATAN) 0.005 % Solution Administer 1 Drop into both eyes every day.     • Naloxone (NARCAN) 4 MG/0.1ML Liquid One spray in one nostril for overdose and call 911. 1 Each 0   • DULoxetine (CYMBALTA) 60 MG Cap DR Particles delayed-release capsule TAKE ONE CAPSULE BY MOUTH EVERY DAY 90 Capsule 3   • lisinopril (PRINIVIL) 20 MG Tab TAKE ONE TABLET BY MOUTH TWICE A  Tablet 3   • cholecalciferol (VITAMIN D3) 5000 UNIT Cap Take 5,000 Units by mouth every day.       No current facility-administered medications for this visit.          Objective:   BP (!) 142/65   Pulse 70   Temp 36.9 °C (98.5 °F) (Temporal)   SpO2 95%       Physical Exam  Vitals reviewed.   Constitutional:       General: She is not  "in acute distress.     Appearance: Normal appearance.   Eyes:      Conjunctiva/sclera: Conjunctivae normal.   Cardiovascular:      Rate and Rhythm: Normal rate.   Pulmonary:      Effort: Pulmonary effort is normal.   Neurological:      Mental Status: She is alert. Mental status is at baseline.   Psychiatric:         Mood and Affect: Mood normal.         Behavior: Behavior normal.            Assessment and Plan:   Anthony is a 89 y.o. female and the following treatment plan was discussed:     1. Memory loss of unknown cause  Chronic, patient feels like her memory is worsening and sometimes feels like she \"goes into an alternate dimension \"would like a referral to neurology at this time.  - Referral to Neurology    2. Fibromyalgia  - HYDROcodone-acetaminophen (NORCO) 7.5-325 MG tab; Take 1 Tablet by mouth every 8 hours as needed for Severe Pain for up to 30 days.  Dispense: 90 Tablet; Refill: 0  - HYDROcodone-acetaminophen (NORCO) 7.5-325 MG tab; Take 1 Tablet by mouth every 8 hours as needed for Severe Pain for up to 30 days.  Dispense: 90 Tablet; Refill: 0    3. Opioid dependence in controlled environment (HCC)  Chronic, patient is aware of the risks of taking opiate therapy however now that she is on opiate therapy her pain is more controlled and she has stopped taking Excedrin and aspirin which was further declining her kidney function.  Patient aware of increased somnolence or CNS depression and does not take other CNS depressants or alcohol with this medication.  - HYDROcodone-acetaminophen (NORCO) 7.5-325 MG tab; Take 1 Tablet by mouth every 8 hours as needed for Severe Pain for up to 30 days.  Dispense: 90 Tablet; Refill: 0  - HYDROcodone-acetaminophen (NORCO) 7.5-325 MG tab; Take 1 Tablet by mouth every 8 hours as needed for Severe Pain for up to 30 days.  Dispense: 90 Tablet; Refill: 0    4. Healthcare maintenance  - Lipid Profile; Future  - CBC WITH DIFFERENTIAL; Future  - VITAMIN D,25 HYDROXY; Future  - Comp " Metabolic Panel; Future  - TSH WITH REFLEX TO FT4; Future    5. Recurrent major depressive disorder, in full remission (HCC)  Chronic, currently in remission.  6. Gastroesophageal reflux disease without esophagitis  Chronic, Stable.  7. Generalized abdominal pain  Resolved at this time with the cessation of Excedrin, caffeine pills, and eating regular meals.  8. Cerebral atrophy (HCC)  Chronic, unstable.  Referral to neurology placed.  9. 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)  Chronic, stable on current medication regimen.  10. Primary hypertension  Chronic, unstable.  BP elevated at home and at recheck it was 142/65, daughter will continue to check her blood pressure over the next few days and discussed if elevated where diastolic was over 100 and she was symptomatic to please go to the ER.  If BP remains slightly elevated we will adjust her medication regimen.  11. Stage 3b chronic kidney disease (HCC)  Chronic, stable.      Follow-up: No follow-ups on file.         MUNA Damon  [facility]

## 2022-07-14 ENCOUNTER — TELEPHONE (OUTPATIENT)
Dept: MEDICAL GROUP | Facility: PHYSICIAN GROUP | Age: 87
End: 2022-07-14
Payer: MEDICARE

## 2022-07-14 ENCOUNTER — PATIENT OUTREACH (OUTPATIENT)
Dept: HEALTH INFORMATION MANAGEMENT | Facility: OTHER | Age: 87
End: 2022-07-14
Payer: MEDICARE

## 2022-07-14 NOTE — TELEPHONE ENCOUNTER
Patients  (MARY BETH) [phone] called and lvm stating that his Wife's b/p is 76/41. Called the patients  back immediately and he stated that she is feeling a little woosy. Advised patients  to take patient to UC or ER due to not having any openings in office today and the need for the patient to be evaluated. Patients  Mary Beth also stated that she had not taken her b/p med today. Mary Beth agreed to take her to get evaluated

## 2022-07-14 NOTE — PROGRESS NOTES
7/14/2022   CHW attempted to introduce CCM program to patient. Patient's spouse, Keith (CHW spoke with earlier to intro program, stated to call back in a week. CHW will follow up with both patient and spouse then.

## 2022-07-15 NOTE — TELEPHONE ENCOUNTER
I reviewed the chart and did not see that the patient was taken for evaluation within the [facility] system.  Please call and make sure they did take her somewhere to be seen as her symptoms sound very worrisome.

## 2022-07-21 ENCOUNTER — TELEPHONE (OUTPATIENT)
Dept: MEDICAL GROUP | Facility: PHYSICIAN GROUP | Age: 87
End: 2022-07-21
Payer: MEDICARE

## 2022-07-21 ENCOUNTER — PATIENT OUTREACH (OUTPATIENT)
Dept: HEALTH INFORMATION MANAGEMENT | Facility: OTHER | Age: 87
End: 2022-07-21
Payer: MEDICARE

## 2022-07-21 VITALS — SYSTOLIC BLOOD PRESSURE: 108 MMHG | DIASTOLIC BLOOD PRESSURE: 63 MMHG

## 2022-07-21 SDOH — ECONOMIC STABILITY: TRANSPORTATION INSECURITY
IN THE PAST 12 MONTHS, HAS LACK OF TRANSPORTATION KEPT YOU FROM MEETINGS, WORK, OR FROM GETTING THINGS NEEDED FOR DAILY LIVING?: NO

## 2022-07-21 SDOH — ECONOMIC STABILITY: TRANSPORTATION INSECURITY
IN THE PAST 12 MONTHS, HAS THE LACK OF TRANSPORTATION KEPT YOU FROM MEDICAL APPOINTMENTS OR FROM GETTING MEDICATIONS?: NO

## 2022-07-21 ASSESSMENT — SOCIAL DETERMINANTS OF HEALTH (SDOH)
HOW HARD IS IT FOR YOU TO PAY FOR THE VERY BASICS LIKE FOOD, HOUSING, MEDICAL CARE, AND HEATING?: NOT VERY HARD
HOW OFTEN DO YOU ATTENT MEETINGS OF THE CLUB OR ORGANIZATION YOU BELONG TO?: NEVER
DO YOU BELONG TO ANY CLUBS OR ORGANIZATIONS SUCH AS CHURCH GROUPS UNIONS, FRATERNAL OR ATHLETIC GROUPS, OR SCHOOL GROUPS?: NO
IN A TYPICAL WEEK, HOW MANY TIMES DO YOU TALK ON THE PHONE WITH FAMILY, FRIENDS, OR NEIGHBORS?: THREE TIMES A WEEK
HOW OFTEN DO YOU GET TOGETHER WITH FRIENDS OR RELATIVES?: TWICE A WEEK
HOW OFTEN DO YOU ATTEND CHURCH OR RELIGIOUS SERVICES?: NEVER

## 2022-07-21 NOTE — TELEPHONE ENCOUNTER
Phone Number Called: [phone] (home)       Call outcome: Spoke to Keith    Message:  informed me that pt's BP has been improving. Her last recorded from the night previous was 108/63.

## 2022-07-21 NOTE — TELEPHONE ENCOUNTER
----- Message from LOGAN Dallas sent at 7/20/2022  3:12 PM PDT -----  Regarding: FW: call for BP check  Can someone please call and see what Tacy's blood pressures have been?  Thank you  ----- Message -----  From: LOGAN Dallas  Sent: 7/14/2022  12:00 AM PDT  To: Background Patient List Reminders  Subject: call for BP check

## 2022-07-21 NOTE — PROGRESS NOTES
7/21/2022  CCM Community Health Worker Intake    • Social determinates of health intake completed with -elizabeth.   • Has PCP appointment scheduled for 10/20@12:40  • Scheduled CHW Follow-Up: RN will complete enrollment  o Type of Follow-Up (phone, virtual, clinic, home): call  • Case Management General Assessment in Epic completed: yes    CHW completed SDOH with  of 46years, Elizabeth. Patient and  have an active vehicle, patient's  Elizabeth is still an active . Patient has family in the area and sees and talks to them often. Per Elizabeth, patient has had a couple of falls the last few months. Patient and daughter tend to the house with an assist from a  once a week.      Action Plan: RN Care Coordinator Tiara to complete enrollment

## 2022-09-06 RX ORDER — TIZANIDINE 2 MG/1
TABLET ORAL
Qty: 30 TABLET | Refills: 0 | Status: SHIPPED | OUTPATIENT
Start: 2022-09-06 | End: 2022-10-26

## 2022-10-20 ENCOUNTER — APPOINTMENT (OUTPATIENT)
Dept: MEDICAL GROUP | Facility: PHYSICIAN GROUP | Age: 87
End: 2022-10-20
Payer: MEDICARE

## 2022-10-24 ENCOUNTER — OFFICE VISIT (OUTPATIENT)
Dept: MEDICAL GROUP | Facility: PHYSICIAN GROUP | Age: 87
End: 2022-10-24
Payer: MEDICARE

## 2022-10-24 VITALS
BODY MASS INDEX: 23.16 KG/M2 | TEMPERATURE: 97.9 F | OXYGEN SATURATION: 96 % | HEART RATE: 67 BPM | RESPIRATION RATE: 16 BRPM | SYSTOLIC BLOOD PRESSURE: 118 MMHG | DIASTOLIC BLOOD PRESSURE: 60 MMHG | HEIGHT: 60 IN | WEIGHT: 118 LBS

## 2022-10-24 DIAGNOSIS — N18.32 STAGE 3B CHRONIC KIDNEY DISEASE: ICD-10-CM

## 2022-10-24 DIAGNOSIS — M79.7 FIBROMYALGIA: ICD-10-CM

## 2022-10-24 DIAGNOSIS — F11.20 UNCOMPLICATED OPIOID DEPENDENCE (HCC): ICD-10-CM

## 2022-10-24 PROCEDURE — 99214 OFFICE O/P EST MOD 30 MIN: CPT | Performed by: FAMILY MEDICINE

## 2022-10-24 RX ORDER — HYDROCODONE BITARTRATE AND ACETAMINOPHEN 7.5; 325 MG/1; MG/1
1 TABLET ORAL EVERY 8 HOURS PRN
Qty: 90 TABLET | Refills: 0 | Status: SHIPPED | OUTPATIENT
Start: 2022-01-01 | End: 2022-01-01

## 2022-10-24 RX ORDER — HYDROCODONE BITARTRATE AND ACETAMINOPHEN 7.5; 325 MG/1; MG/1
1 TABLET ORAL EVERY 8 HOURS PRN
Qty: 90 TABLET | Refills: 0 | Status: SHIPPED | OUTPATIENT
Start: 2022-10-24 | End: 2022-01-01

## 2022-10-24 ASSESSMENT — FIBROSIS 4 INDEX: FIB4 SCORE: 1.51

## 2022-10-24 NOTE — ASSESSMENT & PLAN NOTE
Chronic, continue to discuss no longer taking Excedrin or aspirin for pain control.  Repeat labs ordered and she will get done before her next appointment.  Continues on lisinopril 20 mg daily.  We will continue to monitor

## 2022-10-24 NOTE — PROGRESS NOTES
"Subjective:     CC:   Chief Complaint   Patient presents with    Medication Refill     Hydrocodone        HPI:   Anthony presents today with lab work follow up however miscommunication on when labs were due. Will get done next week.   Denies any recent falls, daughter still involved in care.     Fibromyalgia  Chronic, stable on Norco as needed for increased pain.  She also takes duloxetine daily.    Stage 3b chronic kidney disease (HCC)  Chronic, continue to discuss no longer taking Excedrin or aspirin for pain control.  Repeat labs ordered and she will get done before her next appointment.  Continues on lisinopril 20 mg daily.  We will continue to monitor    Uncomplicated opioid dependence (HCC)  Chronic, she was previously going to [facility] pain management but they were no longer contracted with her current [employer] insurance.  I then referred her to physiatry where she was being seen for pain management by Dr. Su.  She is refusing to go back to physiatry at this time due to the co-pay Norco 7.5/325, 1 tablet every 8 hours as needed. Patient understands this prescription is a controlled substance which is potentially habit-forming and its use is regulated by the [employer]. We also discussed the new \"black box\" warning regarding the lethal combination of opioids and benzodiazepines. Refills are subject to terms of a controlled substance agreement and patient has an updated one on file. Most recent UDS is appropriate. Any refill requires an office visit. Narcotics have may adverse effects and the risks of addiction, accidental overdose and death were emphasized. Provided prescriptions for the next three months.      Current Outpatient Medications Ordered in Epic   Medication Sig Dispense Refill    HYDROcodone-acetaminophen (NORCO) 7.5-325 MG tab Take 1 Tablet by mouth every 8 hours as needed for Severe Pain for up to 30 days. 90 Tablet 0    GINSENG CHINESE RED PO Take 1 Capsule by mouth every day. Mental " clarity supplement      [START ON 11/23/2022] HYDROcodone-acetaminophen (NORCO) 7.5-325 MG tab Take 1 Tablet by mouth every 8 hours as needed for Severe Pain for up to 30 days. 90 Tablet 0    tizanidine (ZANAFLEX) 2 MG tablet TAKE ONE TABLET BY MOUTH AT BEDTIME AS NEEDED 30 Tablet 0    levothyroxine (SYNTHROID) 50 MCG Tab Take 1 Tablet by mouth every morning on an empty stomach. 100 Tablet 3    amLODIPine (NORVASC) 10 MG Tab TAKE ONE TABLET BY MOUTH EVERY  Tablet 3    Calcium Magnesium Zinc 333-133-5 MG Tab Take 1 Tablet by mouth every day.      ferrous sulfate 325 (65 Fe) MG tablet Take 325 mg by mouth every 7 days.      famotidine (PEPCID) 10 MG tablet Take 10 mg by mouth every day.      docusate sodium (COLACE) 100 MG Cap Take 100 mg by mouth every day.      dorzolamide (TRUSOPT) 2 % Solution Administer 1 Drop into both eyes 2 times a day.      latanoprost (XALATAN) 0.005 % Solution Administer 1 Drop into both eyes every day.      Naloxone (NARCAN) 4 MG/0.1ML Liquid One spray in one nostril for overdose and call 911. 1 Each 0    DULoxetine (CYMBALTA) 60 MG Cap DR Particles delayed-release capsule TAKE ONE CAPSULE BY MOUTH EVERY DAY 90 Capsule 3    lisinopril (PRINIVIL) 20 MG Tab TAKE ONE TABLET BY MOUTH TWICE A  Tablet 3    cholecalciferol (VITAMIN D3) 5000 UNIT Cap Take 5,000 Units by mouth every day.       No current [facility]-ordered facility-administered medications on file.       Health Maintenance: Completed        Objective:     Exam:  /60 (BP Location: Right arm, Patient Position: Sitting, BP Cuff Size: Adult)   Pulse 67   Temp 36.6 °C (97.9 °F) (Temporal)   Resp 16   Ht 1.524 m (5')   Wt 53.5 kg (118 lb)   SpO2 96%   BMI 23.05 kg/m²  Body mass index is 23.05 kg/m².    Physical Exam  Vitals reviewed.   Constitutional:       Appearance: Normal appearance.   HENT:      Mouth/Throat:      Mouth: Mucous membranes are moist.      Pharynx: Oropharynx is clear.   Eyes:       Conjunctiva/sclera: Conjunctivae normal.      Pupils: Pupils are equal, round, and reactive to light.   Cardiovascular:      Rate and Rhythm: Normal rate and regular rhythm.      Pulses: Normal pulses.      Heart sounds: Murmur heard.   Pulmonary:      Effort: Pulmonary effort is normal. No respiratory distress.      Breath sounds: Normal breath sounds. No stridor. No wheezing, rhonchi or rales.   Chest:      Chest wall: No tenderness.   Abdominal:      General: Bowel sounds are normal.   Musculoskeletal:      Right lower leg: No edema.      Left lower leg: No edema.   Skin:     General: Skin is warm.   Neurological:      Mental Status: She is alert and oriented to person, place, and time.   Psychiatric:         Mood and Affect: Mood normal.         Behavior: Behavior normal.        A chaperone was offered to the patient during today's exam. Patient declined chaperone.        Assessment & Plan:     89 y.o. female with the following -     1. Fibromyalgia  Chronic, stable on Norco as needed.  - URINE DRUG SCREEN; Future  - HYDROcodone-acetaminophen (NORCO) 7.5-325 MG tab; Take 1 Tablet by mouth every 8 hours as needed for Severe Pain for up to 30 days.  Dispense: 90 Tablet; Refill: 0  - HYDROcodone-acetaminophen (NORCO) 7.5-325 MG tab; Take 1 Tablet by mouth every 8 hours as needed for Severe Pain for up to 30 days.  Dispense: 90 Tablet; Refill: 0    2. Stage 3b chronic kidney disease (HCC)  Chronic, stable, will continue to avoid nephrotoxic agents and increase her hydration.    3. Uncomplicated opoid dependence   Chronic, stable, prescription refill for Norco 7.5-325 mg tablets 1 tablet every 8 hours as needed.  Her  most of the time she does not take it daily, however some days she takes up to 3 a day due to her increased pain.  This has helped keep her from taking large amounts of Excedrin and aspirin to control her pain which has not returned helped her kidney function.  I have obtained and reviewed  patient utilization report from [facility] pharmacy database on 10/24/22  prior to writing prescription for controlled substance II, III or IV per [place] bill . Based on assessment of the report, my physical exam, and the patient's health problem, the prescription is medically necessary.      Additionally, I have discussed the risk and benefits, treatment plan, and alternative therapies with the patient    Other orders  - GINSENG CHINESE RED PO; Take 1 Capsule by mouth every day. Mental clarity supplement         No follow-ups on file.    Please note that this dictation was created using voice recognition software. I have made every reasonable attempt to correct obvious errors, but I expect that there are errors of grammar and possibly content that I did not discover before finalizing the note.

## 2022-10-24 NOTE — ASSESSMENT & PLAN NOTE
"Chronic, she was previously going to [facility] pain management but they were no longer contracted with her current [employer] insurance.  I then referred her to physiatry where she was being seen for pain management by Dr. Su.  She is refusing to go back to physiatry at this time due to the co-pay Norco 7.5/325, 1 tablet every 8 hours as needed. Patient understands this prescription is a controlled substance which is potentially habit-forming and its use is regulated by the [employer]. We also discussed the new \"black box\" warning regarding the lethal combination of opioids and benzodiazepines. Refills are subject to terms of a controlled substance agreement and patient has an updated one on file. Most recent UDS is appropriate. Any refill requires an office visit. Narcotics have may adverse effects and the risks of addiction, accidental overdose and death were emphasized. Provided prescriptions for the next three months.      "

## 2022-11-02 ENCOUNTER — TELEPHONE (OUTPATIENT)
Dept: HEALTH INFORMATION MANAGEMENT | Facility: OTHER | Age: 87
End: 2022-11-02
Payer: MEDICARE

## 2022-11-03 ENCOUNTER — PATIENT MESSAGE (OUTPATIENT)
Dept: HEALTH INFORMATION MANAGEMENT | Facility: OTHER | Age: 87
End: 2022-11-03

## 2022-12-28 NOTE — PROGRESS NOTES
Subjective:     CC: Establish care    HISTORY OF THE PRESENT ILLNESS: Patient is a 89 y.o. female here today with her  to establish care.  Patient has memory issues and history was predominantly obtained from her .    Fibromyalgia  Currently on Cymbalta 60 mg daily, Norco 7.5 mg 3 times daily, and Tizanidine 2 mg at bedtime.  Patient has Narcan at home.   reports that the patient is always crying in pain and he manages her medications.  Patient's  reports pain was better controlled when she was on 90 mg of Cymbalta and he is requesting to increase the dose.  Patient has seen pain management in the past but was unhappy with the care.     Primary hypertension  /62 today.  Currently on amlodipine 10 mg daily and lisinopril 20 mg daily.    Other specified hypothyroidism  Member 2022 TSH WNL.  Currently on levothyroxine 50 mcg daily.    Health Maintenance: Completed    Allergies   Allergen Reactions    Nkda [No Known Drug Allergy]      Patient Active Problem List   Diagnosis    Fibromyalgia    IBS (irritable bowel syndrome)    Dyslipidemia    Hives    CATARACT    Tachyarrhythmia    Primary hypertension    GERD (gastroesophageal reflux disease)    Recurrent major depressive disorder, in full remission (HCC)    Glaucoma    LBBB (left bundle branch block)    Stage 3b chronic kidney disease (HCC)    Fatigue    Other hammer toe (acquired)    Other specified hypothyroidism    Normocytic anemia    Sinus bradycardia    Uncomplicated opioid dependence (HCC)    Memory loss of unknown cause    Left hip pain    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 30 Capsule 2    DULoxetine (CYMBALTA) 60 MG Cap DR Particles  delayed-release capsule Take 1 Capsule by mouth every day. 100 Capsule 0    tizanidine (ZANAFLEX) 2 MG tablet TAKE ONE TABLET BY MOUTH AT BEDTIME AS NEEDED 100 Tablet 2    GINSENG CHINESE RED PO Take 1 Capsule by mouth every day. Mental clarity supplement      levothyroxine (SYNTHROID) 50 MCG Tab Take 1 Tablet by mouth every morning on an empty stomach. 100 Tablet 3    amLODIPine (NORVASC) 10 MG Tab TAKE ONE TABLET BY MOUTH EVERY  Tablet 3    Calcium Magnesium Zinc 333-133-5 MG Tab Take 1 Tablet by mouth every day.      ferrous sulfate 325 (65 Fe) MG tablet Take 325 mg by mouth every 7 days.      famotidine (PEPCID) 10 MG tablet Take 10 mg by mouth every day.      docusate sodium (COLACE) 100 MG Cap Take 100 mg by mouth every day.      dorzolamide (TRUSOPT) 2 % Solution Administer 1 Drop into both eyes 2 times a day.      latanoprost (XALATAN) 0.005 % Solution Administer 1 Drop into both eyes every day.      Naloxone (NARCAN) 4 MG/0.1ML Liquid One spray in one nostril for overdose and call 911. 1 Each 0    lisinopril (PRINIVIL) 20 MG Tab TAKE ONE TABLET BY MOUTH TWICE A  Tablet 3     No current facility-administered medications for this visit.     Past Surgical History:   Procedure Laterality Date    TOE AMPUTATION Left 8/6/2015    Procedure: TOE AMPUTATION 2ND;  Surgeon: Fredi Nelson D.P.M.;  Location: SURGERY [facility];  Service:     CATARACT EXTRACTION WITH IOL      OTHER      cataracts removed      Social History     Socioeconomic History    Marital status:      Spouse name: Not on file    Number of children: Not on file    Years of education: Not on file    Highest education level: Not on file   Occupational History    Not on file   Tobacco Use    Smoking status: Never    Smokeless tobacco: Never   Vaping Use    Vaping Use: Never used   Substance and Sexual Activity    Alcohol use: No    Drug use: No    Sexual activity: Not Currently     Partners: Male   Other Topics Concern      Service No    Blood Transfusions No    Caffeine Concern No    Occupational Exposure No    Hobby Hazards No    Sleep Concern No    Stress Concern No    Weight Concern No    Special Diet No    Back Care No    Exercise No    Bike Helmet No    Seat Belt Yes    Self-Exams No   Social History Narrative    Not on file     Social Determinants of Health     Financial Resource Strain: Low Risk     Difficulty of Paying Living Expenses: Not very hard   Food Insecurity: Not on file   Transportation Needs: No Transportation Needs    Lack of Transportation (Medical): No    Lack of Transportation (Non-Medical): No   Physical Activity: Not on file   Stress: Stress Concern Present    Feeling of Stress : To some extent   Social Connections: Moderately Isolated    Frequency of Communication with Friends and Family: Three times a week    Frequency of Social Gatherings with Friends and Family: Twice a week    Attends Sabianism Services: Never    Active Member of Clubs or Organizations: No    Attends Club or Organization Meetings: Never    Marital Status:    Intimate Partner Violence: Not on file   Housing Stability: Not on file     Family History   Problem Relation Age of Onset    Heart Disease Father     Lung Disease Father          ROS:     Constitutional:  Negative for chills, fever, fatigue, weight loss.  HEENT:  Negative for blurred vision, hearing loss, sore throat.    Respiratory:  Negative for cough, sputum production and shortness of breath.  Cardiovascular:  Negative for chest pain, palpitations and leg swelling.  Gastrointestinal:  Negative for abdominal pain, blood in stool, constipation, diarrhea and vomiting.   Musculoskeletal:  Negative for back pain, falls, joint pain and neck pain.   Skin:  Negative for rash.   Neurological:  Negative for dizziness, seizures, weakness and headaches.   Endo/Heme/Allergies:  Does not bruise/bleed easily.   Psychiatric/Behavioral:  Negative for depression, anxiety and  suicidal thoughts.      Objective:     Exam: BP (!) 140/62   Pulse 76   Temp 36.4 °C (97.5 °F) (Temporal)   Resp 13   Ht 1.524 m (5')   Wt 52.2 kg (115 lb)   SpO2 98%  Body mass index is 22.46 kg/m².    Gen: Alert and oriented, no acute distress.  Eyes:  PERRL, conjunctivae clear, lids normal.   Neck: Neck is supple, trachea middle, no palpable lymphadenopathy or thyromegaly.  Lungs: Normal effort, CTAB, no wheezing / rhonchi / rales.  CV: RRR, normal S1 and S2, no murmurs.  GI:  Abdomen soft, non-tender, non-distended with normal bowel sounds.  MSK:  Normal ROM.  Ext: No clubbing, cyanosis, or edema.  Skin:  Warm and dry with no rashes or lesions.  Neuro: AAO x 3, no acute focal deficits.  Psych: Normal affect and mood.      Assessment & Plan:   89 y.o. female with the following -    1. Fibromyalgia  2. Uncomplicated opioid dependence (HCC)  Chronic.  Currently on Cymbalta 60 mg daily, Norco 7.5 mg 3 times daily, and Tizanidine 2 mg at bedtime.  Patient has Narcan at home.  Cymbalta was increased to 60 mg in the morning and 30 mg in the evening.  If patient has better pain control we can consider cutting down the Norco.  UDS done today and CS agreement will be signed at the next visit prior to the [facility] refill.  - DULoxetine (CYMBALTA) 30 MG Cap DR Particles; Take 1 Capsule by mouth every day.  Dispense: 30 Capsule; Refill: 2  - URINE DRUG SCREEN W/CONF (SEND OUT); Future    3. Primary hypertension  Chronic.  /62 today.  Continue amlodipine 10 mg daily and lisinopril 20 mg daily.    4. Other specified hypothyroidism  Chronic.  November 2022 TSH WNL.  Continue levothyroxine 50 mcg daily.          I spent a total of 32 minutes with record review, exam, communication with the patient, communication with other providers, and documentation of this encounter.    Return in about 4 weeks (around 1/25/2023) for CS refill.    Please note that this dictation was created using voice recognition software. I have  made every reasonable attempt to correct obvious errors, but I expect that there are errors of grammar and possibly content that I did not discover before finalizing the note.

## 2022-12-29 PROBLEM — F11.20 UNCOMPLICATED OPIOID DEPENDENCE (HCC): Chronic | Status: ACTIVE | Noted: 2021-06-29

## 2022-12-29 PROBLEM — E03.8 OTHER SPECIFIED HYPOTHYROIDISM: Chronic | Status: ACTIVE | Noted: 2018-07-25

## 2022-12-29 NOTE — ASSESSMENT & PLAN NOTE
Currently on Cymbalta 60 mg daily, Norco 7.5 mg 3 times daily, and Tizanidine 2 mg at bedtime.  Patient has Narcan at home.   reports that the patient is always crying in pain and he manages her medications.  Patient's  reports pain was better controlled when she was on 90 mg of Cymbalta and he is requesting to increase the dose.  Patient has seen pain management in the past but was unhappy with the care.

## 2023-01-01 ENCOUNTER — TELEPHONE (OUTPATIENT)
Dept: MEDICAL GROUP | Facility: PHYSICIAN GROUP | Age: 88
End: 2023-01-01
Payer: MEDICARE

## 2023-01-01 ENCOUNTER — OFFICE VISIT (OUTPATIENT)
Dept: MEDICAL GROUP | Facility: PHYSICIAN GROUP | Age: 88
End: 2023-01-01
Payer: MEDICARE

## 2023-01-01 ENCOUNTER — APPOINTMENT (OUTPATIENT)
Dept: RADIOLOGY | Facility: MEDICAL CENTER | Age: 88
DRG: 377 | End: 2023-01-01
Attending: STUDENT IN AN ORGANIZED HEALTH CARE EDUCATION/TRAINING PROGRAM
Payer: MEDICARE

## 2023-01-01 ENCOUNTER — HOSPITAL ENCOUNTER (OUTPATIENT)
Facility: MEDICAL CENTER | Age: 88
DRG: 377 | End: 2023-10-11
Attending: STUDENT IN AN ORGANIZED HEALTH CARE EDUCATION/TRAINING PROGRAM
Payer: MEDICARE

## 2023-01-01 ENCOUNTER — HOME CARE VISIT (OUTPATIENT)
Dept: HOME HEALTH SERVICES | Facility: HOME HEALTHCARE | Age: 88
End: 2023-01-01
Payer: MEDICARE

## 2023-01-01 ENCOUNTER — HOSPITAL ENCOUNTER (OUTPATIENT)
Facility: MEDICAL CENTER | Age: 88
End: 2023-10-26
Attending: NURSE PRACTITIONER
Payer: MEDICARE

## 2023-01-01 ENCOUNTER — APPOINTMENT (OUTPATIENT)
Dept: MEDICAL GROUP | Facility: PHYSICIAN GROUP | Age: 88
End: 2023-01-01
Payer: MEDICARE

## 2023-01-01 ENCOUNTER — ANESTHESIA (OUTPATIENT)
Dept: SURGERY | Facility: MEDICAL CENTER | Age: 88
DRG: 377 | End: 2023-01-01
Payer: MEDICARE

## 2023-01-01 ENCOUNTER — TELEPHONE (OUTPATIENT)
Dept: HEALTH INFORMATION MANAGEMENT | Facility: OTHER | Age: 88
End: 2023-01-01
Payer: MEDICARE

## 2023-01-01 ENCOUNTER — HOME HEALTH ADMISSION (OUTPATIENT)
Dept: HOME HEALTH SERVICES | Facility: HOME HEALTHCARE | Age: 88
End: 2023-01-01
Payer: MEDICARE

## 2023-01-01 ENCOUNTER — HOSPITAL ENCOUNTER (EMERGENCY)
Facility: MEDICAL CENTER | Age: 88
End: 2023-11-01
Attending: EMERGENCY MEDICINE
Payer: MEDICARE

## 2023-01-01 ENCOUNTER — TELEPHONE (OUTPATIENT)
Dept: URGENT CARE | Facility: PHYSICIAN GROUP | Age: 88
End: 2023-01-01
Payer: MEDICARE

## 2023-01-01 ENCOUNTER — PHARMACY VISIT (OUTPATIENT)
Dept: PHARMACY | Facility: MEDICAL CENTER | Age: 88
End: 2023-01-01
Payer: COMMERCIAL

## 2023-01-01 ENCOUNTER — ANESTHESIA EVENT (OUTPATIENT)
Dept: SURGERY | Facility: MEDICAL CENTER | Age: 88
DRG: 377 | End: 2023-01-01
Payer: MEDICARE

## 2023-01-01 ENCOUNTER — APPOINTMENT (OUTPATIENT)
Dept: RADIOLOGY | Facility: MEDICAL CENTER | Age: 88
End: 2023-01-01
Attending: EMERGENCY MEDICINE
Payer: MEDICARE

## 2023-01-01 ENCOUNTER — HOSPITAL ENCOUNTER (INPATIENT)
Facility: MEDICAL CENTER | Age: 88
LOS: 4 days | DRG: 377 | End: 2023-10-18
Attending: STUDENT IN AN ORGANIZED HEALTH CARE EDUCATION/TRAINING PROGRAM | Admitting: INTERNAL MEDICINE
Payer: MEDICARE

## 2023-01-01 ENCOUNTER — PATIENT OUTREACH (OUTPATIENT)
Dept: HEALTH INFORMATION MANAGEMENT | Facility: OTHER | Age: 88
End: 2023-01-01
Payer: MEDICARE

## 2023-01-01 ENCOUNTER — HOSPITAL ENCOUNTER (EMERGENCY)
Facility: MEDICAL CENTER | Age: 88
End: 2023-11-13
Attending: EMERGENCY MEDICINE
Payer: MEDICARE

## 2023-01-01 ENCOUNTER — DOCUMENTATION (OUTPATIENT)
Dept: MEDICAL GROUP | Facility: PHYSICIAN GROUP | Age: 88
End: 2023-01-01
Payer: MEDICARE

## 2023-01-01 ENCOUNTER — APPOINTMENT (OUTPATIENT)
Dept: CARDIOLOGY | Facility: MEDICAL CENTER | Age: 88
DRG: 377 | End: 2023-01-01
Attending: NURSE PRACTITIONER
Payer: MEDICARE

## 2023-01-01 ENCOUNTER — TELEPHONE (OUTPATIENT)
Dept: SOCIAL WORK | Facility: CLINIC | Age: 88
End: 2023-01-01
Payer: MEDICARE

## 2023-01-01 ENCOUNTER — HOSPITAL ENCOUNTER (OUTPATIENT)
Dept: RADIOLOGY | Facility: MEDICAL CENTER | Age: 88
DRG: 377 | End: 2023-10-11
Attending: STUDENT IN AN ORGANIZED HEALTH CARE EDUCATION/TRAINING PROGRAM
Payer: MEDICARE

## 2023-01-01 ENCOUNTER — HOSPITAL ENCOUNTER (EMERGENCY)
Facility: MEDICAL CENTER | Age: 88
End: 2023-06-06
Attending: EMERGENCY MEDICINE
Payer: MEDICARE

## 2023-01-01 VITALS
HEART RATE: 69 BPM | WEIGHT: 114 LBS | BODY MASS INDEX: 22.38 KG/M2 | TEMPERATURE: 98.3 F | SYSTOLIC BLOOD PRESSURE: 140 MMHG | DIASTOLIC BLOOD PRESSURE: 60 MMHG | RESPIRATION RATE: 12 BRPM | OXYGEN SATURATION: 97 % | HEIGHT: 60 IN

## 2023-01-01 VITALS
TEMPERATURE: 97.8 F | OXYGEN SATURATION: 90 % | HEART RATE: 74 BPM | SYSTOLIC BLOOD PRESSURE: 140 MMHG | DIASTOLIC BLOOD PRESSURE: 72 MMHG | RESPIRATION RATE: 16 BRPM

## 2023-01-01 VITALS — OXYGEN SATURATION: 74 % | WEIGHT: 94 LBS | RESPIRATION RATE: 25 BRPM | HEIGHT: 60 IN | BODY MASS INDEX: 18.46 KG/M2

## 2023-01-01 VITALS
WEIGHT: 94 LBS | DIASTOLIC BLOOD PRESSURE: 84 MMHG | HEART RATE: 72 BPM | TEMPERATURE: 96.8 F | SYSTOLIC BLOOD PRESSURE: 150 MMHG | HEIGHT: 60 IN | OXYGEN SATURATION: 95 % | BODY MASS INDEX: 18.46 KG/M2

## 2023-01-01 VITALS
RESPIRATION RATE: 16 BRPM | OXYGEN SATURATION: 95 % | TEMPERATURE: 98.9 F | SYSTOLIC BLOOD PRESSURE: 164 MMHG | HEART RATE: 90 BPM | DIASTOLIC BLOOD PRESSURE: 64 MMHG

## 2023-01-01 VITALS
DIASTOLIC BLOOD PRESSURE: 60 MMHG | OXYGEN SATURATION: 95 % | HEART RATE: 62 BPM | SYSTOLIC BLOOD PRESSURE: 110 MMHG | RESPIRATION RATE: 17 BRPM | TEMPERATURE: 97.8 F

## 2023-01-01 VITALS
BODY MASS INDEX: 18.35 KG/M2 | HEART RATE: 85 BPM | WEIGHT: 91 LBS | DIASTOLIC BLOOD PRESSURE: 78 MMHG | HEIGHT: 59 IN | TEMPERATURE: 97.5 F | OXYGEN SATURATION: 96 % | SYSTOLIC BLOOD PRESSURE: 142 MMHG

## 2023-01-01 VITALS
SYSTOLIC BLOOD PRESSURE: 115 MMHG | HEART RATE: 82 BPM | OXYGEN SATURATION: 98 % | WEIGHT: 93.92 LBS | RESPIRATION RATE: 18 BRPM | HEIGHT: 60 IN | DIASTOLIC BLOOD PRESSURE: 70 MMHG | TEMPERATURE: 98 F | BODY MASS INDEX: 18.44 KG/M2

## 2023-01-01 VITALS
WEIGHT: 114.64 LBS | BODY MASS INDEX: 23.11 KG/M2 | SYSTOLIC BLOOD PRESSURE: 168 MMHG | RESPIRATION RATE: 18 BRPM | HEART RATE: 78 BPM | OXYGEN SATURATION: 95 % | TEMPERATURE: 97.2 F | HEIGHT: 59 IN | DIASTOLIC BLOOD PRESSURE: 77 MMHG

## 2023-01-01 VITALS
TEMPERATURE: 97.7 F | HEIGHT: 59 IN | RESPIRATION RATE: 13 BRPM | HEART RATE: 72 BPM | WEIGHT: 96 LBS | DIASTOLIC BLOOD PRESSURE: 66 MMHG | OXYGEN SATURATION: 93 % | BODY MASS INDEX: 19.35 KG/M2 | SYSTOLIC BLOOD PRESSURE: 122 MMHG

## 2023-01-01 VITALS
DIASTOLIC BLOOD PRESSURE: 78 MMHG | SYSTOLIC BLOOD PRESSURE: 138 MMHG | OXYGEN SATURATION: 92 % | HEART RATE: 70 BPM | TEMPERATURE: 99.4 F | RESPIRATION RATE: 16 BRPM

## 2023-01-01 VITALS
RESPIRATION RATE: 16 BRPM | BODY MASS INDEX: 19.27 KG/M2 | HEIGHT: 59 IN | WEIGHT: 95.6 LBS | HEART RATE: 67 BPM | DIASTOLIC BLOOD PRESSURE: 58 MMHG | SYSTOLIC BLOOD PRESSURE: 118 MMHG | TEMPERATURE: 98.6 F | OXYGEN SATURATION: 97 %

## 2023-01-01 VITALS
SYSTOLIC BLOOD PRESSURE: 130 MMHG | DIASTOLIC BLOOD PRESSURE: 70 MMHG | OXYGEN SATURATION: 96 % | TEMPERATURE: 98.4 F | HEART RATE: 69 BPM | RESPIRATION RATE: 16 BRPM

## 2023-01-01 VITALS
HEART RATE: 73 BPM | OXYGEN SATURATION: 90 % | TEMPERATURE: 97.6 F | RESPIRATION RATE: 16 BRPM | DIASTOLIC BLOOD PRESSURE: 64 MMHG | SYSTOLIC BLOOD PRESSURE: 124 MMHG

## 2023-01-01 VITALS
HEART RATE: 77 BPM | WEIGHT: 105 LBS | RESPIRATION RATE: 16 BRPM | DIASTOLIC BLOOD PRESSURE: 57 MMHG | HEIGHT: 59 IN | BODY MASS INDEX: 21.17 KG/M2 | OXYGEN SATURATION: 95 % | TEMPERATURE: 98 F | SYSTOLIC BLOOD PRESSURE: 111 MMHG

## 2023-01-01 VITALS
WEIGHT: 105 LBS | OXYGEN SATURATION: 95 % | SYSTOLIC BLOOD PRESSURE: 110 MMHG | HEART RATE: 87 BPM | TEMPERATURE: 98.2 F | DIASTOLIC BLOOD PRESSURE: 50 MMHG | RESPIRATION RATE: 20 BRPM | BODY MASS INDEX: 21.17 KG/M2 | HEIGHT: 59 IN

## 2023-01-01 DIAGNOSIS — I10 PRIMARY HYPERTENSION: Chronic | ICD-10-CM

## 2023-01-01 DIAGNOSIS — E43 SEVERE PROTEIN-CALORIE MALNUTRITION (HCC): ICD-10-CM

## 2023-01-01 DIAGNOSIS — M79.7 FIBROMYALGIA: Chronic | ICD-10-CM

## 2023-01-01 DIAGNOSIS — R41.0 DISORIENTATION: ICD-10-CM

## 2023-01-01 DIAGNOSIS — Z79.899 CONTROLLED SUBSTANCE AGREEMENT SIGNED: ICD-10-CM

## 2023-01-01 DIAGNOSIS — Z23 NEED FOR VACCINATION: ICD-10-CM

## 2023-01-01 DIAGNOSIS — S09.90XA TRAUMATIC INJURY OF HEAD, INITIAL ENCOUNTER: ICD-10-CM

## 2023-01-01 DIAGNOSIS — K21.01 GASTROESOPHAGEAL REFLUX DISEASE WITH ESOPHAGITIS AND HEMORRHAGE: ICD-10-CM

## 2023-01-01 DIAGNOSIS — N18.32 STAGE 3B CHRONIC KIDNEY DISEASE: Chronic | ICD-10-CM

## 2023-01-01 DIAGNOSIS — I10 PRIMARY HYPERTENSION: ICD-10-CM

## 2023-01-01 DIAGNOSIS — K25.4 GASTROINTESTINAL HEMORRHAGE ASSOCIATED WITH GASTRIC ULCER: ICD-10-CM

## 2023-01-01 DIAGNOSIS — K58.9 IRRITABLE BOWEL SYNDROME, UNSPECIFIED TYPE: ICD-10-CM

## 2023-01-01 DIAGNOSIS — K92.2 ACUTE UPPER GI BLEED: ICD-10-CM

## 2023-01-01 DIAGNOSIS — Z09 HOSPITAL DISCHARGE FOLLOW-UP: ICD-10-CM

## 2023-01-01 DIAGNOSIS — M46.1 DEGENERATIVE JOINT DISEASE OF SACROILIAC JOINT (HCC): ICD-10-CM

## 2023-01-01 DIAGNOSIS — R11.2 NAUSEA AND VOMITING, UNSPECIFIED VOMITING TYPE: ICD-10-CM

## 2023-01-01 DIAGNOSIS — D50.9 IRON DEFICIENCY ANEMIA, UNSPECIFIED IRON DEFICIENCY ANEMIA TYPE: ICD-10-CM

## 2023-01-01 DIAGNOSIS — K25.4 GASTRIC ULCER WITH HEMORRHAGE, UNSPECIFIED CHRONICITY: ICD-10-CM

## 2023-01-01 DIAGNOSIS — E55.9 VITAMIN D DEFICIENCY: ICD-10-CM

## 2023-01-01 DIAGNOSIS — M79.7 FIBROMYALGIA: ICD-10-CM

## 2023-01-01 DIAGNOSIS — S09.90XA MINOR HEAD TRAUMA: ICD-10-CM

## 2023-01-01 DIAGNOSIS — F11.20 UNCOMPLICATED OPIOID DEPENDENCE (HCC): Chronic | ICD-10-CM

## 2023-01-01 DIAGNOSIS — K92.2 GASTROINTESTINAL HEMORRHAGE, UNSPECIFIED GASTROINTESTINAL HEMORRHAGE TYPE: ICD-10-CM

## 2023-01-01 DIAGNOSIS — F33.42 RECURRENT MAJOR DEPRESSIVE DISORDER, IN FULL REMISSION (HCC): ICD-10-CM

## 2023-01-01 DIAGNOSIS — R29.6 FALLS FREQUENTLY: ICD-10-CM

## 2023-01-01 DIAGNOSIS — D62 ACUTE BLOOD LOSS ANEMIA: ICD-10-CM

## 2023-01-01 DIAGNOSIS — K31.1 GASTRIC OUTLET OBSTRUCTION: ICD-10-CM

## 2023-01-01 DIAGNOSIS — E03.8 OTHER SPECIFIED HYPOTHYROIDISM: Chronic | ICD-10-CM

## 2023-01-01 DIAGNOSIS — S00.03XA HEMATOMA OF SCALP, INITIAL ENCOUNTER: ICD-10-CM

## 2023-01-01 DIAGNOSIS — R55 SYNCOPE, UNSPECIFIED SYNCOPE TYPE: ICD-10-CM

## 2023-01-01 DIAGNOSIS — K59.00 CONSTIPATION, UNSPECIFIED CONSTIPATION TYPE: ICD-10-CM
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
## 2023-01-01 LAB
6MAM UR CFM-MCNC: <10 NG/ML
ABO + RH BLD: NORMAL
ABO GROUP BLD: NORMAL
ACANTHOCYTES BLD QL SMEAR: NORMAL
ALBUMIN SERPL BCP-MCNC: 2.8 G/DL (ref 3.2–4.9)
ALBUMIN SERPL BCP-MCNC: 3.4 G/DL (ref 3.2–4.9)
ALBUMIN SERPL BCP-MCNC: 3.6 G/DL (ref 3.2–4.9)
ALBUMIN SERPL BCP-MCNC: 3.7 G/DL (ref 3.2–4.9)
ALBUMIN/GLOB SERPL: 1 G/DL
ALBUMIN/GLOB SERPL: 1 G/DL
ALBUMIN/GLOB SERPL: 1.2 G/DL
ALBUMIN/GLOB SERPL: 1.2 G/DL
ALP SERPL-CCNC: 39 U/L (ref 30–99)
ALP SERPL-CCNC: 51 U/L (ref 30–99)
ALP SERPL-CCNC: 64 U/L (ref 30–99)
ALP SERPL-CCNC: 68 U/L (ref 30–99)
ALT SERPL-CCNC: 5 U/L (ref 2–50)
ALT SERPL-CCNC: 7 U/L (ref 2–50)
ALT SERPL-CCNC: 8 U/L (ref 2–50)
ALT SERPL-CCNC: <5 U/L (ref 2–50)
AMBIGUOUS DTTM AMBI4: NORMAL
AMPHET CTO UR CFM-MCNC: NEGATIVE NG/ML
ANION GAP SERPL CALC-SCNC: 10 MMOL/L (ref 7–16)
ANION GAP SERPL CALC-SCNC: 11 MMOL/L (ref 7–16)
ANION GAP SERPL CALC-SCNC: 12 MMOL/L (ref 7–16)
ANION GAP SERPL CALC-SCNC: 18 MMOL/L (ref 7–16)
ANION GAP SERPL CALC-SCNC: 24 MMOL/L (ref 7–16)
ANION GAP SERPL CALC-SCNC: 8 MMOL/L (ref 7–16)
ANION GAP SERPL CALC-SCNC: 9 MMOL/L (ref 7–16)
ANISOCYTOSIS BLD QL SMEAR: ABNORMAL
APTT PPP: 23.7 SEC (ref 24.7–36)
APTT PPP: 25.7 SEC (ref 24.7–36)
APTT PPP: 29 SEC (ref 24.7–36)
AST SERPL-CCNC: 11 U/L (ref 12–45)
AST SERPL-CCNC: 13 U/L (ref 12–45)
AST SERPL-CCNC: 16 U/L (ref 12–45)
AST SERPL-CCNC: 26 U/L (ref 12–45)
BACTERIA BLD CULT: NORMAL
BACTERIA BLD CULT: NORMAL
BARBITURATES CTO UR CFM-MCNC: NEGATIVE NG/ML
BARCODED ABORH UBTYP: 8400
BARCODED PRD CODE UBPRD: NORMAL
BARCODED UNIT NUM UBUNT: NORMAL
BASOPHILS # BLD AUTO: 0 % (ref 0–1.8)
BASOPHILS # BLD AUTO: 0.1 % (ref 0–1.8)
BASOPHILS # BLD AUTO: 0.2 % (ref 0–1.8)
BASOPHILS # BLD AUTO: 0.3 % (ref 0–1.8)
BASOPHILS # BLD AUTO: 0.3 % (ref 0–1.8)
BASOPHILS # BLD: 0 K/UL (ref 0–0.12)
BASOPHILS # BLD: 0.01 K/UL (ref 0–0.12)
BASOPHILS # BLD: 0.02 K/UL (ref 0–0.12)
BASOPHILS # BLD: 0.02 K/UL (ref 0–0.12)
BASOPHILS # BLD: 0.03 K/UL (ref 0–0.12)
BENZODIAZ CTO UR CFM-MCNC: NEGATIVE NG/ML
BILIRUB SERPL-MCNC: 0.2 MG/DL (ref 0.1–1.5)
BILIRUB SERPL-MCNC: <0.2 MG/DL (ref 0.1–1.5)
BLD GP AB SCN SERPL QL: NORMAL
BUN SERPL-MCNC: 11 MG/DL (ref 8–22)
BUN SERPL-MCNC: 15 MG/DL (ref 8–22)
BUN SERPL-MCNC: 15 MG/DL (ref 8–22)
BUN SERPL-MCNC: 17 MG/DL (ref 8–22)
BUN SERPL-MCNC: 24 MG/DL (ref 8–22)
BUN SERPL-MCNC: 38 MG/DL (ref 8–22)
BUN SERPL-MCNC: 46 MG/DL (ref 8–22)
BURR CELLS BLD QL SMEAR: NORMAL
CALCIUM ALBUM COR SERPL-MCNC: 11.5 MG/DL (ref 8.5–10.5)
CALCIUM ALBUM COR SERPL-MCNC: 8.7 MG/DL (ref 8.5–10.5)
CALCIUM ALBUM COR SERPL-MCNC: 9.4 MG/DL (ref 8.5–10.5)
CALCIUM ALBUM COR SERPL-MCNC: 9.6 MG/DL (ref 8.5–10.5)
CALCIUM SERPL-MCNC: 11.3 MG/DL (ref 8.5–10.5)
CALCIUM SERPL-MCNC: 7.7 MG/DL (ref 8.5–10.5)
CALCIUM SERPL-MCNC: 8.1 MG/DL (ref 8.5–10.5)
CALCIUM SERPL-MCNC: 9.1 MG/DL (ref 8.4–10.2)
CALCIUM SERPL-MCNC: 9.1 MG/DL (ref 8.5–10.5)
CANNABINOIDS CTO UR CFM-MCNC: NEGATIVE NG/ML
CHLORIDE SERPL-SCNC: 102 MMOL/L (ref 96–112)
CHLORIDE SERPL-SCNC: 110 MMOL/L (ref 96–112)
CHLORIDE SERPL-SCNC: 110 MMOL/L (ref 96–112)
CHLORIDE SERPL-SCNC: 111 MMOL/L (ref 96–112)
CHLORIDE SERPL-SCNC: 95 MMOL/L (ref 96–112)
CHLORIDE SERPL-SCNC: 96 MMOL/L (ref 96–112)
CHLORIDE SERPL-SCNC: 97 MMOL/L (ref 96–112)
CO2 SERPL-SCNC: 14 MMOL/L (ref 20–33)
CO2 SERPL-SCNC: 21 MMOL/L (ref 20–33)
CO2 SERPL-SCNC: 23 MMOL/L (ref 20–33)
CO2 SERPL-SCNC: 24 MMOL/L (ref 20–33)
CO2 SERPL-SCNC: 31 MMOL/L (ref 20–33)
COCAINE CTO UR CFM-MCNC: NEGATIVE NG/ML
CODEINE UR CFM-MCNC: <20 NG/ML
COMMENT 1642: NORMAL
COMMENT NL1176: NORMAL
COMPONENT R 8504R: NORMAL
CREAT SERPL-MCNC: 1.02 MG/DL (ref 0.5–1.4)
CREAT SERPL-MCNC: 1.1 MG/DL (ref 0.5–1.4)
CREAT SERPL-MCNC: 1.11 MG/DL (ref 0.5–1.4)
CREAT SERPL-MCNC: 1.14 MG/DL (ref 0.5–1.4)
CREAT SERPL-MCNC: 1.23 MG/DL (ref 0.5–1.4)
CREAT SERPL-MCNC: 1.26 MG/DL (ref 0.5–1.4)
CREAT SERPL-MCNC: 1.41 MG/DL (ref 0.5–1.4)
CRP SERPL HS-MCNC: 1.26 MG/DL (ref 0–0.75)
DRUG COMMENT 753798: NORMAL
EKG IMPRESSION: NORMAL
EOSINOPHIL # BLD AUTO: 0.02 K/UL (ref 0–0.51)
EOSINOPHIL # BLD AUTO: 0.04 K/UL (ref 0–0.51)
EOSINOPHIL # BLD AUTO: 0.08 K/UL (ref 0–0.51)
EOSINOPHIL # BLD AUTO: 0.09 K/UL (ref 0–0.51)
EOSINOPHIL # BLD AUTO: 0.27 K/UL (ref 0–0.51)
EOSINOPHIL NFR BLD: 0.2 % (ref 0–6.9)
EOSINOPHIL NFR BLD: 0.6 % (ref 0–6.9)
EOSINOPHIL NFR BLD: 0.7 % (ref 0–6.9)
EOSINOPHIL NFR BLD: 1.5 % (ref 0–6.9)
EOSINOPHIL NFR BLD: 3.5 % (ref 0–6.9)
ERYTHROCYTE [DISTWIDTH] IN BLOOD BY AUTOMATED COUNT: 51 FL (ref 35.9–50)
ERYTHROCYTE [DISTWIDTH] IN BLOOD BY AUTOMATED COUNT: 52.8 FL (ref 35.9–50)
ERYTHROCYTE [DISTWIDTH] IN BLOOD BY AUTOMATED COUNT: 53.1 FL (ref 35.9–50)
ERYTHROCYTE [DISTWIDTH] IN BLOOD BY AUTOMATED COUNT: 54.6 FL (ref 35.9–50)
ERYTHROCYTE [DISTWIDTH] IN BLOOD BY AUTOMATED COUNT: 57.6 FL (ref 35.9–50)
ERYTHROCYTE [DISTWIDTH] IN BLOOD BY AUTOMATED COUNT: 58.9 FL (ref 35.9–50)
ERYTHROCYTE [SEDIMENTATION RATE] IN BLOOD BY WESTERGREN METHOD: 58 MM/HOUR (ref 0–25)
FERRITIN SERPL-MCNC: 35.6 NG/ML (ref 10–291)
FERRITIN SERPL-MCNC: 39.3 NG/ML (ref 10–291)
GFR SERPLBLD CREATININE-BSD FMLA CKD-EPI: 35 ML/MIN/1.73 M 2
GFR SERPLBLD CREATININE-BSD FMLA CKD-EPI: 40 ML/MIN/1.73 M 2
GFR SERPLBLD CREATININE-BSD FMLA CKD-EPI: 41 ML/MIN/1.73 M 2
GFR SERPLBLD CREATININE-BSD FMLA CKD-EPI: 45 ML/MIN/1.73 M 2
GFR SERPLBLD CREATININE-BSD FMLA CKD-EPI: 47 ML/MIN/1.73 M 2
GFR SERPLBLD CREATININE-BSD FMLA CKD-EPI: 47 ML/MIN/1.73 M 2
GFR SERPLBLD CREATININE-BSD FMLA CKD-EPI: 52 ML/MIN/1.73 M 2
GLOBULIN SER CALC-MCNC: 2.3 G/DL (ref 1.9–3.5)
GLOBULIN SER CALC-MCNC: 2.9 G/DL (ref 1.9–3.5)
GLOBULIN SER CALC-MCNC: 3.5 G/DL (ref 1.9–3.5)
GLOBULIN SER CALC-MCNC: 3.8 G/DL (ref 1.9–3.5)
GLUCOSE SERPL-MCNC: 102 MG/DL (ref 65–99)
GLUCOSE SERPL-MCNC: 147 MG/DL (ref 65–99)
GLUCOSE SERPL-MCNC: 168 MG/DL (ref 65–99)
GLUCOSE SERPL-MCNC: 84 MG/DL (ref 65–99)
GLUCOSE SERPL-MCNC: 87 MG/DL (ref 65–99)
GLUCOSE SERPL-MCNC: 87 MG/DL (ref 65–99)
GLUCOSE SERPL-MCNC: 89 MG/DL (ref 65–99)
HCT VFR BLD AUTO: 22.1 % (ref 37–47)
HCT VFR BLD AUTO: 22.4 % (ref 37–47)
HCT VFR BLD AUTO: 22.7 % (ref 37–47)
HCT VFR BLD AUTO: 23.3 % (ref 37–47)
HCT VFR BLD AUTO: 23.4 % (ref 37–47)
HCT VFR BLD AUTO: 23.5 % (ref 37–47)
HCT VFR BLD AUTO: 24 % (ref 37–47)
HCT VFR BLD AUTO: 24.3 % (ref 37–47)
HCT VFR BLD AUTO: 24.4 % (ref 37–47)
HCT VFR BLD AUTO: 25.8 % (ref 37–47)
HCT VFR BLD AUTO: 25.9 % (ref 37–47)
HCT VFR BLD AUTO: 26.4 % (ref 37–47)
HCT VFR BLD AUTO: 27.9 % (ref 37–47)
HCT VFR BLD AUTO: 33.4 % (ref 37–47)
HGB BLD-MCNC: 7.2 G/DL (ref 12–16)
HGB BLD-MCNC: 7.3 G/DL (ref 12–16)
HGB BLD-MCNC: 7.3 G/DL (ref 12–16)
HGB BLD-MCNC: 7.4 G/DL (ref 12–16)
HGB BLD-MCNC: 7.5 G/DL (ref 12–16)
HGB BLD-MCNC: 7.6 G/DL (ref 12–16)
HGB BLD-MCNC: 7.6 G/DL (ref 12–16)
HGB BLD-MCNC: 7.7 G/DL (ref 12–16)
HGB BLD-MCNC: 7.9 G/DL (ref 12–16)
HGB BLD-MCNC: 8.2 G/DL (ref 12–16)
HGB BLD-MCNC: 8.3 G/DL (ref 12–16)
HGB BLD-MCNC: 8.4 G/DL (ref 12–16)
HGB BLD-MCNC: 8.8 G/DL (ref 12–16)
HGB BLD-MCNC: 9.9 G/DL (ref 12–16)
HYDROCODONE UR CFM-MCNC: <20 NG/ML
HYDROMORPHONE UR CFM-MCNC: <20 NG/ML
IMM GRANULOCYTES # BLD AUTO: 0.02 K/UL (ref 0–0.11)
IMM GRANULOCYTES # BLD AUTO: 0.03 K/UL (ref 0–0.11)
IMM GRANULOCYTES # BLD AUTO: 0.04 K/UL (ref 0–0.11)
IMM GRANULOCYTES # BLD AUTO: 0.07 K/UL (ref 0–0.11)
IMM GRANULOCYTES NFR BLD AUTO: 0.3 % (ref 0–0.9)
IMM GRANULOCYTES NFR BLD AUTO: 0.3 % (ref 0–0.9)
IMM GRANULOCYTES NFR BLD AUTO: 0.5 % (ref 0–0.9)
IMM GRANULOCYTES NFR BLD AUTO: 1 % (ref 0–0.9)
INR PPP: 0.98 (ref 0.87–1.13)
INR PPP: 1.03 (ref 0.87–1.13)
INR PPP: 1.04 (ref 0.87–1.13)
IRON SATN MFR SERPL: 23 % (ref 15–55)
IRON SATN MFR SERPL: 8 % (ref 15–55)
IRON SATN MFR SERPL: 9 % (ref 15–55)
IRON SERPL-MCNC: 17 UG/DL (ref 40–170)
IRON SERPL-MCNC: 21 UG/DL (ref 40–170)
IRON SERPL-MCNC: 57 UG/DL (ref 40–170)
LACTATE SERPL-SCNC: 1 MMOL/L (ref 0.5–2)
LACTATE SERPL-SCNC: 2.4 MMOL/L (ref 0.5–2)
LIPASE SERPL-CCNC: 129 U/L (ref 11–82)
LIPASE SERPL-CCNC: 21 U/L (ref 11–82)
LIPASE SERPL-CCNC: 25 U/L (ref 11–82)
LV EJECT FRACT MOD 2C 99903: 57.98
LV EJECT FRACT MOD 4C 99902: 62.44
LV EJECT FRACT MOD BP 99901: 60.8
LYMPHOCYTES # BLD AUTO: 2.22 K/UL (ref 1–4.8)
LYMPHOCYTES # BLD AUTO: 2.5 K/UL (ref 1–4.8)
LYMPHOCYTES # BLD AUTO: 3.39 K/UL (ref 1–4.8)
LYMPHOCYTES # BLD AUTO: 3.59 K/UL (ref 1–4.8)
LYMPHOCYTES # BLD AUTO: 4.36 K/UL (ref 1–4.8)
LYMPHOCYTES NFR BLD: 30.5 % (ref 22–41)
LYMPHOCYTES NFR BLD: 31.7 % (ref 22–41)
LYMPHOCYTES NFR BLD: 35.8 % (ref 22–41)
LYMPHOCYTES NFR BLD: 43.5 % (ref 22–41)
LYMPHOCYTES NFR BLD: 61.2 % (ref 22–41)
MACROCYTES BLD QL SMEAR: ABNORMAL
MAGNESIUM SERPL-MCNC: 1.9 MG/DL (ref 1.5–2.5)
MAGNESIUM SERPL-MCNC: 2 MG/DL (ref 1.5–2.5)
MAGNESIUM SERPL-MCNC: 2.2 MG/DL (ref 1.5–2.5)
MANUAL DIFF BLD: NORMAL
MCH RBC QN AUTO: 28.4 PG (ref 27–33)
MCH RBC QN AUTO: 28.9 PG (ref 27–33)
MCH RBC QN AUTO: 29.1 PG (ref 27–33)
MCH RBC QN AUTO: 29.3 PG (ref 27–33)
MCH RBC QN AUTO: 29.6 PG (ref 27–33)
MCH RBC QN AUTO: 30.9 PG (ref 27–33)
MCHC RBC AUTO-ENTMCNC: 29.6 G/DL (ref 32.2–35.5)
MCHC RBC AUTO-ENTMCNC: 30.5 G/DL (ref 32.2–35.5)
MCHC RBC AUTO-ENTMCNC: 31.1 G/DL (ref 32.2–35.5)
MCHC RBC AUTO-ENTMCNC: 31.5 G/DL (ref 32.2–35.5)
MCHC RBC AUTO-ENTMCNC: 32.6 G/DL (ref 32.2–35.5)
MCHC RBC AUTO-ENTMCNC: 33 G/DL (ref 32.2–35.5)
MCV RBC AUTO: 90.7 FL (ref 81.4–97.8)
MCV RBC AUTO: 93 FL (ref 81.4–97.8)
MCV RBC AUTO: 93.6 FL (ref 81.4–97.8)
MCV RBC AUTO: 93.6 FL (ref 81.4–97.8)
MCV RBC AUTO: 94.9 FL (ref 81.4–97.8)
MCV RBC AUTO: 95.7 FL (ref 81.4–97.8)
METHADONE CTO UR CFM-MCNC: NEGATIVE NG/ML
MONOCYTES # BLD AUTO: 0.27 K/UL (ref 0–0.85)
MONOCYTES # BLD AUTO: 0.27 K/UL (ref 0–0.85)
MONOCYTES # BLD AUTO: 0.65 K/UL (ref 0–0.85)
MONOCYTES # BLD AUTO: 0.69 K/UL (ref 0–0.85)
MONOCYTES # BLD AUTO: 0.99 K/UL (ref 0–0.85)
MONOCYTES NFR BLD AUTO: 11.1 % (ref 0–13.4)
MONOCYTES NFR BLD AUTO: 3.5 % (ref 0–13.4)
MONOCYTES NFR BLD AUTO: 3.8 % (ref 0–13.4)
MONOCYTES NFR BLD AUTO: 7.9 % (ref 0–13.4)
MONOCYTES NFR BLD AUTO: 8.8 % (ref 0–13.4)
MORPHINE UR CFM-MCNC: <20 NG/ML
MORPHOLOGY BLD-IMP: NORMAL
MORPHOLOGY BLD-IMP: NORMAL
NEUTROPHILS # BLD AUTO: 2.38 K/UL (ref 1.82–7.42)
NEUTROPHILS # BLD AUTO: 3.16 K/UL (ref 1.82–7.42)
NEUTROPHILS # BLD AUTO: 3.86 K/UL (ref 1.82–7.42)
NEUTROPHILS # BLD AUTO: 4.97 K/UL (ref 1.82–7.42)
NEUTROPHILS # BLD AUTO: 6.59 K/UL (ref 1.82–7.42)
NEUTROPHILS NFR BLD: 33.3 % (ref 44–72)
NEUTROPHILS NFR BLD: 48.7 % (ref 44–72)
NEUTROPHILS NFR BLD: 51 % (ref 44–72)
NEUTROPHILS NFR BLD: 58.2 % (ref 44–72)
NEUTROPHILS NFR BLD: 60.7 % (ref 44–72)
NEUTS BAND NFR BLD MANUAL: 0.8 % (ref 0–10)
NORHYDROCODONE UR CFM-MCNC: 242 NG/ML
NOROXYCODONE UR CFM-MCNC: <20 NG/ML
NRBC # BLD AUTO: 0 K/UL
NRBC BLD-RTO: 0 /100 WBC (ref 0–0.2)
NT-PROBNP SERPL IA-MCNC: 4361 PG/ML (ref 0–125)
NT-PROBNP SERPL IA-MCNC: 5689 PG/ML (ref 0–125)
OPIATES CTO UR CFM-MCNC: NEGATIVE NG/ML
OPIATES UR NOROXYM Q0836: <20 NG/ML
OVALOCYTES BLD QL SMEAR: NORMAL
OVALOCYTES BLD QL SMEAR: NORMAL
OXYCODONE UR CFM-MCNC: <20 NG/ML
OXYMORPHONE UR CFM-MCNC: <20 NG/ML
PATHOLOGY CONSULT NOTE: NORMAL
PCP CTO UR CFM-MCNC: NEGATIVE NG/ML
PHOSPHATE SERPL-MCNC: 3.3 MG/DL (ref 2.5–4.5)
PHOSPHATE SERPL-MCNC: 3.4 MG/DL (ref 2.5–4.5)
PLATELET # BLD AUTO: 245 K/UL (ref 164–446)
PLATELET # BLD AUTO: 299 K/UL (ref 164–446)
PLATELET # BLD AUTO: 333 K/UL (ref 164–446)
PLATELET # BLD AUTO: 469 K/UL (ref 164–446)
PLATELET # BLD AUTO: 535 K/UL (ref 164–446)
PLATELET # BLD AUTO: 555 K/UL (ref 164–446)
PLATELET BLD QL SMEAR: NORMAL
PLATELET BLD QL SMEAR: NORMAL
PMV BLD AUTO: 10.2 FL (ref 9–12.9)
PMV BLD AUTO: 10.3 FL (ref 9–12.9)
PMV BLD AUTO: 10.4 FL (ref 9–12.9)
PMV BLD AUTO: 9.2 FL (ref 9–12.9)
PMV BLD AUTO: 9.6 FL (ref 9–12.9)
PMV BLD AUTO: 9.9 FL (ref 9–12.9)
POIKILOCYTOSIS BLD QL SMEAR: NORMAL
POIKILOCYTOSIS BLD QL SMEAR: NORMAL
POTASSIUM SERPL-SCNC: 3.1 MMOL/L (ref 3.6–5.5)
POTASSIUM SERPL-SCNC: 3.6 MMOL/L (ref 3.6–5.5)
POTASSIUM SERPL-SCNC: 3.6 MMOL/L (ref 3.6–5.5)
POTASSIUM SERPL-SCNC: 4 MMOL/L (ref 3.6–5.5)
POTASSIUM SERPL-SCNC: 4 MMOL/L (ref 3.6–5.5)
POTASSIUM SERPL-SCNC: 4.1 MMOL/L (ref 3.6–5.5)
POTASSIUM SERPL-SCNC: 4.7 MMOL/L (ref 3.6–5.5)
PRODUCT TYPE UPROD: NORMAL
PROPOXYPH CTO UR CFM-MCNC: NEGATIVE NG/ML
PROT SERPL-MCNC: 5.1 G/DL (ref 6–8.2)
PROT SERPL-MCNC: 6.5 G/DL (ref 6–8.2)
PROT SERPL-MCNC: 6.9 G/DL (ref 6–8.2)
PROT SERPL-MCNC: 7.5 G/DL (ref 6–8.2)
PROTHROMBIN TIME: 13.5 SEC (ref 12–14.6)
PROTHROMBIN TIME: 13.6 SEC (ref 12–14.6)
PROTHROMBIN TIME: 13.7 SEC (ref 12–14.6)
RBC # BLD AUTO: 2.47 M/UL (ref 4.2–5.4)
RBC # BLD AUTO: 2.49 M/UL (ref 4.2–5.4)
RBC # BLD AUTO: 2.56 M/UL (ref 4.2–5.4)
RBC # BLD AUTO: 2.82 M/UL (ref 4.2–5.4)
RBC # BLD AUTO: 3 M/UL (ref 4.2–5.4)
RBC # BLD AUTO: 3.49 M/UL (ref 4.2–5.4)
RBC BLD AUTO: PRESENT
RBC BLD AUTO: PRESENT
RH BLD: NORMAL
SIGNIFICANT IND 70042: NORMAL
SIGNIFICANT IND 70042: NORMAL
SITE SITE: NORMAL
SITE SITE: NORMAL
SODIUM SERPL-SCNC: 133 MMOL/L (ref 135–145)
SODIUM SERPL-SCNC: 134 MMOL/L (ref 135–145)
SODIUM SERPL-SCNC: 139 MMOL/L (ref 135–145)
SODIUM SERPL-SCNC: 139 MMOL/L (ref 135–145)
SODIUM SERPL-SCNC: 140 MMOL/L (ref 135–145)
SODIUM SERPL-SCNC: 141 MMOL/L (ref 135–145)
SODIUM SERPL-SCNC: 142 MMOL/L (ref 135–145)
SOURCE SOURCE: NORMAL
SOURCE SOURCE: NORMAL
T4 FREE SERPL-MCNC: 1.46 NG/DL (ref 0.93–1.7)
T4 FREE SERPL-MCNC: 1.56 NG/DL (ref 0.93–1.7)
TARGETS BLD QL SMEAR: NORMAL
TIBC SERPL-MCNC: 194 UG/DL (ref 250–450)
TIBC SERPL-MCNC: 248 UG/DL (ref 250–450)
TIBC SERPL-MCNC: 249 UG/DL (ref 250–450)
TROPONIN T SERPL-MCNC: 25 NG/L (ref 6–19)
TROPONIN T SERPL-MCNC: 27 NG/L (ref 6–19)
TROPONIN T SERPL-MCNC: 28 NG/L (ref 6–19)
TROPONIN T SERPL-MCNC: 44 NG/L (ref 6–19)
TSH SERPL DL<=0.005 MIU/L-ACNC: 5.5 UIU/ML (ref 0.38–5.33)
TSH SERPL DL<=0.005 MIU/L-ACNC: 6.42 UIU/ML (ref 0.38–5.33)
UIBC SERPL-MCNC: 177 UG/DL (ref 110–370)
UIBC SERPL-MCNC: 192 UG/DL (ref 110–370)
UIBC SERPL-MCNC: 227 UG/DL (ref 110–370)
UNIT STATUS USTAT: NORMAL
VIT B12 SERPL-MCNC: 1293 PG/ML (ref 211–911)
VIT B12 SERPL-MCNC: 1302 PG/ML (ref 211–911)
WBC # BLD AUTO: 11.3 K/UL (ref 4.8–10.8)
WBC # BLD AUTO: 6.2 K/UL (ref 4.8–10.8)
WBC # BLD AUTO: 6.8 K/UL (ref 4.8–10.8)
WBC # BLD AUTO: 7.1 K/UL (ref 4.8–10.8)
WBC # BLD AUTO: 7.8 K/UL (ref 4.8–10.8)
WBC # BLD AUTO: 8.2 K/UL (ref 4.8–10.8)

## 2023-01-01 PROCEDURE — 72170 X-RAY EXAM OF PELVIS: CPT

## 2023-01-01 PROCEDURE — G0493 RN CARE EA 15 MIN HH/HOSPICE: HCPCS

## 2023-01-01 PROCEDURE — 700111 HCHG RX REV CODE 636 W/ 250 OVERRIDE (IP): Performed by: INTERNAL MEDICINE

## 2023-01-01 PROCEDURE — 700105 HCHG RX REV CODE 258: Performed by: INTERNAL MEDICINE

## 2023-01-01 PROCEDURE — 51798 US URINE CAPACITY MEASURE: CPT

## 2023-01-01 PROCEDURE — 85652 RBC SED RATE AUTOMATED: CPT

## 2023-01-01 PROCEDURE — 88305 TISSUE EXAM BY PATHOLOGIST: CPT

## 2023-01-01 PROCEDURE — 770020 HCHG ROOM/CARE - TELE (206)

## 2023-01-01 PROCEDURE — 90662 IIV NO PRSV INCREASED AG IM: CPT | Performed by: STUDENT IN AN ORGANIZED HEALTH CARE EDUCATION/TRAINING PROGRAM

## 2023-01-01 PROCEDURE — 36415 COLL VENOUS BLD VENIPUNCTURE: CPT

## 2023-01-01 PROCEDURE — 3074F SYST BP LT 130 MM HG: CPT | Performed by: STUDENT IN AN ORGANIZED HEALTH CARE EDUCATION/TRAINING PROGRAM

## 2023-01-01 PROCEDURE — 82607 VITAMIN B-12: CPT

## 2023-01-01 PROCEDURE — 80053 COMPREHEN METABOLIC PANEL: CPT

## 2023-01-01 PROCEDURE — 86900 BLOOD TYPING SEROLOGIC ABO: CPT

## 2023-01-01 PROCEDURE — 99222 1ST HOSP IP/OBS MODERATE 55: CPT | Performed by: SPECIALIST

## 2023-01-01 PROCEDURE — A9270 NON-COVERED ITEM OR SERVICE: HCPCS | Performed by: INTERNAL MEDICINE

## 2023-01-01 PROCEDURE — 84484 ASSAY OF TROPONIN QUANT: CPT

## 2023-01-01 PROCEDURE — 700102 HCHG RX REV CODE 250 W/ 637 OVERRIDE(OP): Mod: JZ

## 2023-01-01 PROCEDURE — 84443 ASSAY THYROID STIM HORMONE: CPT

## 2023-01-01 PROCEDURE — 83690 ASSAY OF LIPASE: CPT

## 2023-01-01 PROCEDURE — 700117 HCHG RX CONTRAST REV CODE 255: Performed by: STUDENT IN AN ORGANIZED HEALTH CARE EDUCATION/TRAINING PROGRAM

## 2023-01-01 PROCEDURE — 74174 CTA ABD&PLVS W/CONTRAST: CPT

## 2023-01-01 PROCEDURE — G0152 HHCP-SERV OF OT,EA 15 MIN: HCPCS

## 2023-01-01 PROCEDURE — 3077F SYST BP >= 140 MM HG: CPT | Performed by: STUDENT IN AN ORGANIZED HEALTH CARE EDUCATION/TRAINING PROGRAM

## 2023-01-01 PROCEDURE — A9270 NON-COVERED ITEM OR SERVICE: HCPCS

## 2023-01-01 PROCEDURE — 99284 EMERGENCY DEPT VISIT MOD MDM: CPT

## 2023-01-01 PROCEDURE — 700101 HCHG RX REV CODE 250: Performed by: HOSPITALIST

## 2023-01-01 PROCEDURE — 93005 ELECTROCARDIOGRAM TRACING: CPT

## 2023-01-01 PROCEDURE — 99233 SBSQ HOSP IP/OBS HIGH 50: CPT | Performed by: INTERNAL MEDICINE

## 2023-01-01 PROCEDURE — C9113 INJ PANTOPRAZOLE SODIUM, VIA: HCPCS | Performed by: STUDENT IN AN ORGANIZED HEALTH CARE EDUCATION/TRAINING PROGRAM

## 2023-01-01 PROCEDURE — 70450 CT HEAD/BRAIN W/O DYE: CPT

## 2023-01-01 PROCEDURE — 700102 HCHG RX REV CODE 250 W/ 637 OVERRIDE(OP): Performed by: INTERNAL MEDICINE

## 2023-01-01 PROCEDURE — 85025 COMPLETE CBC W/AUTO DIFF WBC: CPT

## 2023-01-01 PROCEDURE — A9270 NON-COVERED ITEM OR SERVICE: HCPCS | Mod: JZ

## 2023-01-01 PROCEDURE — 85018 HEMOGLOBIN: CPT | Mod: 91

## 2023-01-01 PROCEDURE — 99285 EMERGENCY DEPT VISIT HI MDM: CPT

## 2023-01-01 PROCEDURE — 99222 1ST HOSP IP/OBS MODERATE 55: CPT | Performed by: INTERNAL MEDICINE

## 2023-01-01 PROCEDURE — 99232 SBSQ HOSP IP/OBS MODERATE 35: CPT | Mod: FS | Performed by: STUDENT IN AN ORGANIZED HEALTH CARE EDUCATION/TRAINING PROGRAM

## 2023-01-01 PROCEDURE — 93005 ELECTROCARDIOGRAM TRACING: CPT | Performed by: INTERNAL MEDICINE

## 2023-01-01 PROCEDURE — 86923 COMPATIBILITY TEST ELECTRIC: CPT

## 2023-01-01 PROCEDURE — 99497 ADVNCD CARE PLAN 30 MIN: CPT | Performed by: INTERNAL MEDICINE

## 2023-01-01 PROCEDURE — 85014 HEMATOCRIT: CPT | Mod: 91

## 2023-01-01 PROCEDURE — 82728 ASSAY OF FERRITIN: CPT

## 2023-01-01 PROCEDURE — 86850 RBC ANTIBODY SCREEN: CPT

## 2023-01-01 PROCEDURE — 700111 HCHG RX REV CODE 636 W/ 250 OVERRIDE (IP): Performed by: ANESTHESIOLOGY

## 2023-01-01 PROCEDURE — 3074F SYST BP LT 130 MM HG: CPT | Performed by: NURSE PRACTITIONER

## 2023-01-01 PROCEDURE — 97535 SELF CARE MNGMENT TRAINING: CPT

## 2023-01-01 PROCEDURE — 83605 ASSAY OF LACTIC ACID: CPT

## 2023-01-01 PROCEDURE — 85610 PROTHROMBIN TIME: CPT

## 2023-01-01 PROCEDURE — 85730 THROMBOPLASTIN TIME PARTIAL: CPT

## 2023-01-01 PROCEDURE — G0299 HHS/HOSPICE OF RN EA 15 MIN: HCPCS

## 2023-01-01 PROCEDURE — C9113 INJ PANTOPRAZOLE SODIUM, VIA: HCPCS | Performed by: INTERNAL MEDICINE

## 2023-01-01 PROCEDURE — 700111 HCHG RX REV CODE 636 W/ 250 OVERRIDE (IP): Mod: JZ | Performed by: STUDENT IN AN ORGANIZED HEALTH CARE EDUCATION/TRAINING PROGRAM

## 2023-01-01 PROCEDURE — 96374 THER/PROPH/DIAG INJ IV PUSH: CPT

## 2023-01-01 PROCEDURE — 83550 IRON BINDING TEST: CPT

## 2023-01-01 PROCEDURE — 85014 HEMATOCRIT: CPT

## 2023-01-01 PROCEDURE — 99439 CHRNC CARE MGMT STAF EA ADDL: CPT | Performed by: STUDENT IN AN ORGANIZED HEALTH CARE EDUCATION/TRAINING PROGRAM

## 2023-01-01 PROCEDURE — 83735 ASSAY OF MAGNESIUM: CPT

## 2023-01-01 PROCEDURE — 85018 HEMOGLOBIN: CPT

## 2023-01-01 PROCEDURE — RXMED WILLOW AMBULATORY MEDICATION CHARGE: Performed by: INTERNAL MEDICINE

## 2023-01-01 PROCEDURE — 93306 TTE W/DOPPLER COMPLETE: CPT | Mod: 26 | Performed by: INTERNAL MEDICINE

## 2023-01-01 PROCEDURE — 85007 BL SMEAR W/DIFF WBC COUNT: CPT

## 2023-01-01 PROCEDURE — 96375 TX/PRO/DX INJ NEW DRUG ADDON: CPT

## 2023-01-01 PROCEDURE — 160048 HCHG OR STATISTICAL LEVEL 1-5: Performed by: INTERNAL MEDICINE

## 2023-01-01 PROCEDURE — 80048 BASIC METABOLIC PNL TOTAL CA: CPT

## 2023-01-01 PROCEDURE — 85027 COMPLETE CBC AUTOMATED: CPT

## 2023-01-01 PROCEDURE — 83540 ASSAY OF IRON: CPT

## 2023-01-01 PROCEDURE — A9270 NON-COVERED ITEM OR SERVICE: HCPCS | Performed by: EMERGENCY MEDICINE

## 2023-01-01 PROCEDURE — 99214 OFFICE O/P EST MOD 30 MIN: CPT | Mod: 25 | Performed by: STUDENT IN AN ORGANIZED HEALTH CARE EDUCATION/TRAINING PROGRAM

## 2023-01-01 PROCEDURE — 93005 ELECTROCARDIOGRAM TRACING: CPT | Performed by: STUDENT IN AN ORGANIZED HEALTH CARE EDUCATION/TRAINING PROGRAM

## 2023-01-01 PROCEDURE — 99214 OFFICE O/P EST MOD 30 MIN: CPT | Performed by: STUDENT IN AN ORGANIZED HEALTH CARE EDUCATION/TRAINING PROGRAM

## 2023-01-01 PROCEDURE — 160035 HCHG PACU - 1ST 60 MINS PHASE I: Performed by: INTERNAL MEDICINE

## 2023-01-01 PROCEDURE — 3078F DIAST BP <80 MM HG: CPT | Performed by: STUDENT IN AN ORGANIZED HEALTH CARE EDUCATION/TRAINING PROGRAM

## 2023-01-01 PROCEDURE — 43239 EGD BIOPSY SINGLE/MULTIPLE: CPT | Performed by: INTERNAL MEDICINE

## 2023-01-01 PROCEDURE — 99223 1ST HOSP IP/OBS HIGH 75: CPT | Performed by: INTERNAL MEDICINE

## 2023-01-01 PROCEDURE — 94760 N-INVAS EAR/PLS OXIMETRY 1: CPT

## 2023-01-01 PROCEDURE — 700101 HCHG RX REV CODE 250: Performed by: INTERNAL MEDICINE

## 2023-01-01 PROCEDURE — 700117 HCHG RX CONTRAST REV CODE 255: Performed by: EMERGENCY MEDICINE

## 2023-01-01 PROCEDURE — 99233 SBSQ HOSP IP/OBS HIGH 50: CPT | Performed by: HOSPITALIST

## 2023-01-01 PROCEDURE — 700102 HCHG RX REV CODE 250 W/ 637 OVERRIDE(OP)

## 2023-01-01 PROCEDURE — 3079F DIAST BP 80-89 MM HG: CPT | Performed by: STUDENT IN AN ORGANIZED HEALTH CARE EDUCATION/TRAINING PROGRAM

## 2023-01-01 PROCEDURE — 97162 PT EVAL MOD COMPLEX 30 MIN: CPT

## 2023-01-01 PROCEDURE — G0008 ADMIN INFLUENZA VIRUS VAC: HCPCS | Performed by: STUDENT IN AN ORGANIZED HEALTH CARE EDUCATION/TRAINING PROGRAM

## 2023-01-01 PROCEDURE — 770006 HCHG ROOM/CARE - MED/SURG/GYN SEMI*

## 2023-01-01 PROCEDURE — 99232 SBSQ HOSP IP/OBS MODERATE 35: CPT | Performed by: NURSE PRACTITIONER

## 2023-01-01 PROCEDURE — 99999 PR NO CHARGE: CPT | Performed by: STUDENT IN AN ORGANIZED HEALTH CARE EDUCATION/TRAINING PROGRAM

## 2023-01-01 PROCEDURE — 87040 BLOOD CULTURE FOR BACTERIA: CPT | Mod: 91

## 2023-01-01 PROCEDURE — 93005 ELECTROCARDIOGRAM TRACING: CPT | Performed by: EMERGENCY MEDICINE

## 2023-01-01 PROCEDURE — 71045 X-RAY EXAM CHEST 1 VIEW: CPT

## 2023-01-01 PROCEDURE — 700105 HCHG RX REV CODE 258: Performed by: STUDENT IN AN ORGANIZED HEALTH CARE EDUCATION/TRAINING PROGRAM

## 2023-01-01 PROCEDURE — G0180 MD CERTIFICATION HHA PATIENT: HCPCS | Performed by: STUDENT IN AN ORGANIZED HEALTH CARE EDUCATION/TRAINING PROGRAM

## 2023-01-01 PROCEDURE — 36430 TRANSFUSION BLD/BLD COMPNT: CPT

## 2023-01-01 PROCEDURE — 99490 CHRNC CARE MGMT STAFF 1ST 20: CPT | Performed by: STUDENT IN AN ORGANIZED HEALTH CARE EDUCATION/TRAINING PROGRAM

## 2023-01-01 PROCEDURE — 93010 ELECTROCARDIOGRAM REPORT: CPT | Performed by: STUDENT IN AN ORGANIZED HEALTH CARE EDUCATION/TRAINING PROGRAM

## 2023-01-01 PROCEDURE — 700105 HCHG RX REV CODE 258: Performed by: EMERGENCY MEDICINE

## 2023-01-01 PROCEDURE — 88312 SPECIAL STAINS GROUP 1: CPT

## 2023-01-01 PROCEDURE — 84439 ASSAY OF FREE THYROXINE: CPT

## 2023-01-01 PROCEDURE — 160203 HCHG ENDO MINUTES - 1ST 30 MINS LEVEL 4: Performed by: INTERNAL MEDICINE

## 2023-01-01 PROCEDURE — 80307 DRUG TEST PRSMV CHEM ANLYZR: CPT

## 2023-01-01 PROCEDURE — 86140 C-REACTIVE PROTEIN: CPT

## 2023-01-01 PROCEDURE — 770001 HCHG ROOM/CARE - MED/SURG/GYN PRIV*

## 2023-01-01 PROCEDURE — 160009 HCHG ANES TIME/MIN: Performed by: INTERNAL MEDICINE

## 2023-01-01 PROCEDURE — 3078F DIAST BP <80 MM HG: CPT | Performed by: NURSE PRACTITIONER

## 2023-01-01 PROCEDURE — 160002 HCHG RECOVERY MINUTES (STAT): Performed by: INTERNAL MEDICINE

## 2023-01-01 PROCEDURE — 74177 CT ABD & PELVIS W/CONTRAST: CPT

## 2023-01-01 PROCEDURE — 83880 ASSAY OF NATRIURETIC PEPTIDE: CPT

## 2023-01-01 PROCEDURE — 86901 BLOOD TYPING SEROLOGIC RH(D): CPT

## 2023-01-01 PROCEDURE — 99214 OFFICE O/P EST MOD 30 MIN: CPT | Performed by: NURSE PRACTITIONER

## 2023-01-01 PROCEDURE — 700105 HCHG RX REV CODE 258: Performed by: ANESTHESIOLOGY

## 2023-01-01 PROCEDURE — P9016 RBC LEUKOCYTES REDUCED: HCPCS

## 2023-01-01 PROCEDURE — 700111 HCHG RX REV CODE 636 W/ 250 OVERRIDE (IP)

## 2023-01-01 PROCEDURE — 93306 TTE W/DOPPLER COMPLETE: CPT

## 2023-01-01 PROCEDURE — 99239 HOSP IP/OBS DSCHRG MGMT >30: CPT | Performed by: INTERNAL MEDICINE

## 2023-01-01 PROCEDURE — 700102 HCHG RX REV CODE 250 W/ 637 OVERRIDE(OP): Performed by: EMERGENCY MEDICINE

## 2023-01-01 PROCEDURE — 665001 SOC-HOME HEALTH

## 2023-01-01 PROCEDURE — 84100 ASSAY OF PHOSPHORUS: CPT

## 2023-01-01 PROCEDURE — G0495 RN CARE TRAIN/EDU IN HH: HCPCS

## 2023-01-01 PROCEDURE — 0DB68ZX EXCISION OF STOMACH, VIA NATURAL OR ARTIFICIAL OPENING ENDOSCOPIC, DIAGNOSTIC: ICD-10-PCS | Performed by: INTERNAL MEDICINE

## 2023-01-01 PROCEDURE — G0151 HHCP-SERV OF PT,EA 15 MIN: HCPCS

## 2023-01-01 RX ORDER — OMEPRAZOLE 20 MG/1
20 CAPSULE, DELAYED RELEASE ORAL 2 TIMES DAILY
Status: DISCONTINUED | OUTPATIENT
Start: 2023-01-01 | End: 2023-01-01 | Stop reason: HOSPADM

## 2023-01-01 RX ORDER — SODIUM CHLORIDE 9 MG/ML
500 INJECTION, SOLUTION INTRAVENOUS ONCE
Status: COMPLETED | OUTPATIENT
Start: 2023-01-01 | End: 2023-01-01

## 2023-01-01 RX ORDER — METHYLPREDNISOLONE SODIUM SUCCINATE 125 MG/2ML
125 INJECTION, POWDER, LYOPHILIZED, FOR SOLUTION INTRAMUSCULAR; INTRAVENOUS PRN
Status: CANCELLED | OUTPATIENT
Start: 2023-01-01

## 2023-01-01 RX ORDER — PANTOPRAZOLE SODIUM 40 MG/10ML
40 INJECTION, POWDER, LYOPHILIZED, FOR SOLUTION INTRAVENOUS 2 TIMES DAILY
Status: COMPLETED | OUTPATIENT
Start: 2023-01-01 | End: 2023-01-01

## 2023-01-01 RX ORDER — DIPHENHYDRAMINE HYDROCHLORIDE 50 MG/ML
50 INJECTION INTRAMUSCULAR; INTRAVENOUS PRN
Status: CANCELLED | OUTPATIENT
Start: 2023-01-01

## 2023-01-01 RX ORDER — QUETIAPINE FUMARATE 25 MG/1
12.5 TABLET, FILM COATED ORAL ONCE
Status: COMPLETED | OUTPATIENT
Start: 2023-01-01 | End: 2023-01-01

## 2023-01-01 RX ORDER — FOLIC ACID 1 MG/1
1 TABLET ORAL DAILY
Status: DISCONTINUED | OUTPATIENT
Start: 2023-01-01 | End: 2023-01-01 | Stop reason: HOSPADM

## 2023-01-01 RX ORDER — TRAMADOL HYDROCHLORIDE 50 MG/1
50 TABLET ORAL EVERY 8 HOURS PRN
Qty: 90 TABLET | Refills: 0 | Status: SHIPPED
Start: 2023-01-01 | End: 2023-01-01

## 2023-01-01 RX ORDER — MORPHINE SULFATE 4 MG/ML
2 INJECTION INTRAVENOUS
Status: DISCONTINUED | OUTPATIENT
Start: 2023-01-01 | End: 2023-01-01 | Stop reason: HOSPADM

## 2023-01-01 RX ORDER — SODIUM CHLORIDE AND POTASSIUM CHLORIDE 300; 900 MG/100ML; MG/100ML
INJECTION, SOLUTION INTRAVENOUS CONTINUOUS
Status: DISCONTINUED | OUTPATIENT
Start: 2023-01-01 | End: 2023-01-01

## 2023-01-01 RX ORDER — TRAMADOL HYDROCHLORIDE 50 MG/1
50 TABLET ORAL EVERY 8 HOURS PRN
Qty: 90 TABLET | Refills: 0 | Status: SHIPPED | OUTPATIENT
Start: 2023-01-01 | End: 2023-01-01

## 2023-01-01 RX ORDER — OXYCODONE HCL 5 MG/5 ML
10 SOLUTION, ORAL ORAL
Status: DISCONTINUED | OUTPATIENT
Start: 2023-01-01 | End: 2023-01-01 | Stop reason: HOSPADM

## 2023-01-01 RX ORDER — SODIUM CHLORIDE 9 MG/ML
INJECTION, SOLUTION INTRAVENOUS CONTINUOUS
Status: DISCONTINUED | OUTPATIENT
Start: 2023-01-01 | End: 2023-01-01

## 2023-01-01 RX ORDER — GAUZE BANDAGE 2" X 2"
100 BANDAGE TOPICAL DAILY
Status: DISCONTINUED | OUTPATIENT
Start: 2023-01-01 | End: 2023-01-01 | Stop reason: HOSPADM

## 2023-01-01 RX ORDER — OXYCODONE HCL 5 MG/5 ML
5 SOLUTION, ORAL ORAL
Status: DISCONTINUED | OUTPATIENT
Start: 2023-01-01 | End: 2023-01-01 | Stop reason: HOSPADM

## 2023-01-01 RX ORDER — SODIUM CHLORIDE, SODIUM LACTATE, POTASSIUM CHLORIDE, CALCIUM CHLORIDE 600; 310; 30; 20 MG/100ML; MG/100ML; MG/100ML; MG/100ML
INJECTION, SOLUTION INTRAVENOUS
Status: DISCONTINUED | OUTPATIENT
Start: 2023-01-01 | End: 2023-01-01 | Stop reason: SURG

## 2023-01-01 RX ORDER — OXYCODONE HYDROCHLORIDE 5 MG/1
2.5 TABLET ORAL
Status: DISCONTINUED | OUTPATIENT
Start: 2023-01-01 | End: 2023-01-01 | Stop reason: HOSPADM

## 2023-01-01 RX ORDER — EPINEPHRINE 1 MG/ML(1)
0.5 AMPUL (ML) INJECTION PRN
Status: CANCELLED | OUTPATIENT
Start: 2023-01-01

## 2023-01-01 RX ORDER — TRAMADOL HYDROCHLORIDE 50 MG/1
50 TABLET ORAL EVERY 8 HOURS PRN
Qty: 90 TABLET | Refills: 0 | Status: SHIPPED
Start: 2023-12-11 | End: 2023-01-01

## 2023-01-01 RX ORDER — DULOXETIN HYDROCHLORIDE 30 MG/1
30 CAPSULE, DELAYED RELEASE ORAL EVERY EVENING
Qty: 90 CAPSULE | Refills: 0 | Status: SHIPPED
Start: 2023-01-01 | End: 2023-01-01

## 2023-01-01 RX ORDER — ACETAMINOPHEN 325 MG/1
650 TABLET ORAL EVERY 6 HOURS PRN
Status: DISCONTINUED | OUTPATIENT
Start: 2023-01-01 | End: 2023-01-01 | Stop reason: HOSPADM

## 2023-01-01 RX ORDER — QUETIAPINE FUMARATE 25 MG/1
12.5 TABLET, FILM COATED ORAL NIGHTLY PRN
Status: DISCONTINUED | OUTPATIENT
Start: 2023-01-01 | End: 2023-01-01 | Stop reason: HOSPADM

## 2023-01-01 RX ORDER — HYDROCODONE BITARTRATE AND ACETAMINOPHEN 10; 325 MG/1; MG/1
TABLET ORAL
COMMUNITY
End: 2023-01-01

## 2023-01-01 RX ORDER — LISINOPRIL 20 MG/1
20 TABLET ORAL 2 TIMES DAILY
Qty: 200 TABLET | Refills: 0 | Status: SHIPPED
Start: 2023-01-01 | End: 2023-01-01

## 2023-01-01 RX ORDER — AMOXICILLIN 250 MG
2 CAPSULE ORAL 2 TIMES DAILY
Status: DISCONTINUED | OUTPATIENT
Start: 2023-01-01 | End: 2023-01-01 | Stop reason: HOSPADM

## 2023-01-01 RX ORDER — DIPHENHYDRAMINE HYDROCHLORIDE 50 MG/ML
12.5 INJECTION INTRAMUSCULAR; INTRAVENOUS ONCE
Status: COMPLETED | OUTPATIENT
Start: 2023-01-01 | End: 2023-01-01

## 2023-01-01 RX ORDER — POLYETHYLENE GLYCOL 3350 17 G/17G
1 POWDER, FOR SOLUTION ORAL
Status: DISCONTINUED | OUTPATIENT
Start: 2023-01-01 | End: 2023-01-01 | Stop reason: HOSPADM

## 2023-01-01 RX ORDER — ONDANSETRON 4 MG/1
4 TABLET, ORALLY DISINTEGRATING ORAL EVERY 6 HOURS PRN
Qty: 10 TABLET | Refills: 1 | Status: SHIPPED
Start: 2023-01-01 | End: 2023-01-01

## 2023-01-01 RX ORDER — OMEPRAZOLE 20 MG/1
20 CAPSULE, DELAYED RELEASE ORAL 2 TIMES DAILY
Qty: 60 CAPSULE | Refills: 1 | Status: SHIPPED | OUTPATIENT
Start: 2023-01-01 | End: 2023-01-01

## 2023-01-01 RX ORDER — METOPROLOL TARTRATE 1 MG/ML
1 INJECTION, SOLUTION INTRAVENOUS
Status: DISCONTINUED | OUTPATIENT
Start: 2023-01-01 | End: 2023-01-01 | Stop reason: HOSPADM

## 2023-01-01 RX ORDER — LABETALOL HYDROCHLORIDE 5 MG/ML
10 INJECTION, SOLUTION INTRAVENOUS EVERY 4 HOURS PRN
Status: DISCONTINUED | OUTPATIENT
Start: 2023-01-01 | End: 2023-01-01 | Stop reason: HOSPADM

## 2023-01-01 RX ORDER — M-VIT,TX,IRON,MINS/CALC/FOLIC 27MG-0.4MG
1 TABLET ORAL DAILY
Status: DISCONTINUED | OUTPATIENT
Start: 2023-01-01 | End: 2023-01-01 | Stop reason: HOSPADM

## 2023-01-01 RX ORDER — DULOXETIN HYDROCHLORIDE 30 MG/1
30 CAPSULE, DELAYED RELEASE ORAL EVERY EVENING
Status: DISCONTINUED | OUTPATIENT
Start: 2023-01-01 | End: 2023-01-01 | Stop reason: HOSPADM

## 2023-01-01 RX ORDER — TIZANIDINE 4 MG/1
2 TABLET ORAL
Status: DISCONTINUED | OUTPATIENT
Start: 2023-01-01 | End: 2023-01-01 | Stop reason: HOSPADM

## 2023-01-01 RX ORDER — POTASSIUM CHLORIDE 7.45 MG/ML
10 INJECTION INTRAVENOUS
Status: COMPLETED | OUTPATIENT
Start: 2023-01-01 | End: 2023-01-01

## 2023-01-01 RX ORDER — LISINOPRIL 10 MG/1
10 TABLET ORAL 2 TIMES DAILY
Status: DISCONTINUED | OUTPATIENT
Start: 2023-01-01 | End: 2023-01-01 | Stop reason: HOSPADM

## 2023-01-01 RX ORDER — OMEPRAZOLE 40 MG/1
40 CAPSULE, DELAYED RELEASE ORAL 2 TIMES DAILY
Qty: 60 CAPSULE | Refills: 0 | Status: SHIPPED | OUTPATIENT
Start: 2023-01-01 | End: 2023-01-01

## 2023-01-01 RX ORDER — DULOXETIN HYDROCHLORIDE 60 MG/1
60 CAPSULE, DELAYED RELEASE ORAL EVERY MORNING
Qty: 90 CAPSULE | Refills: 0 | Status: SHIPPED
Start: 2023-01-01 | End: 2023-01-01

## 2023-01-01 RX ORDER — POTASSIUM CHLORIDE 20 MEQ/1
40 TABLET, EXTENDED RELEASE ORAL ONCE
Status: COMPLETED | OUTPATIENT
Start: 2023-01-01 | End: 2023-01-01

## 2023-01-01 RX ORDER — TRAMADOL HYDROCHLORIDE 50 MG/1
50 TABLET ORAL EVERY 6 HOURS PRN
Qty: 90 TABLET | Refills: 0 | Status: SHIPPED | OUTPATIENT
Start: 2023-01-01 | End: 2023-01-01

## 2023-01-01 RX ORDER — ONDANSETRON 4 MG/1
4 TABLET, ORALLY DISINTEGRATING ORAL EVERY 4 HOURS PRN
Status: DISCONTINUED | OUTPATIENT
Start: 2023-01-01 | End: 2023-01-01

## 2023-01-01 RX ORDER — HALOPERIDOL 5 MG/ML
1 INJECTION INTRAMUSCULAR
Status: DISCONTINUED | OUTPATIENT
Start: 2023-01-01 | End: 2023-01-01 | Stop reason: HOSPADM

## 2023-01-01 RX ORDER — CEFTRIAXONE 1 G/1
1000 INJECTION, POWDER, FOR SOLUTION INTRAMUSCULAR; INTRAVENOUS ONCE
Status: COMPLETED | OUTPATIENT
Start: 2023-01-01 | End: 2023-01-01

## 2023-01-01 RX ORDER — ONDANSETRON 2 MG/ML
4 INJECTION INTRAMUSCULAR; INTRAVENOUS
Status: DISCONTINUED | OUTPATIENT
Start: 2023-01-01 | End: 2023-01-01 | Stop reason: HOSPADM

## 2023-01-01 RX ORDER — DIPHENHYDRAMINE HYDROCHLORIDE 50 MG/ML
12.5 INJECTION INTRAMUSCULAR; INTRAVENOUS
Status: DISCONTINUED | OUTPATIENT
Start: 2023-01-01 | End: 2023-01-01 | Stop reason: HOSPADM

## 2023-01-01 RX ORDER — HYDRALAZINE HYDROCHLORIDE 20 MG/ML
5 INJECTION INTRAMUSCULAR; INTRAVENOUS
Status: DISCONTINUED | OUTPATIENT
Start: 2023-01-01 | End: 2023-01-01 | Stop reason: HOSPADM

## 2023-01-01 RX ORDER — ONDANSETRON 2 MG/ML
4 INJECTION INTRAMUSCULAR; INTRAVENOUS EVERY 4 HOURS PRN
Status: DISCONTINUED | OUTPATIENT
Start: 2023-01-01 | End: 2023-01-01

## 2023-01-01 RX ORDER — IPRATROPIUM BROMIDE AND ALBUTEROL SULFATE 2.5; .5 MG/3ML; MG/3ML
3 SOLUTION RESPIRATORY (INHALATION)
Status: DISCONTINUED | OUTPATIENT
Start: 2023-01-01 | End: 2023-01-01 | Stop reason: HOSPADM

## 2023-01-01 RX ORDER — ONDANSETRON 2 MG/ML
INJECTION INTRAMUSCULAR; INTRAVENOUS
Status: DISCONTINUED
Start: 2023-01-01 | End: 2023-01-01 | Stop reason: HOSPADM

## 2023-01-01 RX ORDER — SODIUM CHLORIDE AND POTASSIUM CHLORIDE 150; 900 MG/100ML; MG/100ML
INJECTION, SOLUTION INTRAVENOUS CONTINUOUS
Status: DISCONTINUED | OUTPATIENT
Start: 2023-01-01 | End: 2023-01-01

## 2023-01-01 RX ORDER — BISACODYL 10 MG
10 SUPPOSITORY, RECTAL RECTAL
Status: DISCONTINUED | OUTPATIENT
Start: 2023-01-01 | End: 2023-01-01 | Stop reason: HOSPADM

## 2023-01-01 RX ORDER — OXYCODONE HYDROCHLORIDE 5 MG/1
5 TABLET ORAL
Status: DISCONTINUED | OUTPATIENT
Start: 2023-01-01 | End: 2023-01-01 | Stop reason: HOSPADM

## 2023-01-01 RX ORDER — LORAZEPAM 2 MG/ML
0.5 INJECTION INTRAMUSCULAR ONCE
Status: COMPLETED | OUTPATIENT
Start: 2023-01-01 | End: 2023-01-01

## 2023-01-01 RX ORDER — HYDROCODONE BITARTRATE AND ACETAMINOPHEN 5; 325 MG/1; MG/1
1 TABLET ORAL ONCE
Status: COMPLETED | OUTPATIENT
Start: 2023-01-01 | End: 2023-01-01

## 2023-01-01 RX ORDER — DULOXETIN HYDROCHLORIDE 30 MG/1
60 CAPSULE, DELAYED RELEASE ORAL EVERY MORNING
Status: DISCONTINUED | OUTPATIENT
Start: 2023-01-01 | End: 2023-01-01 | Stop reason: HOSPADM

## 2023-01-01 RX ORDER — LEVOTHYROXINE SODIUM 0.05 MG/1
50 TABLET ORAL
Status: DISCONTINUED | OUTPATIENT
Start: 2023-01-01 | End: 2023-01-01 | Stop reason: HOSPADM

## 2023-01-01 RX ORDER — SODIUM CHLORIDE 9 MG/ML
INJECTION, SOLUTION INTRAVENOUS CONTINUOUS
Status: CANCELLED | OUTPATIENT
Start: 2023-01-01

## 2023-01-01 RX ORDER — SODIUM CHLORIDE, SODIUM LACTATE, POTASSIUM CHLORIDE, CALCIUM CHLORIDE 600; 310; 30; 20 MG/100ML; MG/100ML; MG/100ML; MG/100ML
INJECTION, SOLUTION INTRAVENOUS CONTINUOUS
Status: DISCONTINUED | OUTPATIENT
Start: 2023-01-01 | End: 2023-01-01 | Stop reason: HOSPADM

## 2023-01-01 RX ORDER — HYDROCODONE BITARTRATE AND ACETAMINOPHEN 7.5; 325 MG/1; MG/1
TABLET ORAL
COMMUNITY
End: 2023-01-01

## 2023-01-01 RX ADMIN — Medication 100 MG: at 04:44

## 2023-01-01 RX ADMIN — HYDROCODONE BITARTRATE AND ACETAMINOPHEN 1 TABLET: 5; 325 TABLET ORAL at 14:17

## 2023-01-01 RX ADMIN — QUETIAPINE FUMARATE 12.5 MG: 25 TABLET ORAL at 23:12

## 2023-01-01 RX ADMIN — LORAZEPAM 0.5 MG: 2 INJECTION INTRAMUSCULAR; INTRAVENOUS at 05:52

## 2023-01-01 RX ADMIN — PANTOPRAZOLE SODIUM 40 MG: 40 INJECTION, POWDER, FOR SOLUTION INTRAVENOUS at 05:15

## 2023-01-01 RX ADMIN — SODIUM CHLORIDE, POTASSIUM CHLORIDE, SODIUM LACTATE AND CALCIUM CHLORIDE: 600; 310; 30; 20 INJECTION, SOLUTION INTRAVENOUS at 09:11

## 2023-01-01 RX ADMIN — POTASSIUM CHLORIDE AND SODIUM CHLORIDE: 900; 300 INJECTION, SOLUTION INTRAVENOUS at 10:10

## 2023-01-01 RX ADMIN — LEVOTHYROXINE SODIUM 50 MCG: 0.05 TABLET ORAL at 05:01

## 2023-01-01 RX ADMIN — SODIUM CHLORIDE 500 ML: 9 INJECTION, SOLUTION INTRAVENOUS at 05:00

## 2023-01-01 RX ADMIN — LISINOPRIL 10 MG: 10 TABLET ORAL at 04:48

## 2023-01-01 RX ADMIN — DOCUSATE SODIUM 50 MG AND SENNOSIDES 8.6 MG 2 TABLET: 8.6; 5 TABLET, FILM COATED ORAL at 17:17

## 2023-01-01 RX ADMIN — LISINOPRIL 10 MG: 10 TABLET ORAL at 17:16

## 2023-01-01 RX ADMIN — SODIUM CHLORIDE 500 ML: 9 INJECTION, SOLUTION INTRAVENOUS at 18:52

## 2023-01-01 RX ADMIN — OMEPRAZOLE 20 MG: 20 CAPSULE, DELAYED RELEASE ORAL at 08:45

## 2023-01-01 RX ADMIN — Medication 100 MG: at 15:43

## 2023-01-01 RX ADMIN — SODIUM CHLORIDE: 9 INJECTION, SOLUTION INTRAVENOUS at 06:41

## 2023-01-01 RX ADMIN — ACETAMINOPHEN 650 MG: 325 TABLET, FILM COATED ORAL at 12:33

## 2023-01-01 RX ADMIN — DOCUSATE SODIUM 50 MG AND SENNOSIDES 8.6 MG 2 TABLET: 8.6; 5 TABLET, FILM COATED ORAL at 05:00

## 2023-01-01 RX ADMIN — DOCUSATE SODIUM 50 MG AND SENNOSIDES 8.6 MG 2 TABLET: 8.6; 5 TABLET, FILM COATED ORAL at 17:58

## 2023-01-01 RX ADMIN — DULOXETINE HYDROCHLORIDE 30 MG: 30 CAPSULE, DELAYED RELEASE ORAL at 16:44

## 2023-01-01 RX ADMIN — DULOXETINE HYDROCHLORIDE 30 MG: 30 CAPSULE, DELAYED RELEASE ORAL at 17:39

## 2023-01-01 RX ADMIN — IOHEXOL 80 ML: 350 INJECTION, SOLUTION INTRAVENOUS at 01:45

## 2023-01-01 RX ADMIN — LISINOPRIL 10 MG: 10 TABLET ORAL at 17:57

## 2023-01-01 RX ADMIN — DULOXETINE HYDROCHLORIDE 60 MG: 60 CAPSULE, DELAYED RELEASE ORAL at 04:50

## 2023-01-01 RX ADMIN — POTASSIUM CHLORIDE AND SODIUM CHLORIDE: 900; 150 INJECTION, SOLUTION INTRAVENOUS at 07:45

## 2023-01-01 RX ADMIN — PANTOPRAZOLE SODIUM 40 MG: 40 INJECTION, POWDER, FOR SOLUTION INTRAVENOUS at 05:58

## 2023-01-01 RX ADMIN — LISINOPRIL 10 MG: 10 TABLET ORAL at 16:44

## 2023-01-01 RX ADMIN — POTASSIUM CHLORIDE 10 MEQ: 7.46 INJECTION, SOLUTION INTRAVENOUS at 15:43

## 2023-01-01 RX ADMIN — PANTOPRAZOLE SODIUM 40 MG: 40 INJECTION, POWDER, FOR SOLUTION INTRAVENOUS at 16:45

## 2023-01-01 RX ADMIN — DULOXETINE HYDROCHLORIDE 30 MG: 30 CAPSULE, DELAYED RELEASE ORAL at 17:57

## 2023-01-01 RX ADMIN — SODIUM CHLORIDE: 9 INJECTION, SOLUTION INTRAVENOUS at 05:34

## 2023-01-01 RX ADMIN — LISINOPRIL 10 MG: 10 TABLET ORAL at 05:04

## 2023-01-01 RX ADMIN — POTASSIUM CHLORIDE 40 MEQ: 1500 TABLET, EXTENDED RELEASE ORAL at 05:15

## 2023-01-01 RX ADMIN — IOHEXOL 90 ML: 350 INJECTION, SOLUTION INTRAVENOUS at 05:49

## 2023-01-01 RX ADMIN — PANTOPRAZOLE SODIUM 40 MG: 40 INJECTION, POWDER, FOR SOLUTION INTRAVENOUS at 07:47

## 2023-01-01 RX ADMIN — LISINOPRIL 10 MG: 10 TABLET ORAL at 05:15

## 2023-01-01 RX ADMIN — DULOXETINE HYDROCHLORIDE 60 MG: 60 CAPSULE, DELAYED RELEASE ORAL at 05:04

## 2023-01-01 RX ADMIN — LABETALOL HYDROCHLORIDE 10 MG: 5 INJECTION INTRAVENOUS at 04:42

## 2023-01-01 RX ADMIN — LABETALOL HYDROCHLORIDE 10 MG: 5 INJECTION INTRAVENOUS at 05:02

## 2023-01-01 RX ADMIN — QUETIAPINE FUMARATE 12.5 MG: 25 TABLET ORAL at 20:09

## 2023-01-01 RX ADMIN — MULTIPLE VITAMINS W/ MINERALS TAB 1 TABLET: TAB at 04:44

## 2023-01-01 RX ADMIN — MULTIPLE VITAMINS W/ MINERALS TAB 1 TABLET: TAB at 05:05

## 2023-01-01 RX ADMIN — SODIUM CHLORIDE: 9 INJECTION, SOLUTION INTRAVENOUS at 12:39

## 2023-01-01 RX ADMIN — DULOXETINE HYDROCHLORIDE 60 MG: 60 CAPSULE, DELAYED RELEASE ORAL at 05:15

## 2023-01-01 RX ADMIN — LEVOTHYROXINE SODIUM 50 MCG: 0.05 TABLET ORAL at 04:44

## 2023-01-01 RX ADMIN — PANTOPRAZOLE SODIUM 40 MG: 40 INJECTION, POWDER, FOR SOLUTION INTRAVENOUS at 04:43

## 2023-01-01 RX ADMIN — PANTOPRAZOLE SODIUM 40 MG: 40 INJECTION, POWDER, FOR SOLUTION INTRAVENOUS at 17:39

## 2023-01-01 RX ADMIN — DOCUSATE SODIUM 50 MG AND SENNOSIDES 8.6 MG 2 TABLET: 8.6; 5 TABLET, FILM COATED ORAL at 05:16

## 2023-01-01 RX ADMIN — SODIUM CHLORIDE: 9 INJECTION, SOLUTION INTRAVENOUS at 01:52

## 2023-01-01 RX ADMIN — PANTOPRAZOLE SODIUM 40 MG: 40 INJECTION, POWDER, FOR SOLUTION INTRAVENOUS at 05:00

## 2023-01-01 RX ADMIN — DOCUSATE SODIUM 50 MG AND SENNOSIDES 8.6 MG 2 TABLET: 8.6; 5 TABLET, FILM COATED ORAL at 04:44

## 2023-01-01 RX ADMIN — CEFTRIAXONE SODIUM 1000 MG: 1 INJECTION, POWDER, FOR SOLUTION INTRAMUSCULAR; INTRAVENOUS at 05:53

## 2023-01-01 RX ADMIN — PANTOPRAZOLE SODIUM 40 MG: 40 INJECTION, POWDER, FOR SOLUTION INTRAVENOUS at 17:17

## 2023-01-01 RX ADMIN — LISINOPRIL 10 MG: 10 TABLET ORAL at 05:01

## 2023-01-01 RX ADMIN — Medication 100 MG: at 05:05

## 2023-01-01 RX ADMIN — FOLIC ACID 1 MG: 1 TABLET ORAL at 15:43

## 2023-01-01 RX ADMIN — LEVOTHYROXINE SODIUM 50 MCG: 0.05 TABLET ORAL at 05:15

## 2023-01-01 RX ADMIN — FOLIC ACID 1 MG: 1 TABLET ORAL at 04:44

## 2023-01-01 RX ADMIN — DIPHENHYDRAMINE HYDROCHLORIDE 12.5 MG: 50 INJECTION, SOLUTION INTRAMUSCULAR; INTRAVENOUS at 01:16

## 2023-01-01 RX ADMIN — DULOXETINE HYDROCHLORIDE 60 MG: 60 CAPSULE, DELAYED RELEASE ORAL at 05:01

## 2023-01-01 RX ADMIN — POTASSIUM CHLORIDE 10 MEQ: 7.46 INJECTION, SOLUTION INTRAVENOUS at 16:48

## 2023-01-01 RX ADMIN — LEVOTHYROXINE SODIUM 50 MCG: 0.05 TABLET ORAL at 05:04

## 2023-01-01 RX ADMIN — MULTIPLE VITAMINS W/ MINERALS TAB 1 TABLET: TAB at 15:43

## 2023-01-01 RX ADMIN — PROPOFOL 20 MG: 10 INJECTION, EMULSION INTRAVENOUS at 09:21

## 2023-01-01 RX ADMIN — DULOXETINE HYDROCHLORIDE 30 MG: 30 CAPSULE, DELAYED RELEASE ORAL at 17:16

## 2023-01-01 RX ADMIN — LISINOPRIL 10 MG: 10 TABLET ORAL at 17:39

## 2023-01-01 RX ADMIN — LABETALOL HYDROCHLORIDE 10 MG: 5 INJECTION INTRAVENOUS at 07:50

## 2023-01-01 RX ADMIN — PROPOFOL 50 MG: 10 INJECTION, EMULSION INTRAVENOUS at 09:19

## 2023-01-01 RX ADMIN — FOLIC ACID 1 MG: 1 TABLET ORAL at 05:05

## 2023-01-01 RX ADMIN — DOCUSATE SODIUM 50 MG AND SENNOSIDES 8.6 MG 2 TABLET: 8.6; 5 TABLET, FILM COATED ORAL at 05:03

## 2023-01-01 SDOH — ECONOMIC STABILITY: FOOD INSECURITY: WITHIN THE PAST 12 MONTHS, YOU WORRIED THAT YOUR FOOD WOULD RUN OUT BEFORE YOU GOT MONEY TO BUY MORE.: SOMETIMES TRUE

## 2023-01-01 SDOH — ECONOMIC STABILITY: HOUSING INSECURITY: IN THE LAST 12 MONTHS, HOW MANY PLACES HAVE YOU LIVED?: 1

## 2023-01-01 SDOH — ECONOMIC STABILITY: FOOD INSECURITY: WITHIN THE PAST 12 MONTHS, THE FOOD YOU BOUGHT JUST DIDN'T LAST AND YOU DIDN'T HAVE MONEY TO GET MORE.: SOMETIMES TRUE

## 2023-01-01 SDOH — HEALTH STABILITY: PHYSICAL HEALTH: ON AVERAGE, HOW MANY MINUTES DO YOU ENGAGE IN EXERCISE AT THIS LEVEL?: 0 MIN

## 2023-01-01 SDOH — ECONOMIC STABILITY: HOUSING INSECURITY
HOME SAFETY: SN EDUCATED PT/CG IN REGARDS TO FALL PREVENTION USING HANDOUT IN WHITE BINDER. SN ENCOURAGED PT/CG TO OBTAIN FIRE EXTINGUISHER, IF POSSIBLE FOR FIRE SAFETY. WALL TO WALL CARPET IS LOOSE, EDUCATED PT/CG IN REGARDS TO FALL PREVENTION AND POSSIBLY HAVE

## 2023-01-01 SDOH — ECONOMIC STABILITY: INCOME INSECURITY: IN THE LAST 12 MONTHS, WAS THERE A TIME WHEN YOU WERE NOT ABLE TO PAY THE MORTGAGE OR RENT ON TIME?: NO

## 2023-01-01 SDOH — HEALTH STABILITY: PHYSICAL HEALTH: ON AVERAGE, HOW MANY DAYS PER WEEK DO YOU ENGAGE IN MODERATE TO STRENUOUS EXERCISE (LIKE A BRISK WALK)?: 0 DAYS

## 2023-01-01 SDOH — ECONOMIC STABILITY: HOUSING INSECURITY
IN THE LAST 12 MONTHS, WAS THERE A TIME WHEN YOU DID NOT HAVE A STEADY PLACE TO SLEEP OR SLEPT IN A SHELTER (INCLUDING NOW)?: NO

## 2023-01-01 SDOH — ECONOMIC STABILITY: HOUSING INSECURITY: HOME SAFETY: CARPET STRETCHED OUT IF POSSIBLE.

## 2023-01-01 ASSESSMENT — PAIN DESCRIPTION - PAIN TYPE
TYPE: ACUTE PAIN

## 2023-01-01 ASSESSMENT — COGNITIVE AND FUNCTIONAL STATUS - GENERAL
DRESSING REGULAR UPPER BODY CLOTHING: A LOT
CLIMB 3 TO 5 STEPS WITH RAILING: A LOT
MOVING FROM LYING ON BACK TO SITTING ON SIDE OF FLAT BED: A LITTLE
WALKING IN HOSPITAL ROOM: A LOT
STANDING UP FROM CHAIR USING ARMS: A LOT
PERSONAL GROOMING: A LOT
MOBILITY SCORE: 23
DAILY ACTIVITIY SCORE: 12
SUGGESTED CMS G CODE MODIFIER MOBILITY: CI
SUGGESTED CMS G CODE MODIFIER DAILY ACTIVITY: CL
DAILY ACTIVITIY SCORE: 14
TOILETING: A LOT
SUGGESTED CMS G CODE MODIFIER MOBILITY: CK
HELP NEEDED FOR BATHING: A LOT
EATING MEALS: A LITTLE
DRESSING REGULAR LOWER BODY CLOTHING: A LOT
EATING MEALS: A LOT
DRESSING REGULAR UPPER BODY CLOTHING: A LITTLE
MOVING TO AND FROM BED TO CHAIR: A LITTLE
STANDING UP FROM CHAIR USING ARMS: A LITTLE
CLIMB 3 TO 5 STEPS WITH RAILING: A LOT
WALKING IN HOSPITAL ROOM: A LITTLE
SUGGESTED CMS G CODE MODIFIER MOBILITY: CL
SUGGESTED CMS G CODE MODIFIER DAILY ACTIVITY: CK
HELP NEEDED FOR BATHING: A LOT
PERSONAL GROOMING: A LOT
MOBILITY SCORE: 18
CLIMB 3 TO 5 STEPS WITH RAILING: A LITTLE
TOILETING: A LOT
MOVING FROM LYING ON BACK TO SITTING ON SIDE OF FLAT BED: A LOT
MOBILITY SCORE: 12
TURNING FROM BACK TO SIDE WHILE IN FLAT BAD: A LOT
DRESSING REGULAR LOWER BODY CLOTHING: A LOT
MOVING TO AND FROM BED TO CHAIR: A LOT

## 2023-01-01 ASSESSMENT — ENCOUNTER SYMPTOMS
VOMITING: 0
MYALGIAS: 0
COUGH: 0
SHORTNESS OF BREATH: 1
NAUSEA: 0
CHILLS: 0
ORTHOPNEA: 0
CLAUDICATION: 0
STOOL FREQUENCY: LESS THAN DAILY
MUSCLE WEAKNESS: 1
NEUROLOGICAL NEGATIVE: 1
CLAUDICATION: 0
BLURRED VISION: 0
ABDOMINAL PAIN: 0
PAIN LOCATION - RELIEVING FACTORS: REST, PAIN MEDS
SPUTUM PRODUCTION: 0
CHILLS: 0
VOMITING: 0
CONSTIPATION: 0
FEVER: 0
NECK PAIN: 0
DOUBLE VISION: 0
SHORTNESS OF BREATH: 0
NAUSEA: 0
PERSON REPORTING PAIN: PATIENT
HEMOPTYSIS: 0
PAIN LOCATION - PAIN FREQUENCY: FREQUENT
CHILLS: 0
COUGH: 0
CONSTIPATION: 0
DIARRHEA: 0
PAIN: 1
WEIGHT LOSS: 0
DIFFICULTY THINKING: 1
VOMITING: PT DENIES
SPEECH CHANGE: 0
BOWEL PATTERN NORMAL: 1
PALPITATIONS: 0
BOWEL PATTERN NORMAL: 1
WEAKNESS: 0
FATIGUES EASILY: 1
HEMOPTYSIS: 0
DENIES PAIN: 1
STOOL FREQUENCY: LESS THAN DAILY
PHOTOPHOBIA: 0
PERSON REPORTING PAIN: PATIENT
NAUSEA: DENIES ANY
EYES NEGATIVE: 1
PERSON REPORTING PAIN: PATIENT
PERSON REPORTING PAIN: PATIENT
HYPERTENSION: 1
DOUBLE VISION: 0
PAIN: DENIES ANY PAIN OR DISCOMFORT AT TIME OF NURSING VISIT.
GASTROINTESTINAL NEGATIVE: 1
LOWEST PAIN SEVERITY IN PAST 24 HOURS: 0/10
MUSCLE WEAKNESS: 1
NAUSEA: PT DENIES
DIZZINESS: 0
HIGHEST PAIN SEVERITY IN PAST 24 HOURS: 5/10
CONSTIPATION: 0
STOOL FREQUENCY: LESS THAN DAILY
APNEA: 0
NAUSEA: DENIES
POOR JUDGMENT: 1
DENIES PAIN: 1
LIMITED RANGE OF MOTION: 1
DENIES PAIN: 1
HEMOPTYSIS: 0
PERSON REPORTING PAIN: PATIENT
COUGH: 0
COUGH: 0
STOOL FREQUENCY: LESS THAN DAILY
PAIN LOCATION - PAIN SEVERITY: 0/10
DYSPNEA ON EXERTION: 1
PERSON REPORTING PAIN: PATIENT
WEAKNESS: 0
DENIES PAIN: 1
NAUSEA: DENIES
NAUSEA: 0
CONSTITUTIONAL NEGATIVE: 1
ORTHOPNEA: 0
MYALGIAS: 0
LAST BOWEL MOVEMENT: 66779
PALPITATIONS: 0
PHOTOPHOBIA: 0
MYALGIAS: 0
STOOL FREQUENCY: LESS THAN DAILY
VOMITING: 0
MUSCLE WEAKNESS: 1
WEIGHT LOSS: 0
CHOKING: 0
PSYCHIATRIC NEGATIVE: 1
PHOTOPHOBIA: 0
BOWEL PATTERN COMMENTS: CHRONIC CONSTIPATION
FEVER: 0
SHORTNESS OF BREATH: 0
PERSON REPORTING PAIN: PATIENT
MUSCLE WEAKNESS: 1
DENIES PAIN: 1
LAST BOWEL MOVEMENT: 66782
HYPERTENSION: 1
ORTHOPNEA: 0
FEVER: 0
BOWEL PATTERN NORMAL: 1
CONSTIPATION: 1
DIARRHEA: 0
DIZZINESS: 0
ABDOMINAL PAIN: 0
PAIN LOCATION: GENERALIZED
SPEECH CHANGE: 0
LAST BOWEL MOVEMENT: 66768
NECK PAIN: 0
CLAUDICATION: 0
BOWEL PATTERN NORMAL: 1
BLURRED VISION: 0
ABDOMINAL PAIN: 0
DIZZINESS: 0
PAIN LOCATION - PAIN DURATION: CHRONIC
LOSS OF SENSATION: 1
CHEST TIGHTNESS: 0
WEIGHT LOSS: 0
WEAKNESS: 0
DEPRESSED MOOD: 1
SPEECH CHANGE: 0
CONSTIPATION: 1
DIARRHEA: 0
FEVER: 0
LAST BOWEL MOVEMENT: 66776
NAUSEA: DENIES ANY
VOMITING: DENIES
SUBJECTIVE PAIN PROGRESSION: WAXING AND WANING
DIFFICULTY THINKING: 1
PAIN SEVERITY GOAL: 0/10
STRIDOR: 0
LAST BOWEL MOVEMENT: 66771
DENIES PAIN: 1
BLURRED VISION: 0
PERSON REPORTING PAIN: PATIENT
PALPITATIONS: 0
MUSCLE WEAKNESS: 1
DOUBLE VISION: 0
VOMITING: DENIES
NECK PAIN: 0
DENIES PAIN: 1
POOR JUDGMENT: 1
DYSPNEA ACTIVITY LEVEL: AFTER AMBULATING MORE THAN 20 FT
PALPITATIONS: 0
MUSCULOSKELETAL NEGATIVE: 1
PAIN LOCATION - PAIN QUALITY: ACHY

## 2023-01-01 ASSESSMENT — ACTIVITIES OF DAILY LIVING (ADL)
FEEDING: INDEPENDENT
DRESSING_UB_CURRENT_FUNCTION: MINIMUM ASSIST
AMBULATION ASSISTANCE: STAND BY ASSIST
AMBULATION ASSISTANCE ON FLAT SURFACES: 1
TOILETING: 1
BATHING_CURRENT_FUNCTION: SUPERVISION
BATHING_CURRENT_FUNCTION: ONE PERSON
PHYSICAL TRANSFERS ASSESSED: 1
AMBULATION ASSISTANCE: ONE PERSON
TRANSPORTATION COMMENTS: PT NEEDS ASSISTANCE TO LEAVE HOME
AMBULATION ASSISTANCE: 1
TOILETING: 1
AMBULATION ASSISTANCE: CONTACT GUARD ASSIST
GROOMING_CURRENT_FUNCTION: INDEPENDENT
CURRENT_FUNCTION: STAND BY ASSIST
BATHING ASSESSED: 1
CURRENT_FUNCTION: MINIMUM ASSIST
GROOMING_CURRENT_FUNCTION: SUPERVISION
TOILETING: INDEPENDENT
BATHING_CURRENT_FUNCTION: MODERATE ASSIST
DRESSING_UB_CURRENT_FUNCTION: INDEPENDENT
TOILETING: STAND BY ASSIST
PHYSICAL TRANSFERS ASSESSED: 1
TELEPHONE USE ASSESSED: 1
DRESSING_LB_CURRENT_FUNCTION: INDEPENDENT
AMBULATION_DISTANCE/DURATION_TOLERATED: 75'
GROOMING ASSESSED: 1
ORAL_CARE_CURRENT_FUNCTION: INDEPENDENT
AMBULATION ASSISTANCE: 1
OASIS_M1830: 03
FEEDING ASSESSED: 1
BATHING ASSESSED: 1
USING THE TELPHONE: NEEDS ASSISTANCE
GROOMING ASSESSED: 1
CURRENT_FUNCTION: STAND BY ASSIST
DRESSING_LB_CURRENT_FUNCTION: MINIMUM ASSIST
ORAL_CARE_ASSESSED: 1
TOILETING: ONE PERSON
AMBULATION ASSISTANCE: CONTACT GUARD ASSIST

## 2023-01-01 ASSESSMENT — PATIENT HEALTH QUESTIONNAIRE - PHQ9
2. FEELING DOWN, DEPRESSED, IRRITABLE, OR HOPELESS: SEVERAL DAYS
9. THOUGHTS THAT YOU WOULD BE BETTER OFF DEAD, OR OF HURTING YOURSELF: SEVERAL DAYS
4. FEELING TIRED OR HAVING LITTLE ENERGY: SEVERAL DAYS
CLINICAL INTERPRETATION OF PHQ2 SCORE: 0
7. TROUBLE CONCENTRATING ON THINGS, SUCH AS READING THE NEWSPAPER OR WATCHING TELEVISION: SEVERAL DAYS
5. POOR APPETITE OR OVEREATING: SEVERAL DAYS
3. TROUBLE FALLING OR STAYING ASLEEP OR SLEEPING TOO MUCH: SEVERAL DAYS
6. FEELING BAD ABOUT YOURSELF - OR THAT YOU ARE A FAILURE OR HAVE LET YOURSELF OR YOUR FAMILY DOWN: SEVERAL DAYS
CLINICAL INTERPRETATION OF PHQ2 SCORE: 0
1. LITTLE INTEREST OR PLEASURE IN DOING THINGS: SEVERAL DAYS
SUM OF ALL RESPONSES TO PHQ9 QUESTIONS 1 AND 2: 2

## 2023-01-01 ASSESSMENT — GAIT ASSESSMENTS
DISTANCE (FEET): 150
GAIT LEVEL OF ASSIST: SUPERVISED
ASSISTIVE DEVICE: FRONT WHEEL WALKER

## 2023-01-01 ASSESSMENT — FIBROSIS 4 INDEX
FIB4 SCORE: 2.04
FIB4 SCORE: 1.85
FIB4 SCORE: 1.513646015538319179
FIB4 SCORE: 2.020725942163690176
FIB4 SCORE: 2.04
FIB4 SCORE: 0.96
FIB4 SCORE: 1
FIB4 SCORE: 1.52
FIB4 SCORE: 2.020725942163690176
FIB4 SCORE: 1.85
FIB4 SCORE: 1.69
FIB4 SCORE: 2.020725942163690176
FIB4 SCORE: 1.513646015538319179

## 2023-01-01 ASSESSMENT — SOCIAL DETERMINANTS OF HEALTH (SDOH)
HOW OFTEN DO YOU ATTEND CHURCH OR RELIGIOUS SERVICES?: NEVER
HOW OFTEN DO YOU ATTENT MEETINGS OF THE CLUB OR ORGANIZATION YOU BELONG TO?: NEVER
WITHIN THE LAST YEAR, HAVE YOU BEEN AFRAID OF YOUR PARTNER OR EX-PARTNER?: PATIENT DECLINED
WITHIN THE LAST YEAR, HAVE TO BEEN RAPED OR FORCED TO HAVE ANY KIND OF SEXUAL ACTIVITY BY YOUR PARTNER OR EX-PARTNER?: PATIENT DECLINED
HOW HARD IS IT FOR YOU TO PAY FOR THE VERY BASICS LIKE FOOD, HOUSING, MEDICAL CARE, AND HEATING?: SOMEWHAT HARD
WITHIN THE LAST YEAR, HAVE YOU BEEN KICKED, HIT, SLAPPED, OR OTHERWISE PHYSICALLY HURT BY YOUR PARTNER OR EX-PARTNER?: PATIENT DECLINED
HOW OFTEN DO YOU GET TOGETHER WITH FRIENDS OR RELATIVES?: MORE THAN THREE TIMES A WEEK
WITHIN THE LAST YEAR, HAVE YOU BEEN HUMILIATED OR EMOTIONALLY ABUSED IN OTHER WAYS BY YOUR PARTNER OR EX-PARTNER?: PATIENT DECLINED
IN A TYPICAL WEEK, HOW MANY TIMES DO YOU TALK ON THE PHONE WITH FAMILY, FRIENDS, OR NEIGHBORS?: MORE THAN THREE TIMES A WEEK
DO YOU BELONG TO ANY CLUBS OR ORGANIZATIONS SUCH AS CHURCH GROUPS UNIONS, FRATERNAL OR ATHLETIC GROUPS, OR SCHOOL GROUPS?: NO

## 2023-01-01 ASSESSMENT — LIFESTYLE VARIABLES
CONSUMPTION TOTAL: NEGATIVE
DOES PATIENT WANT TO STOP DRINKING: CANNOT ASSESS
HOW OFTEN DO YOU HAVE SIX OR MORE DRINKS ON ONE OCCASION: NEVER
EVER HAD A DRINK FIRST THING IN THE MORNING TO STEADY YOUR NERVES TO GET RID OF A HANGOVER: NO
HOW MANY STANDARD DRINKS CONTAINING ALCOHOL DO YOU HAVE ON A TYPICAL DAY: PATIENT DOES NOT DRINK
AVERAGE NUMBER OF DAYS PER WEEK YOU HAVE A DRINK CONTAINING ALCOHOL: 0
HOW OFTEN DO YOU HAVE A DRINK CONTAINING ALCOHOL: NEVER
AUDIT-C TOTAL SCORE: 0
HAVE PEOPLE ANNOYED YOU BY CRITICIZING YOUR DRINKING: NO
SKIP TO QUESTIONS 9-10: 1
HAVE YOU EVER FELT YOU SHOULD CUT DOWN ON YOUR DRINKING: NO
TOTAL SCORE: 0
HOW MANY TIMES IN THE PAST YEAR HAVE YOU HAD 5 OR MORE DRINKS IN A DAY: 0
TOTAL SCORE: 0
EVER FELT BAD OR GUILTY ABOUT YOUR DRINKING: NO
TOTAL SCORE: 0
ON A TYPICAL DAY WHEN YOU DRINK ALCOHOL HOW MANY DRINKS DO YOU HAVE: 0
ALCOHOL_USE: NO

## 2023-01-01 ASSESSMENT — PAIN SCALES - PAIN ASSESSMENT IN ADVANCED DEMENTIA (PAINAD)
FACIALEXPRESSION: 0 - SMILING OR INEXPRESSIVE.
NEGVOCALIZATION: 0 - NONE.
CONSOLABILITY: 0 - NO NEED TO CONSOLE.
BODYLANGUAGE: 0 - RELAXED.
CONSOLABILITY: 0 - NO NEED TO CONSOLE.
BODYLANGUAGE: 0 - RELAXED.
TOTALSCORE: 0
NEGVOCALIZATION: 0 - NONE.
NEGVOCALIZATION: 0 - NONE.
FACIALEXPRESSION: 0 - SMILING OR INEXPRESSIVE.
CONSOLABILITY: 0 - NO NEED TO CONSOLE.
TOTALSCORE: 0
TOTALSCORE: 0
FACIALEXPRESSION: 0 - SMILING OR INEXPRESSIVE.
BODYLANGUAGE: 0 - RELAXED.

## 2023-01-01 ASSESSMENT — PAIN SCALES - GENERAL: PAIN_LEVEL: 0

## 2023-01-18 NOTE — TELEPHONE ENCOUNTER
Future Appointments         Provider Department Center    1/25/2023 3:00 PM (Arrive by 2:45 PM) Lili Ayala M.D. [facility] [place]          ESTABLISHED PATIENT PRE-VISIT PLANNING     Patient was NOT contacted to complete PVP.     Note: Patient will not be contacted if there is no indication to call.     1.  Reviewed notes from the last few office visits within the medical group: Yes, LOV 12/28/22    2.  If any orders were placed at last visit or intended to be done for this visit (i.e. 6 mos follow-up), do we have Results/Consult Notes?           Labs - Labs ordered, completed on 12/28/2022 and results are in chart.  Note: If patient appointment is for lab review and patient did not complete labs, check with provider if OK to reschedule patient until labs completed.         Imaging - Imaging was not ordered at last office visit.         Referrals - No referrals were ordered at last office visit.    3. Is this appointment scheduled as a Hospital Follow-Up? No    4.  Immunizations were updated in Epic using Reconcile Outside Information activity? Yes    5.  Patient is due for the following Health Maintenance Topics:   There are no preventive care reminders to display for this patient.    6.  AHA (Pulse8) form printed for Provider? N/A

## 2023-01-25 PROBLEM — Z79.899 CONTROLLED SUBSTANCE AGREEMENT SIGNED: Status: ACTIVE | Noted: 2023-01-01

## 2023-01-25 NOTE — PROGRESS NOTES
"Subjective:     CC: CS refill    HPI:   Anthony presents with her  today with    Fibromyalgia  Patient was previously on Norco 7.5 mg 3 times daily.  At the last visit Cymbalta was increased to 60 mg in the morning and 30 mg in the evening.   reports he has noticed an improvement since increasing the dose.   reports giving patient Norco when she's \"crying in pain\".   and patient are agreeable to try Tramadol.  UDS done 12/28/22 and CS agreement signed today.    Health Maintenance: Completed    ROS:  Constitutional:  Negative for chills, fever, fatigue, weight loss.  HEENT:  Negative for blurred vision, hearing loss, sore throat.    Respiratory:  Negative for cough, sputum production and shortness of breath.    Cardiovascular:  Negative for chest pain, palpitations and leg swelling.   Gastrointestinal:  Negative for abdominal pain, blood in stool, constipation, diarrhea and vomiting.   Musculoskeletal:  Positive for chronic pain but negative for falls, joint pain and neck pain.   Skin:  Negative for rash.   Neurological:  Negative for dizziness, seizures, weakness and headaches.   Endo/Heme/Allergies:  Does not bruise/bleed easily.   Psychiatric/Behavioral:  Negative for depression, anxiety and suicidal thoughts.      Objective:     Exam:  BP (!) 140/60   Pulse 69   Temp 36.8 °C (98.3 °F) (Temporal)   Resp 12   Ht 1.524 m (5')   Wt 51.7 kg (114 lb)   SpO2 97%   BMI 22.26 kg/m²  Body mass index is 22.26 kg/m².    Physical Exam    Gen: Alert and oriented, no acute distress.  Lungs: Normal effort, CTAB, no wheezing / rhonchi / rales.  CV: RRR, normal S1 and S2, no murmurs.      Assessment & Plan:     89 y.o. female with the following -     1. Fibromyalgia  2. Uncomplicated opioid dependence (HCC)  3. Controlled substance agreement signed  Chronic.  Patient was previously on Norco 7.5 mg 3 times daily (PDMP reviewed, last filled 11/28/22, #90).   and patient are agreeable to try " tramadol.  Prescribed tramadol 50 mg 3 times daily as needed.   was advised to monitor patient for withdrawal symptoms.  Patient was already given Narcan prescription at previous visit.  UDS done December 2022 and CS agreement signed today.  Side effects of opioids were discussed with the patient today.  - Controlled Substance Treatment Agreement  - traMADol (ULTRAM) 50 MG Tab; Take 1 Tablet by mouth every 6 hours as needed for Moderate Pain or Severe Pain for up to 30 days.  Dispense: 90 Tablet; Refill: 0        HCC Gap Form    Last edited 01/25/23 16:18 PST by Lili Ayala M.D.         I spent a total of 30 minutes with record review, exam, communication with the patient, communication with other providers, and documentation of this encounter.      Return in about 4 weeks (around 2/22/2023) for CS refill.    Please note that this dictation was created using voice recognition software. I have made every reasonable attempt to correct obvious errors, but I expect that there are errors of grammar and possibly content that I did not discover before finalizing the note.

## 2023-01-26 NOTE — ASSESSMENT & PLAN NOTE
"Patient was previously on Norco 7.5 mg 3 times daily.  At the last visit Cymbalta was increased to 60 mg in the morning and 30 mg in the evening.   reports he has noticed an improvement since increasing the dose.   reports giving patient Norco when she's \"crying in pain\".   and patient are agreeable to try Tramadol.  UDS done 12/28/22 and CS agreement signed today.  "

## 2023-05-18 NOTE — PROGRESS NOTES
"Subjective:     Chief Complaint   Patient presents with    Follow-Up       HPI:   Anthony presents today with    Fibromyalgia  Patient was previously on Norco 7.5 mg 3 times daily.  Prescribed Cymbalta 60 mg in the morning and 30 mg in the evening for better pain controlled and patient was switched form Norco to Tramadol 50 mg three times daily as needed.  Patient reports today she is in a lot of pain but most days the pain is well controlled.   reports he was giving 90 mg of Cymbalta.  UDS done 12/28/22 and CS agreement signed 1/25/23.    Primary hypertension  /50 today.  Currently on amlodipine 10 mg daily and lisinopril 20 mg twice daily.      Health Maintenance: Completed    ROS:  Negative except as stated above.    Objective:     Exam:  /50   Pulse 87   Temp 36.8 °C (98.2 °F) (Temporal)   Resp 20   Ht 1.499 m (4' 11\")   Wt 47.6 kg (105 lb)   SpO2 95%   BMI 21.21 kg/m²  Body mass index is 21.21 kg/m².    Physical Exam    Gen: Alert and oriented, no acute distress.  Lungs: Normal effort, CTAB, no wheezing / rhonchi / rales.  CV: RRR, normal S1 and S2, no murmurs.      Assessment & Plan:     89 y.o. female with the following -     1. Fibromyalgia  2. Uncomplicated opioid dependence (HCC)  Chronic, controlled.  Advised  to stop giving the patient 90 mg of Cymbalta.  Continue Cymbalta 60 mg in the morning and 30 mg in the evening.  Continue tramadol 50 mg 3 times daily as needed, refilled today for 90 days.  UDS done 12/20/2022 and CS agreement signed 1/25/2023.  - traMADol (ULTRAM) 50 MG Tab; Take 1 Tablet by mouth every 8 hours as needed for Moderate Pain or Severe Pain for up to 30 days.  Dispense: 90 Tablet; Refill: 0  - traMADol (ULTRAM) 50 MG Tab; Take 1 Tablet by mouth every 8 hours as needed for Moderate Pain or Severe Pain for up to 30 days.  Dispense: 90 Tablet; Refill: 0  - traMADol (ULTRAM) 50 MG Tab; Take 1 Tablet by mouth every 8 hours as needed for Moderate Pain or Severe " Pain for up to 30 days.  Dispense: 90 Tablet; Refill: 0    3. Primary hypertension  Chronic, controlled.  /50 today.  Continue lisinopril 20 mg twice daily and amlodipine 10 mg daily.  Consider decreasing the dose at the next visit if BP still low.          Return in about 3 months (around 8/18/2023) for CS refill.    Please note that this dictation was created using voice recognition software. I have made every reasonable attempt to correct obvious errors, but I expect that there are errors of grammar and possibly content that I did not discover before finalizing the note.

## 2023-05-19 NOTE — ASSESSMENT & PLAN NOTE
Patient was previously on Norco 7.5 mg 3 times daily.  Prescribed Cymbalta 60 mg in the morning and 30 mg in the evening for better pain controlled and patient was switched form Norco to Tramadol 50 mg three times daily as needed.  Patient reports today she is in a lot of pain but most days the pain is well controlled.   reports he was giving 90 mg of Cymbalta.  UDS done 12/28/22 and CS agreement signed 1/25/23.

## 2023-06-06 NOTE — ED NOTES
Pt and family member at bedside verbalize understanding of discharge instructions and follow up. Pt wheeled out to ED lobby with all belongings.

## 2023-06-06 NOTE — ED TRIAGE NOTES
Chief Complaint   Patient presents with    T-5000 Head Injury     Pt had an unwitnessed mechanical GLF at home, striking the back of her head. Pt bib daughter. Pt reports pain to her posterior head and neck.

## 2023-06-06 NOTE — ED PROVIDER NOTES
ED Provider Note    Primary care provider: Lili Ayala M.D.    CHIEF COMPLAINT  Chief Complaint   Patient presents with    T-5000 Head Injury     Pt had an unwitnessed mechanical GLF at home, striking the back of her head. Pt bib daughter. Pt reports pain to her posterior head and neck.        Additional history obtained from: EMS, daughter, see below  Limitation to History:  Select: Likely dementia    HPI  Anthony Lynn is a 89 y.o. female who presents to the Emergency Department after a fall.  The patient is amnestic to the events.  EMS was activated as the patient fell.  The daughter states she was in a different room, she heard 2 thuds, presumably as the patient fell, then attempted to get up and fell again.  She has had some cognitive decline over the past few years but not acutely.  The daughter states she was in her usual state of health earlier today.  She does have some difficulty with being unsteady.  EMS states they did ambulate her after the fall though she needed some assistance as she was unsteady, consistent with her baseline.    Patient denies any pain.  She does not recall the events that led up to her presentation here to the emergency department.      External Record Review: Patient with history of fibromyalgia.  Primary care note reviewed from 5/18, previously on Norco, transition to Cymbalta and tramadol.    REVIEW OF SYSTEMS  See HPI.     PAST MEDICAL HISTORY   has a past medical history of SUMA (acute kidney injury) ([facility]) (7/25/2018), Anxiety, Arthritis, CAD (coronary artery disease), CATARACT, Dental disorder, Dyslipidemia, Fibromyalgia, Generalized abdominal pain (10/14/2021), GERD (gastroesophageal reflux disease), Glaucoma, Headache(784.0), Hives, Hives, HTN, IBS (irritable bowel syndrome), Indigestion, Major depression in remission ([facility]) (5/31/2022), Myocardial infarct ([facility]), PAD (peripheral artery disease) ([facility]) (5/31/2022), Pain (7/31/15), Palpitations, Psychiatric problem,  "Renal insufficiency (4/12/2011), Snoring, Toe dislocation (5/17/2012), Unspecified disorder of thyroid, and Unspecified urinary incontinence.    SURGICAL HISTORY   has a past surgical history that includes other; cataract extraction with iol; and toe amputation (Left, 8/6/2015).    SOCIAL HISTORY  Social History     Tobacco Use    Smoking status: Never    Smokeless tobacco: Never   Vaping Use    Vaping Use: Never used   Substance Use Topics    Alcohol use: No    Drug use: No      Social History     Substance and Sexual Activity   Drug Use No       FAMILY HISTORY  Family History   Problem Relation Age of Onset    Heart Disease Father     Lung Disease Father        CURRENT MEDICATIONS  Reviewed.  See Encounter Summary.     ALLERGIES  Allergies   Allergen Reactions    Nkda [No Known Drug Allergy]        PHYSICAL EXAM  VITAL SIGNS: /57   Pulse 77   Temp 36.7 °C (98 °F) (Temporal)   Resp 16   Ht 1.499 m (4' 11\")   Wt 47.6 kg (105 lb)   SpO2 95%   BMI 21.21 kg/m²   Constitutional: Awake, alert in no apparent distress.  HENT: Normocephalic, 2 hematomas to the occipital region.  No raccoon eyes, no morales signs.  No midline neck tenderness.  Bilateral external ears normal. Nose normal.   Eyes: Conjunctiva normal, non-icteric, EOMI.    Thorax & Lungs: Easy unlabored respirations, Clear to ascultation bilaterally.  Cardiovascular: Regular rate, Regular rhythm, No murmurs, rubs or gallops. Bilateral pulses symmetrical.   Abdomen:  Soft, nontender, nondistended, normal active bowel sounds.   :    Skin: Visualized skin is  Dry, No erythema, No rash.   Musculoskeletal:   No cyanosis, clubbing or edema. No leg asymmetry.  Pelvis stable.  All long bones palpated and found to be trauma free.    Neurologic: Alert, Grossly non-focal.   Psychiatric: Normal affect, Normal mood  Lymphatic:          RADIOLOGY  I have independently interpreted the diagnostic imaging associated with this visit and am waiting the final reading " from the radiologist.   My preliminary interpretation is as follows: No acute process    Radiologist interpretation:   CT-HEAD W/O   Final Result      1.  Chronic microvascular ischemic type changes.   2.  No acute intracranial abnormality.   3.  Small left parietal scalp hematoma             COURSE & MEDICAL DECISION MAKING  Pertinent Labs & Imaging studies reviewed. (See chart for details)    COURSE & MEDICAL DECISION MAKING  Pertinent Labs & Imaging studies reviewed. (See chart for details)    Differential diagnoses include but are not limited to: Subdural, contusion,    1:19 PM - Nursing notes reviewed, patient seen and examined at bedside.    ED Observation Status? Yes; I am placing the patient in to an observation status due to a diagnostic uncertainty as well as therapeutic intensity. Patient placed in observation status at 1:25 PM    Observation plan is as follows: CT head, consider labs if any abnormalities    Upon Reevaluation, the patient's condition has: Remained stable.  The patient is having occipital head pain.  Will give home dose of Norco.    Patient discharged from ED Observation status at 6/6/2023 2:30 PM    Escalation of care considered, and ultimately not performed: blood analysis and acute inpatient care management, however at this time, the patient is most appropriate for outpatient management.    Barriers to care at this time, including but not limited to:  Likely dementia .     Decision tools and prescription drugs considered including, but not limited to: Nexus criteria negative    Decision Making:  This is a pleasant 89 y.o. year old female who presents with after a fall.  The family states that she is at baseline from a neurological standpoint.  She does have some chronic difficulty with balance and fairly often falls.  She is not anticoagulated.  She does have a small hematoma on the back of the scalp but no laceration.  CT negative for any acute process.  At this point as she is back to  baseline, family comfortable with taking her back home.    .    ADDITIONAL PROBLEM LIST  I did discuss medications with the daughter.  The patient is on tramadol as well as Cymbalta for fibromyalgia.  These medications can cause balance issues, would recommend they bring this up with her primary care physician as there is certainly some risk benefits for pain control versus fall risk     The patient was discharged home (see d/c instructions) was told to return immediately for any signs or symptoms listed, or any worsening at all.  The patient verbally agreed to the discharge precautions and follow-up plan which is documented in EPIC.    Discharge Medications:  Discharge Medication List as of 6/6/2023  2:10 PM          FINAL IMPRESSION  1. Fall, initial encounter    2. Hematoma of scalp, initial encounter    3. Minor head trauma

## 2023-06-13 NOTE — TELEPHONE ENCOUNTER
ED Follow-up  Call Attempts: 1  Date of ED visit: 06/06/23  Call Outcome: Reviewed ED visit with patient  Since you've been home, how have you been feeling?: Better  Were you prescribed any medications?: No  Do you have any questions about your discharge instructions?: No  RN Recommendations: No follow-up appointment needed at this time  Additional details: Member complaint of head is sore from fall. Recommended that she could use ice with a cloth between skin and ice, on for 20 min. and off for 20 min. for comfort.Member verbalized understanding.  Total time spent (mins): 4

## 2023-06-27 NOTE — TELEPHONE ENCOUNTER
Requested Prescriptions     Signed Prescriptions Disp Refills    lisinopril (PRINIVIL) 20 MG Tab 200 Tablet 0     Sig: Take 1 Tablet by mouth 2 times a day.     Authorizing Provider: XANDER JEAN A.P.R.N.

## 2023-08-14 NOTE — PROGRESS NOTES
NEW PATIENT VISIT PRE-VISIT PLANNING    1.  [employer] Patient is checked in Patient Demographics?Yes    2.  Immunizations were updated in Epic using Reconcile Outside Information activity? Yes         3.  Is this appointment scheduled as a Hospital Follow-Up? No    4.  Patient is due for the following Health Maintenance Topics:   Health Maintenance Due   Topic Date Due   • COVID-19 Vaccine (1) Never done   • IMM DTaP/Tdap/Td Vaccine (1 - Tdap) Never done   • IMM ZOSTER VACCINES (1 of 2) Never done   • IMM INFLUENZA (1) Never done         5.  Reviewed/Updated the following with patient:       •   Preferred Pharmacy? Yes       •   Preferred Lab? Yes       •   Preferred Communication? Yes       •   Allergies? Yes       •   Medications? YES. Was Abstract Encounter opened and chart updated? YES       •   Social History? No       •   Family History (document living status of immediate family members and if + hx of  cancer, diabetes, hypertension, hyperlipidemia, heart attack, stroke) No    6.  Updated Care Team?       •   DME Company (gait device, O2, CPAP, etc.) NO       •   Other Specialists (eye doctor, derm, GYN, cardiology, endo, etc): NO    7.  AHA (Puls8) form printed for Provider? Yes    
none

## 2023-08-14 NOTE — PROGRESS NOTES
INITIAL CARE MANAGEMENT CARE PLAN/ASSESSMENT       Anthony gave verbal permission to enroll in the Chronic Care Management (CCM) program. Anthony qualifies for the CCM program with a risk score of four (4) and the diagnoses of Hypertension (HTN), Chronic Kidney Disease (CKD), Atherosclerosis, memory loss and Major Depressive Disorder (MDD). Anthony and her spouse, Keith, live in their own home. They do have adult kids that live with them also. Anthony is a falls risk. Anthony reports she has never been advised to use an assistive device to ambulate. Anthony is in need of some home modifications, a shower grab bar, etc... will provide resources on those services. Anthony does not manage her own medications, her spouse does. Anthony occasionally cooks, but is totally dependant on having a  (spouse), managing finances and shopping. Anthony denied any current concerns with depression. Anthony's last few blood pressure readings in office have been 110/50 (5/18/2023), 133/64 (4/5/23), 140/60 (1/25/23) and 140/62 (12/28/22). Her last GFR lab was on 11/30/22 with results of 38. Her last Lipid Profile on 11/30/22 revealed slightly elevated LDL at 113. Per chart, Anthony was given referral to Neurology in 2022, but appointment was declined. Anthony was also referred to Cardiology in 2018, but it doesn't look like she ever established with Cardiologist. Anthony and her spouse, Keith would benefit with food bank resources, home repairs, senior socialization resources and information on [facility]' er service for transportation as needed.         Medication Self-Management Goals: Reviewed medications listed below with patient.    Current Outpatient Medications:     lisinopril (PRINIVIL) 20 MG Tab, Take 1 Tablet by mouth 2 times a day., Disp: 200 Tablet, Rfl: 0    traMADol (ULTRAM) 50 MG Tab, Take 1 Tablet by mouth every 8 hours as needed for Moderate Pain or Severe Pain for up to 30 days., Disp: 90 Tablet, Rfl: 0    DULoxetine (CYMBALTA) 30 MG Cap   Particles, Take 1 Capsule by mouth every evening., Disp: 90 Capsule, Rfl: 0    DULoxetine (CYMBALTA) 60 MG Cap DR Particles delayed-release capsule, Take 1 Capsule by mouth every morning., Disp: 90 Capsule, Rfl: 0    tizanidine (ZANAFLEX) 2 MG tablet, TAKE ONE TABLET BY MOUTH AT BEDTIME AS NEEDED, Disp: 100 Tablet, Rfl: 2    levothyroxine (SYNTHROID) 50 MCG Tab, Take 1 Tablet by mouth every morning on an empty stomach., Disp: 100 Tablet, Rfl: 3    amLODIPine (NORVASC) 10 MG Tab, TAKE ONE TABLET BY MOUTH EVERY DAY, Disp: 100 Tablet, Rfl: 3    Calcium Magnesium Zinc 333-133-5 MG Tab, Take 1 Tablet by mouth every day., Disp: , Rfl:     ferrous sulfate 325 (65 Fe) MG tablet, Take 325 mg by mouth every 7 days., Disp: , Rfl:     famotidine (PEPCID) 10 MG tablet, Take 10 mg by mouth every day., Disp: , Rfl:     docusate sodium (COLACE) 100 MG Cap, Take 100 mg by mouth every day., Disp: , Rfl:     dorzolamide (TRUSOPT) 2 % Solution, Administer 1 Drop into both eyes 2 times a day., Disp: , Rfl:     latanoprost (XALATAN) 0.005 % Solution, Administer 1 Drop into both eyes every day., Disp: , Rfl:     Naloxone (NARCAN) 4 MG/0.1ML Liquid, One spray in one nostril for overdose and call 911., Disp: 1 Each, Rfl: 0    Goal:  Tacy will continue to adhere to medication management regimen as prescribed.            Physical/Functional/Environmental Status:     Activities of Daily Living:  Bathing: independent   Dressing: independent  Grooming: independent  Mouth Care: independent  Toileting: independent  Climbing a Flight of Stairs: independent    Independent Activities of Daily Living:  Shopping: total dependence  Cooking: needs assistance  Managing Medications: total dependence  Using the phone and looking up numbers: total dependence  Driving or using public transportation: total dependence  Managing Finances: total dependence        8/11/2023     4:18 PM   STEADI Fall Risk   STEADI Risk for Falling Score 5   One or more falls in the  last year Yes   Advised to use a cane or walker to get around safely No   Feels unsteady when walking No   Steadies self on furniture while walking at home Yes   Worried about falling No   Needs to push with hands when rising from a chair Yes   Has trouble stepping up onto a curb / using stairs No   Often has to rush to the toilet No   Has lost some feeling in feet No   Takes medicine that makes him/her feel lightheaded or more tired than usual No   Takes medicine to sleep or improve mood Yes   Often feels sad or depressed No     Goal:  Tacy is a falls risk. Staff to provide fall risk reduction resources to prevent further falls.         Social Determinants of Health     Financial Status:      Financial Resource Strain: Medium Risk (8/11/2023)    Overall Financial Resource Strain (CARDIA)     Difficulty of Paying Living Expenses: Somewhat hard         Referred to CHW/SW:  Yes       Transportation Status:      Transportation Needs: No Transportation Needs (8/11/2023)    PRAPARE - Transportation     Lack of Transportation (Medical): No     Lack of Transportation (Non-Medical): No        Referred to CHW/SW:  NA      Food Insecurity:      Food Insecurity: Food Insecurity Present (8/11/2023)    Hunger Vital Sign     Worried About Running Out of Food in the Last Year: Sometimes true     Ran Out of Food in the Last Year: Sometimes true        Referred to CHW/SW:  Yes       Housing Status:     Housing Stability: Low Risk  (8/11/2023)    Housing Stability Vital Sign     Unable to Pay for Housing in the Last Year: No     Number of Places Lived in the Last Year: 1     Unstable Housing in the Last Year: No        Referred to CHW/SW:  NA      Social Connections:     Social Connections: Moderately Isolated (8/11/2023)    Social Connection and Isolation Panel [NHANES]     Frequency of Communication with Friends and Family: More than three times a week     Frequency of Social Gatherings with Friends and Family: More than three  times a week     Attends [employer] Services: Never     Active Member of Clubs or Organizations: No     Attends Club or Organization Meetings: Never     Marital Status:         Referred to CHW/SW:  Yes      Mental/Behavioral/Psychosocial Status:        1/25/2023     3:00 PM 4/5/2023     2:00 PM 8/11/2023     4:17 PM   Depression Screen (PHQ-2/PHQ-9)   PHQ-2 Total Score 0 0 0       Interpretation of PHQ-9 Total Score   Score Severity   1-4 No Depression   5-9 Mild Depression   10-14 Moderate Depression   15-19 Moderately Severe Depression   20-27 Severe Depression       Goal: Anthony will report and staff will monitor for increased need in mental health services.         Chronic Care Management Care Plan    Goal: Anthony will work towards managing her medical appointments independently.   Barriers: Memory loss  Interventions: Nurse to provide printed monthly list of medical appointments and reminders to promote independence in managing health.   Barriers: Memory loss    Start Date: 8/14/2023  End Date:        Goal: Anthony will be able to verbalize nephrotoxic substances to avoid.   Barriers: Memory loss  Interventions: Nurse to provide list of nephrotoxic substances.     Start Date: 8/14/2023  End Date:        Goal:  Anthony will work towards establishing a regular exercise routine.   Barriers: Memory, age  Interventions: Staff will provide resources on chair exercises.    Start Date: 8/14/2023  End Date:       Goal:  Anthony will be able to verbalize what medications she takes and their purpose.   Barriers: Memory  Interventions: Nurse to provide medication and usage list for Anthony to review.     Start Date: 8/14/2023  End Date:         Discussion:  Based on medical and social needs.     Goals: Created     Next Scheduled patient outreach: 9/11/2023

## 2023-10-02 NOTE — PROGRESS NOTES
"Spoke with Keith (spouse) this morning. Questioned whether Anthony is taking meds as prescribed. Keith administers Anthony's medications. Keith states that some days she will take her prescribed medication, but some days Anthony will not take them. Keith stated he gives her the meds, occasionally Anthony takes them right away, other days she \"walks around with the meds\", Keith then finds the meds laying around the house. Keith reports it is difficult to get Anthony to take them when he asks, \"she is stubborn and she forgets\", Anthony has an upcoming appt w/Jamie next week, marked appt with note that she is not taking meds as prescribed. Keith states he checks her blood pressure weekly and the systolic reading is usually 110 with diastolic readings of 65-70.  Her blood pressure readings in clinic have been: 110/50 (5/18/23), 133/64 (4/5/23), 140/60 (1/25/23) and 140/62 (12/28/22). Care Gaps include Shingrix and Flu vaccines.   "

## 2023-10-11 PROBLEM — M46.1 DEGENERATIVE JOINT DISEASE OF SACROILIAC JOINT (HCC): Chronic | Status: ACTIVE | Noted: 2022-05-31

## 2023-10-11 PROBLEM — Z89.422 ACQUIRED ABSENCE OF OTHER LEFT TOE(S) (HCC): Chronic | Status: ACTIVE | Noted: 2022-03-25

## 2023-10-11 PROBLEM — F32.5 MAJOR DEPRESSIVE DISORDER WITH SINGLE EPISODE, IN FULL REMISSION (HCC): Chronic | Status: ACTIVE | Noted: 2022-05-31

## 2023-10-11 PROBLEM — G31.9 CEREBRAL ATROPHY (HCC): Chronic | Status: ACTIVE | Noted: 2022-05-31

## 2023-10-11 PROBLEM — I70.0 ATHEROSCLEROSIS OF AORTA (HCC): Chronic | Status: ACTIVE | Noted: 2022-05-31

## 2023-10-11 PROBLEM — I73.9 PAD (PERIPHERAL ARTERY DISEASE) (HCC): Chronic | Status: ACTIVE | Noted: 2022-05-31

## 2023-10-11 NOTE — PROGRESS NOTES
"Subjective:     Chief Complaint   Patient presents with    GI Problem     Pain and vomiting, no appetite, no energy.   Lots of sleeping 36 hours    Fall     2 nights ago fell and hit head bumps on head       HPI:   Anthony presents today with    Fibromyalgia  Patient was previously on Norco 7.5 mg 3 times daily.  Currently on Tramadol 50 mg three times daily as needed (PDMP reviewed).  Medications are managed by .  Patient was also prescribed Cymbalta 60 mg in the morning and 30 mg in the evening but  reports he continued to give the patient 90 mg daily.  UDS done 12/28/22 and repeated today.  CS agreement signed 1/25/23.    Falls frequently   reports patient had 2 falls over the past 1 week.  First fall was when patient slipped while trying to sit down on chair and  witness the fall.  Patient complains of bump on the back of head.  Second fall was unwitnessed a couple days ago when patient got up in the middle of the night to use the restroom.  Patient can't recall how she fell but reports hitting her head.  Patient has walker at home but does not use it regularly.    Constipation  Difficult was history to obtain but patient and family report abdominal pain, nausea, and vomiting for the past 3 months.  Patient reports bowel movements are irregular.  Patient has tried alexander seltzer, omeprazole, and tums.   reports they have milk of magnesia at home.      Health Maintenance: Completed    ROS:  Negative except as stated above.      Objective:     Exam:  BP (!) 142/78   Pulse 85   Temp 36.4 °C (97.5 °F) (Tympanic)   Ht 1.499 m (4' 11\")   Wt 41.3 kg (91 lb)   SpO2 96%   BMI 18.38 kg/m²  Body mass index is 18.38 kg/m².    Physical Exam    Gen: Alert and oriented, no acute distress.  Lungs: Normal effort, CTAB, no wheezing / rhonchi / rales.  CV: RRR, normal S1 and S2, no murmurs.      Assessment & Plan:     89 y.o. female with the following -     1. Falls frequently  2. Traumatic " injury of head, initial encounter  Patient had 2 falls in the past week with head trauma and did not go to the ER.  STAT CT head ordered.  Discussed the important of ambulating with walker.  - CT-HEAD W/O; Future    3. Primary hypertension  Chronic, controlled.  /78 today.  Continue amlodipine 10 mg daily and lisinopril 20 mg twice daily.  Labs ordered.  - CBC WITHOUT DIFFERENTIAL; Future  - Comp Metabolic Panel; Future    4. Stage 3b chronic kidney disease (HCC)  Chronic, stable.  November 2022 GFR 38.  Labs ordered.  Continue to monitor and avoid nephrotoxic agents.  - Comp Metabolic Panel; Future    5. Vitamin D deficiency  Chronic, controlled.  November 2022 vitamin D WNL.  Labs ordered.  - VITAMIN D,25 HYDROXY (DEFICIENCY); Future    6. Other specified hypothyroidism  Chronic, controlled.  November 2022 TSH WNL.  Labs ordered.  Continue levothyroxine 50 mcg daily.  - TSH WITH REFLEX TO FT4; Future    7. Uncomplicated opioid dependence (HCC)  8. Fibromyalgia  Chronic, controlled.  PDMP reviewed.  UDS done today.  CS agreement signed January 2023.  Continue tramadol 50 mg three times daily as needed, refilled for 90 days.  Once again patient's  was advised to stop giving Cymbalta 90 mg daily.  Continue Cymbalta 60 mg in the morning and 30 mg in the evening.  - URINE DRUG SCREEN W/CONF (SEND OUT); Future  - traMADol (ULTRAM) 50 MG Tab; Take 1 Tablet by mouth every 8 hours as needed for Moderate Pain or Severe Pain for up to 30 days.  Dispense: 90 Tablet; Refill: 0  - traMADol (ULTRAM) 50 MG Tab; Take 1 Tablet by mouth every 8 hours as needed for Moderate Pain or Severe Pain for up to 30 days.  Dispense: 90 Tablet; Refill: 0  - traMADol (ULTRAM) 50 MG Tab; Take 1 Tablet by mouth every 8 hours as needed for Moderate Pain or Severe Pain for up to 30 days.  Dispense: 90 Tablet; Refill: 0    9. Constipation, unspecified constipation type  Chronic, uncontrolled.  Symptoms seem consistent with constipation  due to chronic opioid use.  Patient was advised to use milk of magnesia and colace daily.  If no improvement use miralax daily.    10. Need for vaccination  - INFLUENZA VACCINE, HIGH DOSE (65+ ONLY)          I spent a total of 35 minutes with record review, exam, communication with the patient, communication with other providers, and documentation of this encounter.      Return in about 6 months (around 4/11/2024) for Follow-up of chronic conditions.    Please note that this dictation was created using voice recognition software. I have made every reasonable attempt to correct obvious errors, but I expect that there are errors of grammar and possibly content that I did not discover before finalizing the note.

## 2023-10-12 PROBLEM — K59.00 CONSTIPATION: Status: ACTIVE | Noted: 2023-01-01

## 2023-10-12 PROBLEM — R29.6 FALLS FREQUENTLY: Status: ACTIVE | Noted: 2023-01-01

## 2023-10-12 PROBLEM — R54 FRAILTY: Status: ACTIVE | Noted: 2023-01-01

## 2023-10-12 NOTE — ASSESSMENT & PLAN NOTE
Patient was previously on Norco 7.5 mg 3 times daily.  Currently on Tramadol 50 mg three times daily as needed (PDMP reviewed).  Medications are managed by .  Patient was also prescribed Cymbalta 60 mg in the morning and 30 mg in the evening but  reports he continued to give the patient 90 mg daily.  UDS done 12/28/22 and repeated today.  CS agreement signed 1/25/23.

## 2023-10-12 NOTE — ASSESSMENT & PLAN NOTE
Difficult was history to obtain but patient and family report abdominal pain, nausea, and vomiting for the past 3 months.  Patient reports bowel movements are irregular.  Patient has tried alexander seltzer, omeprazole, and tums.   reports they have milk of magnesia at home.

## 2023-10-12 NOTE — ASSESSMENT & PLAN NOTE
reports patient had 2 falls over the past 1 week.  First fall was when patient slipped while trying to sit down on chair and  witness the fall.  Patient complains of bump on the back of head.  Second fall was unwitnessed a couple days ago when patient got up in the middle of the night to use the restroom.  Patient can't recall how she fell but reports hitting her head.  Patient has walker at home but does not use it regularly.

## 2023-10-14 PROBLEM — K92.2 GIB (GASTROINTESTINAL BLEEDING): Status: ACTIVE | Noted: 2023-01-01

## 2023-10-14 PROBLEM — E43 SEVERE PROTEIN-CALORIE MALNUTRITION (HCC): Status: ACTIVE | Noted: 2023-01-01

## 2023-10-14 PROBLEM — R55 SYNCOPE: Status: ACTIVE | Noted: 2023-01-01

## 2023-10-14 NOTE — ED NOTES
Pt is sleeping in bed. Gurney in low position.  Call light within reach. Family at bedside.    Cardiac monitor: Yes  Oxygen: 2lpm via NC connected to wall  Fall risk: Bed alarm in use. High risk  Pulse ox: Yes

## 2023-10-14 NOTE — DISCHARGE PLANNING
"TCN following. HTH/SCP chart review completed. Noted pt currently on strict bedrest, awaiting GI consult and is A&O x 0. Also noted per ED provider note from 10/14 @4:35AM, \"Daughter thinks patient and her  may have had discussions about end-of-life care and potentially no intubation but is not sure\". Given aforementioned, TCN will not initiate dc planning discussion as essentially awaiting POC/GOC, etc and pt is unable to effectively communicate at this time as well. TCN will monitor and initiate TCN initial visit once more medically appropriate/POC established, etc.     "

## 2023-10-14 NOTE — PROGRESS NOTES
MD notified of Hgb of 7.9 s/p 1 unit RBC. Per MD continue to monitor, no transfusion at this time.

## 2023-10-14 NOTE — CONSULTS
Cardiology Initial Consult Note    Date of note:    10/14/2023      Consulting Physician: Tyra Byrnes M.D.    Name:   Anthony Lynn   YOB: 1931  Age:   89 y.o.  female   MRN:   [id number]    Reason for Consultation: Found down, possible syncope/near syncope confusion    HPI:  Anthony Lynn is a 89 y.o. female with a history of hypertension, hypothyroidism, glaucoma, brought in as a possible ST elevation MI.  Initial ECG shows sinus versus ectopic atrial rhythm, premature atrial complex, left bundle branch block, not significantly changed from prior ECG of 7/25/2018.  STEMI activation was canceled.    The patient is not able to give a lot of history.  However she currently denies any chest discomfort or pain.  Daughter is at bedside and gives some history.  The patient still lives with her  and family.  Per daughter, she has had several falls fainting spells in the past couple weeks.  Early this morning, she got up to go to the bathroom and is not clear, but patient was found in the bathroom and per daughter eyes rolled back and she was unresponsive for about a minute.  When she did come to, she was confused and not herself.  EMS were called and when she was brought in.    Problem list:  Active Problems:    Syncope (POA: Unknown)  Resolved Problems:    * No resolved hospital problems. *      Past Medical History:   Diagnosis Date    SUMA (acute kidney injury) (Formerly [facility]) 7/25/2018    Anxiety     Arthritis     CAD (coronary artery disease)     CATARACT      B/L    Dental disorder     upper and lower dentures    Dyslipidemia     Fibromyalgia     Generalized abdominal pain 10/14/2021    GERD (gastroesophageal reflux disease)     Glaucoma     Headache(784.0)     Hives     Hives     HTN     IBS (irritable bowel syndrome)     Indigestion     Major depression in remission (Formerly [facility]) 5/31/2022    Myocardial infarct ([facility]ly [facility])     2005    PAD (peripheral artery disease) ([facility]ly [facility]) 5/31/2022    Pain 7/31/15     all over 3/10 (hx of fibromyalgia    Palpitations     Psychiatric problem     depression    Renal insufficiency 4/12/2011    Snoring     Toe dislocation 5/17/2012    Unspecified disorder of thyroid     hypothroidism, no longer on meds    Unspecified urinary incontinence        Past Surgical History:   Procedure Laterality Date    TOE AMPUTATION Left 8/6/2015    Procedure: TOE AMPUTATION 2ND;  Surgeon: Fredi Nelson D.P.M.;  Location: SURGERY [facility];  Service:     CATARACT EXTRACTION WITH IOL      OTHER      cataracts removed     (Not in a hospital admission)    Current Facility-Administered Medications   Medication Dose Route Frequency Provider Last Rate Last Admin    [COMPLETED] NS (Bolus) infusion 500 mL  500 mL Intravenous Once Tyra Byrnes M.D. 999 mL/hr at 10/14/23 0500 500 mL at 10/14/23 0500     Current Outpatient Medications   Medication Sig Dispense Refill    traMADol (ULTRAM) 50 MG Tab Take 1 Tablet by mouth every 8 hours as needed for Moderate Pain or Severe Pain for up to 30 days. 90 Tablet 0    [START ON 11/11/2023] traMADol (ULTRAM) 50 MG Tab Take 1 Tablet by mouth every 8 hours as needed for Moderate Pain or Severe Pain for up to 30 days. 90 Tablet 0    [START ON 12/11/2023] traMADol (ULTRAM) 50 MG Tab Take 1 Tablet by mouth every 8 hours as needed for Moderate Pain or Severe Pain for up to 30 days. 90 Tablet 0    lisinopril (PRINIVIL) 20 MG Tab Take 1 Tablet by mouth 2 times a day. 200 Tablet 0    DULoxetine (CYMBALTA) 30 MG Cap DR Particles Take 1 Capsule by mouth every evening. 90 Capsule 0    DULoxetine (CYMBALTA) 60 MG Cap DR Particles delayed-release capsule Take 1 Capsule by mouth every morning. 90 Capsule 0    tizanidine (ZANAFLEX) 2 MG tablet TAKE ONE TABLET BY MOUTH AT BEDTIME AS NEEDED 100 Tablet 2    levothyroxine (SYNTHROID) 50 MCG Tab Take 1 Tablet by mouth every morning on an empty stomach. 100 Tablet 3    amLODIPine (NORVASC) 10 MG Tab TAKE ONE TABLET BY MOUTH  EVERY  Tablet 3    Calcium Magnesium Zinc 333-133-5 MG Tab Take 1 Tablet by mouth every day.      docusate sodium (COLACE) 100 MG Cap Take 100 mg by mouth every day.      dorzolamide (TRUSOPT) 2 % Solution Administer 1 Drop into both eyes 2 times a day.      latanoprost (XALATAN) 0.005 % Solution Administer 1 Drop into both eyes every day.      Naloxone (NARCAN) 4 MG/0.1ML Liquid One spray in one nostril for overdose and call 911. 1 Each 0     Allergies   Allergen Reactions    Nkda [No Known Drug Allergy]      Family History   Problem Relation Age of Onset    Heart Disease Father     Lung Disease Father      Social History     Socioeconomic History    Marital status:      Spouse name: Not on file    Number of children: Not on file    Years of education: Not on file    Highest education level: Not on file   Occupational History    Not on file   Tobacco Use    Smoking status: Never    Smokeless tobacco: Never   Vaping Use    Vaping Use: Never used   Substance and Sexual Activity    Alcohol use: No    Drug use: No    Sexual activity: Not Currently     Partners: Male   Other Topics Concern     Service No    Blood Transfusions No    Caffeine Concern No    Occupational Exposure No    Hobby Hazards No    Sleep Concern No    Stress Concern No    Weight Concern No    Special Diet No    Back Care No    Exercise No    Bike Helmet No    Seat Belt Yes    Self-Exams No   Social History Narrative    Not on file     Social Determinants of Health     Financial Resource Strain: Medium Risk (8/11/2023)    Overall Financial Resource Strain (CARDIA)     Difficulty of Paying Living Expenses: Somewhat hard   Food Insecurity: Food Insecurity Present (8/11/2023)    Hunger Vital Sign     Worried About Running Out of Food in the Last Year: Sometimes true     Ran Out of Food in the Last Year: Sometimes true   Transportation Needs: No Transportation Needs (8/11/2023)    PRAPARE - Transportation     Lack of Transportation  "(Medical): No     Lack of Transportation (Non-Medical): No   Physical Activity: Inactive (8/11/2023)    Exercise Vital Sign     Days of Exercise per Week: 0 days     Minutes of Exercise per Session: 0 min   Stress: No Stress Concern Present (8/11/2023)    [place] of Occupational [employer] - Occupational Stress Questionnaire     Feeling of Stress : Only a little   Social Connections: Moderately Isolated (8/11/2023)    Social Connection and Isolation Panel [NHANES]     Frequency of Communication with Friends and Family: More than three times a week     Frequency of Social Gatherings with Friends and Family: More than three times a week     Attends Hinduism Services: Never     Active Member of Clubs or Organizations: No     Attends Club or Organization Meetings: Never     Marital Status:    Intimate Partner Violence: Unknown (8/11/2023)    Humiliation, Afraid, Rape, and Kick questionnaire     Fear of Current or Ex-Partner: Patient refused     Emotionally Abused: Patient refused     Physically Abused: Patient refused     Sexually Abused: Patient refused   Housing Stability: Low Risk  (8/11/2023)    Housing Stability Vital Sign     Unable to Pay for Housing in the Last Year: No     Number of Places Lived in the Last Year: 1     Unstable Housing in the Last Year: No     No intake or output data in the 24 hours ending 10/14/23 0457        Review of Systems   Reason unable to perform ROS: Altered mental status.        Physical Exam  Body mass index is 18.38 kg/m².  /67   Pulse 76   Resp 17   Ht 1.499 m (4' 11\")   Wt 41.3 kg (91 lb)   SpO2 95%   Vitals:    10/14/23 0432 10/14/23 0440   BP: 114/67    Pulse: 76    Resp: 17    SpO2: 95%    Weight:  41.3 kg (91 lb)   Height:  1.499 m (4' 11\")     Oxygen Therapy:  Pulse Oximetry: 95 %    Physical Exam  Constitutional:       Appearance: She is underweight.   Eyes:      Extraocular Movements: Extraocular movements intact.      Conjunctiva/sclera: " Conjunctivae normal.   Neck:      Vascular: Carotid bruit present. No JVD.   Cardiovascular:      Rate and Rhythm: Normal rate and regular rhythm. Extrasystoles are present.     Heart sounds: Heart sounds are distant. Murmur heard.      Early systolic murmur is present with a grade of 2/6 radiating to the neck.      No friction rub. No gallop.   Pulmonary:      Effort: Pulmonary effort is normal. No respiratory distress.      Breath sounds: Normal breath sounds. No wheezing.   Abdominal:      General: Bowel sounds are normal.      Palpations: Abdomen is soft.   Musculoskeletal:      Cervical back: Neck supple.      Right lower leg: No edema.      Left lower leg: No edema.   Skin:     General: Skin is warm and dry.   Neurological:      Mental Status: She is alert and oriented to person, place, and time. Mental status is at baseline.   Psychiatric:         Mood and Affect: Mood normal.        Labs (personally reviewed and notable for):   Lab Results   Component Value Date/Time    SODIUM 140 11/30/2022 11:31 AM    POTASSIUM 4.3 11/30/2022 11:31 AM    CHLORIDE 105 11/30/2022 11:31 AM    CO2 22 11/30/2022 11:31 AM    GLUCOSE 93 11/30/2022 11:31 AM    BUN 32 (H) 11/30/2022 11:31 AM    CREATININE 1.33 11/30/2022 11:31 AM    CREATININE 1.21 (H) 12/10/2010 02:40 PM    BUNCREATRAT 17.9 01/19/2022 10:51 PM    BUNCREATRAT 18 11/04/2010 01:16 PM    GLOMRATE 43 (L) 12/10/2010 02:40 PM      Lab Results   Component Value Date/Time    WBC 5.9 11/30/2022 11:31 AM    WBC 5.8 11/04/2010 01:16 PM    RBC 3.66 (L) 11/30/2022 11:31 AM    RBC 4.04 11/04/2010 01:16 PM    HEMOGLOBIN 11.5 (L) 11/30/2022 11:31 AM    HEMATOCRIT 36.0 (L) 11/30/2022 11:31 AM    MCV 98.4 (H) 11/30/2022 11:31 AM    MCV 96 11/04/2010 01:16 PM    MCH 31.4 11/30/2022 11:31 AM    MCH 31.2 11/04/2010 01:16 PM    MCHC 31.9 (L) 11/30/2022 11:31 AM    MPV 10.4 11/30/2022 11:31 AM    NEUTSPOLYS 42.40 (L) 11/30/2022 11:31 AM    LYMPHOCYTES 44.80 (H) 11/30/2022 11:31 AM     "MONOCYTES 9.50 2022 11:31 AM    EOSINOPHILS 2.40 2022 11:31 AM    BASOPHILS 0.70 2022 11:31 AM    INR 1.12 2018 10:48 PM      Lab Results   Component Value Date/Time    CHOLSTRLTOT 195 2022 11:31 AM     (H) 2022 11:31 AM    HDL 63 2022 11:31 AM    TRIGLYCERIDE 97 2022 11:31 AM       No results found for: \"TROPONINT\"  No results found for: \"NTPROBNP\"  Lab Results   Component Value Date/Time    HBA1C 5.8 (H) 2018 0248     Current labs are still pending    Cardiac Imaging and Procedures Review:    EKG dated 10/14/23: My personal interpretation is ectopic atrial rhythm, 77 bpm, left bundle branch block, no significant change from prior ECG of 2018    Echo dated 11/15/2013: Review of the report is normal left ventricular cavity size, mild concentric hypertrophy, and normal systolic function.  Preserved right ventricular systolic function.  Mild left atrial enlargement.  Mitral annular calcification with mild mitral regurgitation.  Aortic valve sclerosis without significant stenosis or regurgitation.  Mild tricuspid regurgitation.    Nuclear Perfusion Imaging (2018): No evidence of ischemia or infarction with normal LVEF.    Carotid ultrasound dated 11/15/2013: Mild bilateral internal carotid artery stenosis    Radiology test Review:  DX-CHEST-PORTABLE (1 VIEW)   Final Result      No acute process.      DX-PELVIS-1 OR 2 VIEWS   Final Result      Normal exam.      CT-HEAD W/O    (Results Pending)       Impression and Medical Decision Makin.  Found down, possible syncope/near syncope unclear etiology.  Presentation is not consistent with acute coronary syndrome.  ECG has old left bundle branch block, no indication to proceed with Cath Lab.  STEMI was canceled.  Patient does have a heart murmur, although echocardiogram in  had mild aortic valve sclerosis, would recheck an echo to check on progression of aortic valve disease.  Monitor on " telemetry, and can rule out for myocardial infarction.  - Trend troponin  - Hold off heparin unless troponin is abnormal  - Check an echocardiogram for aortic stenosis and other structural disease  - Monitor on telemetry overnight    2.  Hypertension, controlled.  Continue home dose of lisinopril and amlodipine.    Thank you for allowing me to participate in the care of this patient, cardiology will continue to follow.        Roxane Shah M.D.  Cardiologist, [facility] [place]     This note was generated using voice recognition software which has a small chance of producing errors of grammar and possibly content. I have made every reasonable attempt to find and correct any obvious errors, but expect that some may not be found prior to finalization of this note.

## 2023-10-14 NOTE — PROGRESS NOTES
4 Eyes Skin Assessment Completed by JAYNA Bruner and BRIDGET Hopkins    Head WDL  Ears WDL  Nose WDL  Mouth WDL  Neck WDL  Breast/Chest WDL  Shoulder Blades WDL  Spine WDL  (R) Arm/Elbow/Hand WDL  (L) Arm/Elbow/Hand WDL  Abdomen WDL  Groin WDL  Scrotum/Coccyx/Buttocks Red but blanching  (R) Leg WDL  (L) Leg WDL  (R) Heel/Foot/Toe Redness and Boggy  (L) Heel/Foot/Toe Redness and Boggy          Devices In Places Tele Box      Interventions In Place Gray Ear Foams    Possible Skin Injury No    Pictures Uploaded Into Epic N/A  Wound Consult Placed N/A  RN Wound Prevention Protocol Ordered No

## 2023-10-14 NOTE — ED NOTES
"Med Rec complete per daughter at bedside. Daughter unsure of last doses, states her father \"takes care of that.\"  Allergies reviewed.  Home Pharm[facility]:  [facility]  "

## 2023-10-14 NOTE — CONSULTS
GASTROENTEROLOGY CONSULTATION    PATIENT NAME: Anthony Lynn  : 1931  CSN: [id number]  MRN:  [id number]     CONSULTATION DATE:  10/14/2023    PRIMARY CARE PROVIDER:  Lili Ayala M.D.      REASON FOR CONSULT:  Hematemesis    HISTORY OF PRESENT ILLNESS:  Anthony Lynn is a 89 y.o. female with past medical history of dementia,  hypertension, GERD, CKD stage III, CAD who presented 10/14/2023 after a syncopal episode.n EKG showed LBBB not significantly changed from prior EKG in 2018.  Cardiology consulted and ruled out STEMI. Heparin was started before cardiology evaluated but it has been stopped.  When I went to see patient she had been sedated for CT scan and she did not participate in history. Her daughter was bedside and that is where interview came from. She states that they have been wanting her to see GI for 6 months. She has been complaining of pain in her abdomen and eating less and less over time. She only drinks ensure now. She has lost a lot of weight  She was taking Excedrin and Motrin for pain. She takes a lot. denies taking other NSAID.  No prior history of GI bleed or endoscopy. She also has been complaining of dizziness and has had frequent falls.  Intermittently she has been having vomiting with coffee-ground emesis and having black stools.  She did have vomiting with coffee-ground in ER as well.  Her hemoglobin 8.2 down from 11.5 on 2022.  CTA of the abdomen negative for active bleeding. There is gastric distention.     PAST MEDICAL HISTORY:  Past Medical History:   Diagnosis Date    SUMA (acute kidney injury) ([facility]) 2018    Anxiety     Arthritis     CAD (coronary artery disease)     CATARACT      B/L    Dental disorder     upper and lower dentures    Dyslipidemia     Fibromyalgia     Generalized abdominal pain 10/14/2021    GERD (gastroesophageal reflux disease)     Glaucoma     Headache(784.0)     Hives     Hives     HTN     IBS (irritable bowel syndrome)      Indigestion     Major depression in remission ([facility]) 5/31/2022    Myocardial infarct ([facility])     2005    PAD (peripheral artery disease) ([facility]) 5/31/2022    Pain 7/31/15    all over 3/10 (hx of fibromyalgia    Palpitations     Psychiatric problem     depression    Renal insufficiency 4/12/2011    Snoring     Toe dislocation 5/17/2012    Unspecified disorder of thyroid     hypothroidism, no longer on meds    Unspecified urinary incontinence        PAST SURGICAL HISTORY:  Past Surgical History:   Procedure Laterality Date    TOE AMPUTATION Left 8/6/2015    Procedure: TOE AMPUTATION 2ND;  Surgeon: Fredi Nelson D.P.M.;  Location: SURGERY [facility];  Service:     CATARACT EXTRACTION WITH IOL      OTHER      cataracts removed        CURRENT MEDS:  Current Facility-Administered Medications   Medication Dose Route Frequency Provider Last Rate Last Admin    senna-docusate (Pericolace Or Senokot S) 8.6-50 MG per tablet 2 Tablet  2 Tablet Oral BID Ryan Holland M.D.        And    polyethylene glycol/lytes (Miralax) PACKET 1 Packet  1 Packet Oral QDAY PRN Ryan Holland M.D.        And    magnesium hydroxide (Milk Of Magnesia) suspension 30 mL  30 mL Oral QDAY PRN Ryan Holland M.D.        And    bisacodyl (Dulcolax) suppository 10 mg  10 mg Rectal QDAY PRN Ryan Holland M.D.        0.9 % NaCl with KCl 20 mEq infusion   Intravenous Continuous Ryan Holland M.D. 83 mL/hr at 10/14/23 0745 New Bag at 10/14/23 0745    acetaminophen (Tylenol) tablet 650 mg  650 mg Oral Q6HRS PRN Ryan Holland M.D.        Pharmacy Consult Request ...Pain Management Review 1 Each  1 Each Other PHARMACY TO DOSE Ryan Holland M.D.        oxyCODONE immediate-release (Roxicodone) tablet 2.5 mg  2.5 mg Oral Q3HRS PRN Ryan Holland M.D.        Or    oxyCODONE immediate-release (Roxicodone) tablet 5 mg  5 mg Oral Q3HRS PRN Ryan Holland M.D.        Or    morphine 4 MG/ML injection 2 mg  2 mg Intravenous Q3HRS PRN  Ryan Holland M.D.        labetalol (Normodyne/Trandate) injection 10 mg  10 mg Intravenous Q4HRS PRN Ryan Holland M.D.        pantoprazole (Protonix) injection 40 mg  40 mg Intravenous BID Ryan Holland M.D.   40 mg at 10/14/23 0747    DULoxetine (Cymbalta) capsule 30 mg  30 mg Oral Q EVENING Ryan Holland M.D.        DULoxetine (Cymbalta) capsule 60 mg  60 mg Oral QAM Ryan Holland M.D.        levothyroxine (Synthroid) tablet 50 mcg  50 mcg Oral AM ES Ryan Holland M.D.        lisinopril (Prinivil) tablet 10 mg  10 mg Oral BID Ryan Holland M.D.        tizanidine (Zanaflex) tablet 2 mg  2 mg Oral QHS PRN Ryan Holland M.D.            ALLERGIES:  Allergies   Allergen Reactions    Nkda [No Known Drug Allergy]        SOCIAL HISTORY:  Social History     Socioeconomic History    Marital status:      Spouse name: Not on file    Number of children: Not on file    Years of education: Not on file    Highest education level: Not on file   Occupational History    Not on file   Tobacco Use    Smoking status: Never    Smokeless tobacco: Never   Vaping Use    Vaping Use: Never used   Substance and Sexual Activity    Alcohol use: No    Drug use: No    Sexual activity: Not Currently     Partners: Male   Other Topics Concern     Service No    Blood Transfusions No    Caffeine Concern No    Occupational Exposure No    Hobby Hazards No    Sleep Concern No    Stress Concern No    Weight Concern No    Special Diet No    Back Care No    Exercise No    Bike Helmet No    Seat Belt Yes    Self-Exams No   Social History Narrative    Not on file     Social Determinants of Health     Financial Resource Strain: Medium Risk (8/11/2023)    Overall Financial Resource Strain (CARDIA)     Difficulty of Paying Living Expenses: Somewhat hard   Food Insecurity: Food Insecurity Present (8/11/2023)    Hunger Vital Sign     Worried About Running Out of Food in the Last Year: Sometimes true     Ran Out of  Food in the Last Year: Sometimes true   Transportation Needs: No Transportation Needs (8/11/2023)    PRAPARE - Transportation     Lack of Transportation (Medical): No     Lack of Transportation (Non-Medical): No   Physical Activity: Inactive (8/11/2023)    Exercise Vital Sign     Days of Exercise per Week: 0 days     Minutes of Exercise per Session: 0 min   Stress: No Stress Concern Present (8/11/2023)    Citizen of [place] of Occupational Health - Occupational Stress Questionnaire     Feeling of Stress : Only a little   Social Connections: Moderately Isolated (8/11/2023)    Social Connection and Isolation Panel [NHANES]     Frequency of Communication with Friends and Family: More than three times a week     Frequency of Social Gatherings with Friends and Family: More than three times a week     Attends Anabaptism Services: Never     Active Member of Clubs or Organizations: No     Attends Club or Organization Meetings: Never     Marital Status:    Intimate Partner Violence: Unknown (8/11/2023)    Humiliation, Afraid, Rape, and Kick questionnaire     Fear of Current or Ex-Partner: Patient refused     Emotionally Abused: Patient refused     Physically Abused: Patient refused     Sexually Abused: Patient refused   Housing Stability: Low Risk  (8/11/2023)    Housing Stability Vital Sign     Unable to Pay for Housing in the Last Year: No     Number of Places Lived in the Last Year: 1     Unstable Housing in the Last Year: No       FAMILY HISTORY:  Family History   Problem Relation Age of Onset    Heart Disease Father     Lung Disease Father         REVIEW OF SYSTEMS:  General ROS: Negative for - chills, fever, night sweats or weight loss.  HEENT ROS: Negative  Respiratory ROS: Negative for - cough or shortness of breath.  Cardiovascular ROS:  Negative for - chest pain or palpitations.  Gastrointestinal ROS: As per the history of present illness.  Genito-Urinary ROS: Negative  Musculoskeletal ROS:  "Negative.  Neurological ROS: Negative  Skin ROS: negative  Hematology ROS: negative  Endocrinology ROS: Negative        PHYSICAL EXAM:  VITALS: BP (!) 147/68   Pulse 76   Temp 37 °C (98.6 °F) (Temporal)   Resp 18   Ht 1.499 m (4' 11\")   Wt 41.3 kg (91 lb)   SpO2 100%   BMI 18.38 kg/m²   GEN:  Anthony Lynn is a 89 y.o. female who is sleeping and not responsive - pt was given ativan  HEENT: Mucous membranes pink and moist.  Sclera anicteric.    NECK:    Neck supple without lymphadenopathy or thyromegaly.  LUNGS: Clear to auscultation posteriorly.  HEART: Regular rate and rhythm. S1 and S2 normal. No murmurs, gallops  ABD:  + BS nt/n d   RECTAL: Not done at this time.  EXT:  Without cyanosis, deformity or pitting edema.  SKIN:  Pink, warm, dry.  NEURO: Grossly intact, A/OR.    LABS:  Recent Labs     10/14/23  0429   WBC 11.3*   MCV 93.6     Recent Labs     10/14/23  0429   GLUCOSE 147*   BUN 46*   CO2 31     Lab Results   Component Value Date    INR 1.04 10/14/2023    INR 1.12 07/24/2018    INR 0.99 11/03/2014     No components found for: \"ALT\", \"AST\", \"GGT\", \"ALKPHOS\"  No results found for: \"BILINEO\"      @LASTIMGCAT([id number])@     @LASTIMGCAT([id number])@       IMPRESSION/PLAN:  Hematemesis  Melena  Anemia secondary to GI bleed  Epigastric pain  Weight loss  Early satiety and anorexia  Abnormal ct scan of the stomach with gastric distension  Renal insufficiency  CAD    EGD tomorrow am  NPO  Need to rule out ulcer versus infiltrative process of stomach  PPI  We will do tomorrow as patient in non-responsive at this time and she is getting transfusions. Trending troponins. MI r/o       Christie Cuevas M.D.  Gastroenterology      "

## 2023-10-14 NOTE — ED PROVIDER NOTES
ED Provider Note    CHIEF COMPLAINT  Chief Complaint   Patient presents with    Chest Pain     STEMI Pre Alert       EXTERNAL RECORDS REVIEWED  Outpatient Notes patient had labs ordered by PCP 10/11/2023, have not been done yet  Patient is on multiple medications including tramadol and Cymbalta, was receiving more Cymbalta than prescribed at the most recent outpatient visit.  Frequent falls reported then.  Supposed to use walker.  Chronic abdominal pain at that time and history of constipation    HPI/ROS  LIMITATION TO HISTORY   Select: Altered mental status / Confusion  OUTSIDE HISTORIAN(S):  Family daughter and EMS      Anthony Lynn is a 89 y.o. female who presents via EMS as a STEMI alert.  EMS was called due to syncopal event at home and patient altered.  When they arrived patient complained of nonspecific pain.  EKG was done which was concerning for STEMI.  Daughter reports patient has a history of hypertension, no prior cardiac disease.  Daughter reports multiple syncopal episodes recently, multiple falls.  Not on any blood thinners.  No fevers or recent illnesses.  Daughter thinks patient and her  may have had discussions about end-of-life care and potentially no intubation but is not sure.  She states she will discuss further with the  as patient is unable answer these questions at this time when asked.      PAST MEDICAL HISTORY  Past Medical History:   Diagnosis Date    Major depression in remission (Formerly [facility]) 5/31/2022    PAD (peripheral artery disease) (Formerly [facility]) 5/31/2022    Generalized abdominal pain 10/14/2021    USMA (acute kidney injury) (Formerly [facility]) 7/25/2018    Pain 7/31/15    all over 3/10 (hx of fibromyalgia    Toe dislocation 5/17/2012    Renal insufficiency 4/12/2011    Anxiety     Arthritis     CAD (coronary artery disease)     CATARACT      B/L    Dental disorder     upper and lower dentures    Dyslipidemia     Fibromyalgia     GERD (gastroesophageal reflux disease)     Glaucoma      Headache(784.0)     Hives     Hives     HTN     IBS (irritable bowel syndrome)     Indigestion     Myocardial infarct (HCC)     2005    Palpitations     Psychiatric problem     depression    Snoring     Unspecified disorder of thyroid     hypothroidism, no longer on meds    Unspecified urinary incontinence         SURGICAL HISTORY  Past Surgical History:   Procedure Laterality Date    TOE AMPUTATION Left 8/6/2015    Procedure: TOE AMPUTATION 2ND;  Surgeon: Fredi Nelson D.P.M.;  Location: SURGERY [facility];  Service:     CATARACT EXTRACTION WITH IOL      OTHER      cataracts removed        FAMILY HISTORY  Family History   Problem Relation Age of Onset    Heart Disease Father     Lung Disease Father        SOCIAL HISTORY       CURRENT MEDICATIONS  Home Medications    Medication Sig Taking? Last Dose Authorizing Provider   OMEPRAZOLE PO Take 1 Dose by mouth every day. Yes UNK at [facility]   traMADol (ULTRAM) 50 MG Tab Take 1 Tablet by mouth every 8 hours as needed for Moderate Pain or Severe Pain for up to 30 days.  UNK at [facility] Lili Ayala M.D.   lisinopril (PRINIVIL) 20 MG Tab Take 1 Tablet by mouth 2 times a day.  UNK at [facility] WAYLON CainP.RELEAZAR   DULoxetine (CYMBALTA) 30 MG Cap DR Particles Take 1 Capsule by mouth every evening.  UN at [facility] Lili Ayala M.D.   DULoxetine (CYMBALTA) 60 MG Cap DR Particles delayed-release capsule Take 1 Capsule by mouth every morning.  UNK at [facility] Lili Ayala M.D.   tizanidine (ZANAFLEX) 2 MG tablet TAKE ONE TABLET BY MOUTH AT BEDTIME AS NEEDED  UNK at [facility] WAYLON [place]P.R.BART   levothyroxine (SYNTHROID) 50 MCG Tab Take 1 Tablet by mouth every morning on an empty stomach.  UNK at [facility] DIANA [place].P.R.N.   Calcium Magnesium Zinc 333-133-5 MG Tab Take 1 Tablet by mouth every day.  UNK at [facility]   docusate sodium (COLACE) 100 MG Cap Take 100 mg by mouth every day.  UNK at [facility]   Naloxone  "(NARCAN) 4 MG/0.1ML Liquid One spray in one nostril for overdose and call 911.  UNK at [facility] [place]       ALLERGIES  No Known Allergies    PHYSICAL EXAM  BP (!) 155/70   Pulse 77   Resp 19   Ht 1.499 m (4' 11\")   Wt 41.3 kg (91 lb)   SpO2 95%   Constitutional: Alert, nontoxic, frail elderly female  HENT: No signs of trauma, Bilateral external ears normal, Nose normal.   Eyes: Pupils are equal and reactive, Conjunctiva normal, Non-icteric.   Neck: Normal range of motion, No tenderness, Supple, No stridor.   Cardiovascular: Regular rate and rhythm, no murmurs.  No chest wall tenderness. Intact distal pulses  Thorax & Lungs: Normal breath sounds, No respiratory distress, No wheezing  Abdomen: Soft, No tenderness, No peritoneal signs, No masses, No pulsatile masses.   Skin: Warm, Dry, No erythema, No rash.   Musculoskeletal: Good range of motion in all major joints. No tenderness to palpation or major deformities noted.   Neurologic: GCS 13 E3V4M6, AxOx0, moving all 4 extremities with full strength, Normal speech, No focal deficits noted.       DIAGNOSTIC STUDIES / PROCEDURES    LABS & EKG  I have independently interpreted this EKG     Results for orders placed or performed during the hospital encounter of 10/14/23   CBC WITH DIFFERENTIAL   Result Value Ref Range    WBC 11.3 (H) 4.8 - 10.8 K/uL    RBC 2.82 (L) 4.20 - 5.40 M/uL    Hemoglobin 8.2 (L) 12.0 - 16.0 g/dL    Hematocrit 26.4 (L) 37.0 - 47.0 %    MCV 93.6 81.4 - 97.8 fL    MCH 29.1 27.0 - 33.0 pg    MCHC 31.1 (L) 32.2 - 35.5 g/dL    RDW 52.8 (H) 35.9 - 50.0 fL    Platelet Count 333 164 - 446 K/uL    MPV 10.3 9.0 - 12.9 fL    Neutrophils-Polys 58.20 44.00 - 72.00 %    Lymphocytes 31.70 22.00 - 41.00 %    Monocytes 8.80 0.00 - 13.40 %    Eosinophils 0.70 0.00 - 6.90 %    Basophils 0.30 0.00 - 1.80 %    Immature Granulocytes 0.30 0.00 - 0.90 %    Nucleated RBC 0.00 0.00 - 0.20 /100 WBC    Neutrophils (Absolute) 6.59 1.82 - 7.42 K/uL    Lymphs " (Absolute) 3.59 1.00 - 4.80 K/uL    Monos (Absolute) 0.99 (H) 0.00 - 0.85 K/uL    Eos (Absolute) 0.08 0.00 - 0.51 K/uL    Baso (Absolute) 0.03 0.00 - 0.12 K/uL    Immature Granulocytes (abs) 0.03 0.00 - 0.11 K/uL    NRBC (Absolute) 0.00 K/uL   COMP METABOLIC PANEL   Result Value Ref Range    Sodium 139 135 - 145 mmol/L    Potassium 3.6 3.6 - 5.5 mmol/L    Chloride 96 96 - 112 mmol/L    Co2 31 20 - 33 mmol/L    Anion Gap 12.0 7.0 - 16.0    Glucose 147 (H) 65 - 99 mg/dL    Bun 46 (H) 8 - 22 mg/dL    Creatinine 1.41 (H) 0.50 - 1.40 mg/dL    Calcium 9.1 8.5 - 10.5 mg/dL    Correct Calcium 9.4 8.5 - 10.5 mg/dL    AST(SGOT) 13 12 - 45 U/L    ALT(SGPT) <5 2 - 50 U/L    Alkaline Phosphatase 51 30 - 99 U/L    Total Bilirubin <0.2 0.1 - 1.5 mg/dL    Albumin 3.6 3.2 - 4.9 g/dL    Total Protein 6.5 6.0 - 8.2 g/dL    Globulin 2.9 1.9 - 3.5 g/dL    A-G Ratio 1.2 g/dL   TROPONIN   Result Value Ref Range    Troponin T 28 (H) 6 - 19 ng/L   LIPASE   Result Value Ref Range    Lipase 25 11 - 82 U/L   LACTIC ACID   Result Value Ref Range    Lactic Acid 2.4 (H) 0.5 - 2.0 mmol/L   proBrain Natriuretic Peptide, NT   Result Value Ref Range    NT-proBNP 4361 (H) 0 - 125 pg/mL   MAGNESIUM   Result Value Ref Range    Magnesium 2.2 1.5 - 2.5 mg/dL   PHOSPHORUS   Result Value Ref Range    Phosphorus 3.3 2.5 - 4.5 mg/dL   COD (ADULT)   Result Value Ref Range    ABO Grouping Only AB     Rh Grouping Only POS     Antibody Screen-Cod NEG    PROTHROMBIN TIME (INR)   Result Value Ref Range    PT 13.7 12.0 - 14.6 sec    INR 1.04 0.87 - 1.13   APTT   Result Value Ref Range    APTT 25.7 24.7 - 36.0 sec   ESTIMATED GFR   Result Value Ref Range    GFR (CKD-EPI) 35 (A) >60 mL/min/1.73 m 2   EKG (NOW)   Result Value Ref Range    Report       [facility] Emergency Dept.    Test Date:  2023-10-14  Pt Name:    KEREN HUFF                Department: ER  MRN:        [id number]                      Room:       [facility]  Gender:     Female                        Technician: [id number]  :        [id number]                   Requested By:TYRA WIN  Order #:    [id number]                    Reading MD: Tyra Win    Measurements  Intervals                                Axis  Rate:       77                           P:          -62  IL:         123                          QRS:        72  QRSD:       149                          T:          266  QT:         470  QTc:        533    Interpretive Statements  Ectopic atrial rhythm rate of 77, normal axis, left bundle branch block  present unchanged from prior, ST elevations in anterior leads consistent with  left bundle, not changed from prior, ST depressions in inferior and lateral  leads also unchanged from prior and consistent with left bundle pattern  Electro nically Signed On 10- 04:47:12 PDT by Tyra Win         RADIOLOGY  I have independently interpreted the diagnostic imaging associated with this visit and am waiting the final reading from the radiologist.   My preliminary interpretation is a follows: CTA abd/pelvis with large stomach with material inside, no obvious bright blood in GI tract, no AAA    Radiologist interpretation:   CTA ABDOMEN PELVIS W & W/O POST PROCESS   Final Result         1. No evidence of active GI bleeding.   2. Gastric distention, which could be secondary to gastric atony/ileus or partial outlet obstruction.      CT-HEAD W/O   Final Result      No acute process.         DX-CHEST-PORTABLE (1 VIEW)   Final Result      No acute process.      DX-PELVIS-1 OR 2 VIEWS   Final Result      Normal exam.          COURSE & MEDICAL DECISION MAKING    ED Observation Status? No; Patient does not meet criteria for ED Observation.     INITIAL ASSESSMENT, COURSE AND PLAN  Care Narrative: 89-year-old female presenting via EMS as STEMI alert.  Syncopal episode at home.  Initial EKG was done, was concerning for ST elevations in the anterior leads with lateral and inferior depressions.   Cardiology arrived at bedside, given the left bundle pattern they felt this did not represent a STEMI and patient was also not complaining of any chest pain at this time.  Per EMS was complaining of some pain at home although was not clear exactly her due to altered mental status.  Old EKG was able to be obtained and current EKG is not changed from the old one. STEMI alert canceled.  We will proceed with work-up for altered mental status, syncope.  Given age, patient will require admission.  We will do sepsis work-up, CT head to rule out intracranial hemorrhage or mass lesion.  Labs, UA.  Patient is not tachycardic or hypotensive at this time, afebrile.  No clear signs of sepsis although given her age this would certainly be considered.  We will give 500 mL fluid bolus.  Patient will likely require admission due to altered mental status.    4:44 AM  Chest x-ray with no pneumothorax, no cardiomegaly or mediastinal widening.  X-ray of the pelvis shows no obvious hip fracture or pelvic fracture.  Osteoporosis present.    5:17 AM  CBC returned with hemoglobin which is fallen 3 points since last labs in the system.  I went to bedside patient has just had a relatively large volume emesis of coffee-ground material.  I did a guaiac which was grossly positive with melenic stool.  I suspect upper GI bleed.  Ordered PPI Protonix bolus and drip.  Spoke with patient's  Keith on the phone, he confirms patient is DNR/DNI, they are agreeable to some resuscitation in the meantime however no heroic measures.  I explained to him so far the findings.  Will obtain CTA abdomen pelvis for evaluation of bleed.  Patient will be admitted.     5:36 AM  Patient with some agitation in CT not wanting to lay flat, 0.5 mg of Ativan ordered after unsuccessful and redirecting.    6:16 AM  CT with no clear location of bleeding seen.  CT head negative.  Gastric distention present.  Rest of labs relatively reassuring, lactic is mildly elevated  likely from anemia and bleeding.  Troponin mildly elevated, proBNP is also markedly elevated, but patient is only requiring oxygen due to the Ativan she needed for CT.  Hospitalist has been paged for admission.  Family at bedside updated on results and plan.    6:37 AM   Accepted for admission by hospitalist Dr. Holland    HYDRATION: Based on the patient's presentation of Lex HIDALGO the patient was given IV fluids. IV Hydration was used because oral hydration was not adequate alone. Upon recheck following hydration, the patient was  .      ADDITIONAL PROBLEM LIST    Acute upper GI bleed  Syncope  Acute blood loss anemia  Altered mental status    DISPOSITION AND DISCUSSIONS  I have discussed management of the patient with the following physicians and CLAUDINE's:  Dr. Holland-- hospitalist    Decision tools and prescription drugs considered including, but not limited to: Antibiotics ceftriaxone .    Admitted to hospitalist in guarded condition      FINAL DIAGNOSIS  1. Disorientation        2. Syncope, unspecified syncope type Acute       3. Acute upper GI bleed Acute       4. Acute blood loss anemia            CRITICAL CARE  The very real possibilty of a deterioration of this patient's condition required the highest level of my preparedness for sudden, emergent intervention.  I provided critical care services, which included medication orders, frequent reevaluations of the patient's condition and response to treatment, ordering and reviewing test results, and discussing the case with various consultants.  The critical care time associated with the care of the patient was 34 minutes. Review chart for interventions. This time is exclusive of any other billable procedures.       Electronically signed by: Tyra Byrnes M.D., 10/14/23 4:35 AM

## 2023-10-14 NOTE — DIETARY
"Nutrition services: Day 0 of admit.  Anthony Lynn is a 89 y.o. female with admitting DX of GIB (gastrointestinal bleeding)    Consult received for wt loss (2-13 lbs in 6 months), poor PO per admit screen; MD consult for poor oral intake, severe protein-calorie malnutrition per problems list. Met with pt and daughter at bedside. Pt was resting in bed, she did not open eyes. Interview and Nutrition Focused Physical Exam completed with daughter. Pt with severe fat loss evidenced by prominent zygomatic arches, hollowed orbitals; severe muscle loss evidenced by squared shoulders, prominent clavicles and prominent brow bone. Pt's daughter stated that pt had a poor appetite prior to admission for ~2 weeks to one month. Daughter stated up until that time pt had been consuming 2 Ensure supplements per day, but recently has been refusing them. Daughter reported an approximate 10 lb wt loss recently, with a previous UBW of 110-115 lbs, though daughter shared that pt has always been small. Pt is currently NPO. RD will add supplement order to optimize nutrition and bolster intake once diet advances. Additional meal preferences to be obtained by Nutrition Representative.    Assessment:  Height: 149.9 cm (4' 11\")  Weight: 41.3 kg (91 lb)  Body mass index is 18.38 kg/m²., BMI classification: underweight  Diet/Intake: NPO    Evaluation:   Admitted with chest pain.   PMH: SUMA, coronary artery disease, dyslipidemia, GERD, IBS, indigestion, myocardial infarct, renal insufficiency, unspecified disorder of thyroid, fibromyalgia.   Wt hx per chart review: 105 lbs 6/6/23, 115 lbs 4/5/23, 115 lbs 12/28/22, 117 lbs 5/31/22. Wt loss of 13% in four months is severe.   CT showed gastric distention. Pt also has reduced hemoglobin.   GI saw pt today for hematemesis. Hx of abdominal pain and early satiety per GI note. Plan for EGD tomorrow.     Malnutrition Risk: Pt with severe, chronic malnutrition related to abdominal pain with loss of " appetite, AEB physical markers for severe fat and severe muscle loss as noted above and severe wt loss of 13% in four months.     Recommendations/Plan:  Advance diet as tolerated per MD.  Oral nutrition supplements BID.  Encourage intake of meals and supplements.  Document intake of all meals and supplements as % taken in ADLs to provide interdisciplinary communication across all shifts.   Monitor weight.  Nutrition rep will continue to see patient for ongoing meal and snack preferences.     RD will follow.

## 2023-10-14 NOTE — H&P
Hospital Medicine History & Physical Note    Date of Service  10/14/2023    Primary Care Physician  Lili Ayala M.D.      Code Status  DNAR/DNI    Chief Complaint  Chief Complaint   Patient presents with    Chest Pain     STEMI Pre Alert       History of Presenting Illness  Anthony Lynn is a 89 y.o. female with past medical history of peripheral vascular disease, hypertension, hypothyroidism, GERD, CKD stage III, CAD, MI, who presented 10/14/2023 after a syncopal episode, with possible STEMI.  EKG showed LBBB not significantly changed from prior EKG in 2018.  Cardiology consulted and ruled out STEMI.  Patient is currently sedated with Ativan that was used for obtaining CT of the abdomen, patient was not able to cooperate initially.  She cannot provide history.  According to her daughters who are at bedside, patient has been declining.  According to the patient's daughter, she was taking Excedrin, denies taking other NSAID.  No prior history of GI bleed or endoscopy. She has chronic diffuse abdominal pain.  Lately she was eating less and less, has been losing weight, complaining of dizziness and had frequent falls.  Intermittently she has been having vomiting with coffee-ground.  In ER her FOBT positive and she apparently has gross melena.  She did have vomiting with coffee-ground in ER as well.  Her hemoglobin 8.2 down from 11.5 on 11/30/2022.  CTA of the abdomen negative for active bleeding.  There is gastric distention, secondary to gastric atonia, ileus or partial outlet obstruction.      I discussed the plan of care with patient and ERP, patient's daughters .    Review of Systems  Review of Systems   Unable to perform ROS: Mental status change       Past Medical History   has a past medical history of SUMA (acute kidney injury) (HCC) (7/25/2018), Anxiety, Arthritis, CAD (coronary artery disease), CATARACT, Dental disorder, Dyslipidemia, Fibromyalgia, Generalized abdominal pain (10/14/2021), GERD  (gastroesophageal reflux disease), Glaucoma, Headache(784.0), Hives, Hives, HTN, IBS (irritable bowel syndrome), Indigestion, Major depression in remission ([facility]) (5/31/2022), Myocardial infarct ([facility]), PAD (peripheral artery disease) ([facility]) (5/31/2022), Pain (7/31/15), Palpitations, Psychiatric problem, Renal insufficiency (4/12/2011), Snoring, Toe dislocation (5/17/2012), Unspecified disorder of thyroid, and Unspecified urinary incontinence.    Surgical History   has a past surgical history that includes other; cataract extraction with iol; and toe amputation (Left, 8/6/2015).     Family History  family history includes Heart Disease in her father; Lung Disease in her father.   Family history reviewed with patient. There is no family history that is pertinent to the chief complaint.     Social History   reports that she has never smoked. She has never used smokeless tobacco. She reports that she does not drink alcohol and does not use drugs.    Allergies  Allergies   Allergen Reactions    Nkda [No Known Drug Allergy]        Medications  Prior to Admission Medications   Prescriptions Last Dose Informant Patient Reported? Taking?   Calcium Magnesium Zinc 333-133-5 MG Tab UNK at UNK Family Member Yes No   Sig: Take 1 Tablet by mouth every day.   DULoxetine (CYMBALTA) 30 MG Cap DR Particles UNK at UNK Family Member No No   Sig: Take 1 Capsule by mouth every evening.   DULoxetine (CYMBALTA) 60 MG Cap DR Particles delayed-release capsule UNK at UNK Family Member No No   Sig: Take 1 Capsule by mouth every morning.   Naloxone (NARCAN) 4 MG/0.1ML Liquid UNK at UNK Family Member No No   Sig: One spray in one nostril for overdose and call 911.   OMEPRAZOLE PO UNK at UNK  Yes Yes   Sig: Take 1 Dose by mouth every day.   docusate sodium (COLACE) 100 MG Cap UNK at UNK Family Member Yes No   Sig: Take 100 mg by mouth every day.   levothyroxine (SYNTHROID) 50 MCG Tab UNK at UNK Family Member No No   Sig: Take 1 Tablet by mouth every  morning on an empty stomach.   lisinopril (PRINIVIL) 20 MG Tab UNK at UNK Family Member No No   Sig: Take 1 Tablet by mouth 2 times a day.   tizanidine (ZANAFLEX) 2 MG tablet UNK at UNK Family Member No No   Sig: TAKE ONE TABLET BY MOUTH AT BEDTIME AS NEEDED   traMADol (ULTRAM) 50 MG Tab UNK at UNK Family Member No No   Sig: Take 1 Tablet by mouth every 8 hours as needed for Moderate Pain or Severe Pain for up to 30 days.      Facility-Administered Medications: None       Physical Exam  Pulse:  [74-77] 77  Resp:  [17-20] 19  BP: (114-155)/(67-70) 155/70  SpO2:  [94 %-95 %] 95 %  Blood Pressure : (!) 155/70       Pulse: 77   Respiration: 19   Pulse Oximetry: 95 %       Physical Exam  Vitals and nursing note reviewed.   Constitutional:       General: She is not in acute distress.     Appearance: She is ill-appearing.   HENT:      Head: Normocephalic and atraumatic.      Nose: Nose normal.      Mouth/Throat:      Mouth: Mucous membranes are moist.   Eyes:      Extraocular Movements: Extraocular movements intact.      Pupils: Pupils are equal, round, and reactive to light.   Cardiovascular:      Rate and Rhythm: Normal rate and regular rhythm.   Pulmonary:      Effort: Pulmonary effort is normal.      Breath sounds: Normal breath sounds.   Abdominal:      General: Abdomen is flat. There is no distension.      Tenderness: There is no abdominal tenderness.   Musculoskeletal:         General: No swelling or deformity. Normal range of motion.      Cervical back: Normal range of motion and neck supple.   Skin:     General: Skin is warm.      Coloration: Skin is pale.   Neurological:      General: No focal deficit present.      Mental Status: She is alert. She is disoriented.   Psychiatric:         Mood and Affect: Mood normal.         Behavior: Behavior normal.         Laboratory:  Recent Labs     10/14/23  0429   WBC 11.3*   RBC 2.82*   HEMOGLOBIN 8.2*   HEMATOCRIT 26.4*   MCV 93.6   MCH 29.1   MCHC 31.1*   RDW 52.8*    PLATELETCT 333   MPV 10.3     Recent Labs     10/14/23  0429   SODIUM 139   POTASSIUM 3.6   CHLORIDE 96   CO2 31   GLUCOSE 147*   BUN 46*   CREATININE 1.41*   CALCIUM 9.1     Recent Labs     10/14/23  0429   ALTSGPT <5   ASTSGOT 13   ALKPHOSPHAT 51   TBILIRUBIN <0.2   LIPASE 25   GLUCOSE 147*     Recent Labs     10/14/23  0429   APTT 25.7   INR 1.04     Recent Labs     10/14/23  0429   NTPROBNP 4361*         Recent Labs     10/14/23  0429   TROPONINT 28*       Imaging:  CTA ABDOMEN PELVIS W & W/O POST PROCESS   Final Result         1. No evidence of active GI bleeding.   2. Gastric distention, which could be secondary to gastric atony/ileus or partial outlet obstruction.      CT-HEAD W/O   Final Result      No acute process.         DX-CHEST-PORTABLE (1 VIEW)   Final Result      No acute process.      DX-PELVIS-1 OR 2 VIEWS   Final Result      Normal exam.          X-Ray:  I have personally reviewed the images and compared with prior images.    Assessment/Plan:  Justification for Admission Status  I anticipate this patient will require at least two midnights for appropriate medical management, necessitating inpatient admission because GI bleed    Patient will need a Telemetry bed on MEDICAL service .  The need is secondary to GI bleed.    * GIB (gastrointestinal bleeding) with acute on chronic blood loss anemia- (present on admission)  Assessment & Plan  89-year-old female with history of GERD presented with coffee-ground emesis and melena, hemoglobin 8.2, no prior history of GI bleed reported.  CT of the abdomen with contrast showed no active bleeding.  There is gastric distention of unclear etiology  Plan: Admit to telemetry vitals every 4 hours.  Strict bedrest for 8 hours  We will plan to consult GI today  Continue IV Protonix  H&H, transfuse RBC for hemoglobin 8.0 and below or new episodes of bleeding.  IV fluid support  Zofran as needed        Severe protein-calorie malnutrition (HCC)  Assessment &  Plan  Dietitian consult    Other specified hypothyroidism- (present on admission)  Assessment & Plan  Resume levothyroxine  Recheck TSH due to concern for disorientation and confusion    Stage 3b chronic kidney disease (HCC)- (present on admission)  Assessment & Plan  Avoid nephrotoxins    GERD (gastroesophageal reflux disease)- (present on admission)  Assessment & Plan  IV Protonix for now    Primary hypertension- (present on admission)  Assessment & Plan  Continue lisinopril  IV labetalol as needed  Monitor blood pressure        VTE prophylaxis: SCDs/TEDs

## 2023-10-14 NOTE — PROGRESS NOTES
Received report from JAYNA Abebe (ER). Pt is A&O x 0, unable to answer questions at this time. Responds to voice. Pt transported via gurney with ACLS ACT and zoll monitor. Pt arrived to unit, tele box on patient, confirmed with  (CCT) from monitor room. Pt transported from gurney to bed. Pt oriented to unit and call light, verbalized understanding. Fall precautions in place. Call light and bedside table within reach, bed locked in lowest position with controls on. Assessment completed. Daughter at bedside, completed admit profile with daughter.  Will continue to monitor.

## 2023-10-14 NOTE — CARE PLAN
The patient is Watcher - Medium risk of patient condition declining or worsening    Shift Goals  Clinical Goals: monitor H&H  Family Goals: rest    Progress made toward(s) clinical / shift goals:  Pt confused at this time. Daughter at bedside. Daughter updated on POC and EGD tomorrow am.    Patient is not progressing towards the following goals:      Problem: Knowledge Deficit - Standard  Goal: Patient and family/care givers will demonstrate understanding of plan of care, disease process/condition, diagnostic tests and medications  Description: Target End Date:  1-3 days or as soon as patient condition allows    Document in Patient Education    1.  Patient and family/caregiver oriented to unit, equipment, visitation policy and means for communicating concern  2.  Complete/review Learning Assessment  3.  Assess knowledge level of disease process/condition, treatment plan, diagnostic tests and medications  4.  Explain disease process/condition, treatment plan, diagnostic tests and medications  Outcome: Not Progressing

## 2023-10-14 NOTE — ED TRIAGE NOTES
"Chief Complaint   Patient presents with    Chest Pain     STEMI Pre Alert     Patient ROSARIO [facility] from home for chest pain and a STEMI rule out. Patient noted to have elevation in 3 leads per EMS and pre alert was called. Patient is baseline AAO1 - self only.     /67   Pulse 76   Resp 17   Ht 1.499 m (4' 11\")   Wt 41.3 kg (91 lb)   SpO2 95%    "

## 2023-10-14 NOTE — ASSESSMENT & PLAN NOTE
Came with hematemesis and coffee-ground  Patient was found to have anemia  EGD was done on 10/15 and showed 2 gastric ulcers with the stenosis  Planning to repeat EGD for dilation after decreasing the inflammation  Continue PPI twice daily IV for 3 days  GI on board, appreciate recommendations

## 2023-10-14 NOTE — PROGRESS NOTES
89-year-old female with history of dementia, hypertension, hypothyroidism and chronic kidney disease with coronary artery disease who presented 10/14 with syncope and fall.  Patient lives with her daughter, patient not able to provide history and according to family, patient has been having generalized weakness with low appetite and losing weight, patient had recurrent falls and came with fall on admission.  On admission EKG showed left bundle branch block and STEMI code was called, cardiology evaluated the patient, discontinued the code and heparin drip, and continue medical management with no intervention needed since patient does not have acute coronary syndrome.  However patient developed also coffee-ground emesis and hemoglobin dropped from 11.5-7.7, GI was consulted and planning for EGD.        The patient is alert oriented times0-1, patient has significant cachectic appearance and muscles wast, oriented keep watching her hemoglobin and transfusion less than 7 since patient does not have acute coronary syndrome by cardiology noted.    Patient has advanced dementia with significant malnutrition and muscles wast, patient has been deteriorating last couple months, the plan of care was discussed in detail with the patient daughter answered all their question, discussed the poor prognosis also discussed all treatment options including comfort care, family is not ready for comfort care at this time however did not want any aggressive procedures, agreed for EGD if needed.  Agreed for DNR/DNI.  Time 30 minutes.

## 2023-10-15 PROBLEM — F09 COGNITIVE DISORDER: Status: ACTIVE | Noted: 2021-09-23

## 2023-10-15 NOTE — CARE PLAN
The patient is Stable - Low risk of patient condition declining or worsening    Shift Goals  Clinical Goals: monitor H&H  Family Goals: rest    Progress made toward(s) clinical / shift goals:    Problem: Knowledge Deficit - Standard  Goal: Patient and family/care givers will demonstrate understanding of plan of care, disease process/condition, diagnostic tests and medications  Description: Target End Date:  1-3 days or as soon as patient condition allows    Document in Patient Education    1.  Patient and family/caregiver oriented to unit, equipment, visitation policy and means for communicating concern  2.  Complete/review Learning Assessment  3.  Assess knowledge level of disease process/condition, treatment plan, diagnostic tests and medications  4.  Explain disease process/condition, treatment plan, diagnostic tests and medications  10/15/2023 0202 by Ankita Slade, Student  Outcome: Progressing  Note: Pt updated on POC, all questions answered at this time.    Problem: Fall Risk  Goal: Patient will remain free from falls  Description: Target End Date:  Prior to discharge or change in level of care    Document interventions on the Nasrin Cash Fall Risk Assessment    1.  Assess for fall risk factors  2.  Implement fall precautions  10/15/2023 0202 by Ankita Slade, Student  Outcome: Progressing  Note: Pt is a high fall risk. Bed is in lowest, locked position, bedrails x3 in place. Pt call light within reach, treaded socks in place. Tele sitter ordered for pt, as they have dementia and get out of bed without calling.

## 2023-10-15 NOTE — ANESTHESIA TIME REPORT
Anesthesia Start and Stop Event Times     Date Time Event    10/15/2023 0911 Anesthesia Start     0936 Anesthesia Stop        Responsible Staff  10/15/23    Name Role Begin End    Jean Jalloh M.D. Anesth 0911 0936        Overtime Reason:  no overtime (within assigned shift)    Comments:

## 2023-10-15 NOTE — PROGRESS NOTES
Hospital Medicine Daily Progress Note    Date of Service  10/15/2023    Chief Complaint  Anthony Lynn is a 89 y.o. female admitted 10/14/2023 with weakness    Hospital Course  89-year-old female with history of dementia, hypertension, hypothyroidism and chronic kidney disease with coronary artery disease who presented 10/14 with syncope and fall.  Patient lives with her daughter, patient not able to provide history and according to family, patient has been having generalized weakness with low appetite and losing weight, patient had recurrent falls and came with fall on admission.  On admission EKG showed left bundle branch block and STEMI code was called, cardiology evaluated the patient, discontinued the code and heparin drip, and continue medical management with no intervention needed since patient does not have acute coronary syndrome.  However patient developed also coffee-ground emesis and hemoglobin dropped from 11.5-7.7, GI was consulted, and patient underwent EGD which showed 2 gastric ulcers at pylorus extending to duodenal bulb, scope could not pass due to very narrowing opening around 1-2 mm from acute inflammation and a chronic deformity, patient is on IV PPI twice daily and planning to repeat EGD for dilation           The patient has moderate to advanced dementia, patient has significant cachectic appearance with muscles loss, patient was alert oriented times0-1, plan was discussed with the patient and her family including daughter, answered all their questions.        Interval Problem Update  -Evaluated examined the patient at bedside, patient is alert and oriented around 1-2 denied any pain.  -EGD was done and showed 2 gastric ulcers with the stenosis, continue PPI IV twice daily  -EGD finding was discussed in detail with the patient and her daughter, answered all other questions      I have discussed this patient's plan of care and discharge plan at IDT rounds today with Case Management,  Nursing, Nursing leadership, and other members of the IDT team.    Consultants/Specialty  GI    Code Status  DNAR/DNI    Disposition  The patient is not medically cleared for discharge to home or a post-acute facility.  Anticipate discharge to: skilled nursing facility    I have placed the appropriate orders for post-discharge needs.    Review of Systems  Review of Systems   Constitutional:  Negative for chills, fever and weight loss.   HENT:  Negative for ear pain, hearing loss and tinnitus.    Eyes:  Negative for blurred vision, double vision and photophobia.   Respiratory:  Negative for cough and hemoptysis.    Cardiovascular:  Negative for chest pain, palpitations, orthopnea and claudication.   Gastrointestinal:  Negative for abdominal pain, constipation, diarrhea, nausea and vomiting.   Genitourinary:  Negative for dysuria, frequency and urgency.   Musculoskeletal:  Negative for myalgias and neck pain.   Skin:  Negative for rash.   Neurological:  Negative for dizziness, speech change and weakness.        Physical Exam  Temp:  [36.4 °C (97.5 °F)-37.2 °C (99 °F)] 36.7 °C (98.1 °F)  Pulse:  [63-86] 74  Resp:  [16-23] 16  BP: ()/(48-75) 113/67  SpO2:  [91 %-100 %] 95 %    Physical Exam  Constitutional:       Appearance: She is ill-appearing. She is not toxic-appearing.   HENT:      Head: Normocephalic and atraumatic.   Eyes:      General: No scleral icterus.  Cardiovascular:      Rate and Rhythm: Normal rate.      Heart sounds: No murmur heard.  Pulmonary:      Effort: No respiratory distress.      Breath sounds: No wheezing.   Abdominal:      General: There is no distension.      Palpations: Abdomen is soft.      Tenderness: There is no abdominal tenderness.   Skin:     General: Skin is dry.      Coloration: Skin is pale.      Findings: Bruising present. No erythema, lesion or rash.   Neurological:      Mental Status: Mental status is at baseline. She is disoriented.      Cranial Nerves: No cranial nerve  deficit.         Fluids    Intake/Output Summary (Last 24 hours) at 10/15/2023 1600  Last data filed at 10/15/2023 0936  Gross per 24 hour   Intake 100 ml   Output --   Net 100 ml       Laboratory  Recent Labs     10/14/23  0429 10/14/23  0823 10/14/23  1834 10/15/23  0251 10/15/23  1051   WBC 11.3*  --   --  6.2  --    RBC 2.82*  --   --  2.47*  --    HEMOGLOBIN 8.2*   < > 8.4* 7.3* 7.6*   HEMATOCRIT 26.4*   < > 25.8* 22.4* 23.5*   MCV 93.6  --   --  90.7  --    MCH 29.1  --   --  29.6  --    MCHC 31.1*  --   --  32.6  --    RDW 52.8*  --   --  51.0*  --    PLATELETCT 333  --   --  245  --    MPV 10.3  --   --  10.4  --     < > = values in this interval not displayed.     Recent Labs     10/14/23  0429 10/15/23  0251   SODIUM 139 134*   POTASSIUM 3.6 3.6   CHLORIDE 96 102   CO2 31 23   GLUCOSE 147* 87   BUN 46* 38*   CREATININE 1.41* 1.23   CALCIUM 9.1 7.7*     Recent Labs     10/14/23  0429   APTT 25.7   INR 1.04               Imaging  EC-ECHOCARDIOGRAM COMPLETE W/O CONT   Final Result      CTA ABDOMEN PELVIS W & W/O POST PROCESS   Final Result         1. No evidence of active GI bleeding.   2. Gastric distention, which could be secondary to gastric atony/ileus or partial outlet obstruction.      CT-HEAD W/O   Final Result      No acute process.         DX-CHEST-PORTABLE (1 VIEW)   Final Result      No acute process.      DX-PELVIS-1 OR 2 VIEWS   Final Result      Normal exam.           Assessment/Plan  * GIB (gastrointestinal bleeding) with acute on chronic blood loss anemia- (present on admission)  Assessment & Plan  Came with hematemesis and coffee-ground  Patient was found to have anemia  EGD was done on 10/15 and showed 2 gastric ulcers with the stenosis  Planning to repeat EGD for dilation  Continue PPI twice daily IV for 3 days  GI on board, appreciate recommendations      Cognitive disorder- (present on admission)  Assessment & Plan  Patient has short memory deficit, dementia  Patient is alert and oriented  x1-2  Patient is on high risk for delirium    Severe protein-calorie malnutrition (HCC)  Assessment & Plan  Dietitian consult    PAD (peripheral artery disease) (HCC)- (present on admission)  Assessment & Plan  Holding blood thinner due to GI bleeding  Continue lisinopril and consider statin, patient has significant malnutrition with muscles loss    Major depressive disorder with single episode, in full remission (HCC)- (present on admission)  Assessment & Plan  Continue duloxetine    Other specified hypothyroidism- (present on admission)  Assessment & Plan  Resume levothyroxine  Recheck TSH due to concern for disorientation and confusion    Stage 3b chronic kidney disease (HCC)- (present on admission)  Assessment & Plan  Around her baseline with creatinine 1.2  avoid nephrotoxins    GERD (gastroesophageal reflux disease)- (present on admission)  Assessment & Plan  IV PPI twice daily    Primary hypertension- (present on admission)  Assessment & Plan  Continue lisinopril  Avoid aggressive since patient has anemia         VTE prophylaxis:   SCDs/TEDs   pharmacologic prophylaxis contraindicated due to Significant gastric ulcer, high risk for bleeding      I have performed a physical exam and reviewed and updated ROS and Plan today (10/15/2023). In review of yesterday's note (10/14/2023), there are no changes except as documented above.    Greater than 51 minutes spent prepping to see patient (e.g. review of tests) obtaining and/or reviewing separately obtained history. Performing a medically appropriate examination and/ evaluation.  Counseling and educating the patient/family/caregiver.  Ordering medications, tests, or procedures.  Referring and communicating with other health care professionals.  Documenting clinical information in EPIC.  Independently interpreting results and communicating results to patient/family/caregiver.  Care coordination

## 2023-10-15 NOTE — ASSESSMENT & PLAN NOTE
Holding blood thinner due to GI bleeding  Continue lisinopril and consider statin, patient has significant malnutrition with muscles loss

## 2023-10-15 NOTE — PROGRESS NOTES
Cardiology Follow Up Progress Note    Date of Service  10/15/2023    Attending Physician  Hussain Currie M.D.    Chief Complaint   STEMI activation canceled,     EKG left bundle branch block not significantly changed from prior EKG 2018    HPI  Anthony Lynn is a 89 y.o. female admitted 10/14/2023 with syncope.      PMH: Hypertension, hypothyroid, CKD stage III    Interim Events    Underwent EGD this morning showed 2 gastric ulcers on IV PPI.  H&H= 7.3/22.4  No overnight cardiac events on telemetry  Telemetry-SR  BP appropriate  Denies having any chest pain  Review of Systems  Review of Systems   Respiratory:  Negative for apnea, cough, choking, chest tightness, shortness of breath and stridor.        Vital signs in last 24 hours  Temp:  [36.4 °C (97.5 °F)-37.2 °C (99 °F)] 36.7 °C (98.1 °F)  Pulse:  [63-86] 67  Resp:  [16-23] 16  BP: ()/(48-70) 132/59  SpO2:  [91 %-100 %] 93 %    Physical Exam  Physical Exam  Cardiovascular:      Rate and Rhythm: Normal rate and regular rhythm.      Pulses: Normal pulses.   Skin:     General: Skin is warm.   Neurological:      General: No focal deficit present.      Mental Status: She is alert.   Psychiatric:         Mood and Affect: Mood normal.         Lab Review  Lab Results   Component Value Date/Time    WBC 6.2 10/15/2023 02:51 AM    WBC 5.8 11/04/2010 01:16 PM    RBC 2.47 (L) 10/15/2023 02:51 AM    RBC 4.04 11/04/2010 01:16 PM    HEMOGLOBIN 7.6 (L) 10/15/2023 10:51 AM    HEMATOCRIT 23.5 (L) 10/15/2023 10:51 AM    MCV 90.7 10/15/2023 02:51 AM    MCV 96 11/04/2010 01:16 PM    MCH 29.6 10/15/2023 02:51 AM    MCH 31.2 11/04/2010 01:16 PM    MCHC 32.6 10/15/2023 02:51 AM    MPV 10.4 10/15/2023 02:51 AM      Lab Results   Component Value Date/Time    SODIUM 134 (L) 10/15/2023 02:51 AM    POTASSIUM 3.6 10/15/2023 02:51 AM    CHLORIDE 102 10/15/2023 02:51 AM    CO2 23 10/15/2023 02:51 AM    GLUCOSE 87 10/15/2023 02:51 AM    BUN 38 (H) 10/15/2023 02:51 AM    CREATININE  1.23 10/15/2023 02:51 AM    CREATININE 1.21 (H) 12/10/2010 02:40 PM    BUNCREATRAT 17.9 01/19/2022 10:51 PM    BUNCREATRAT 18 11/04/2010 01:16 PM    GLOMRATE 43 (L) 12/10/2010 02:40 PM      Lab Results   Component Value Date/Time    ASTSGOT 11 (L) 10/15/2023 02:51 AM    ALTSGPT 5 10/15/2023 02:51 AM     Lab Results   Component Value Date/Time    CHOLSTRLTOT 195 11/30/2022 11:31 AM     (H) 11/30/2022 11:31 AM    HDL 63 11/30/2022 11:31 AM    TRIGLYCERIDE 97 11/30/2022 11:31 AM    TROPONINT 25 (H) 10/14/2023 08:32 AM       Recent Labs     10/14/23  0429   NTPROBNP 4361*         Assessment/Plan    Syncope  Etiology is not entirely clear  Suspect acute GI bleed  No cardiac events on telemetry  Troponin 20s  -Echo LVEF 55 to 60% normal regional wall motion  -No plan for invasive cardiac work-up  -Discussed with patient and her daughters and they are in agreement patient is currently chest pain-free    Acute GI bleed  Status post blood transfusion 10/14/2023  -Underwent EGD this morning showed gastric ulcers  -On PPI  -On clears    No further cardiac work-up  Cardiology will sign off    I personally spent a total of 15 minutes which includes face-to-face time and non-face-to-face time spent on preparing to see the patient, reviewing hospital notes and tests, obtaining history from the patient, performing a medically appropriate exam, counseling and educating the patient, ordering medications/tests/procedures/referrals as clinically indicated, and documenting information in the electronic medical record.     Please contact me with any questions.    CIERRA Tirado.   Cardiologist, [facility]  [phone]

## 2023-10-15 NOTE — PROGRESS NOTES
Monitor Summary     Rhythm: SR  Heart Rate: 62-77  Ectopy: R PVC  Measurement: .17/.13/.43

## 2023-10-15 NOTE — ANESTHESIA POSTPROCEDURE EVALUATION
Patient: Anthony Lynn    Procedure Summary     Date: 10/15/23 Room / Location: [facility] 06 / SURGERY [facility]    Anesthesia Start: 0911 Anesthesia Stop: 0936    Procedures:       GASTROSCOPY (Esophagus)      GASTROSCOPY, WITH BIOPSY (Esophagus) Diagnosis: (gastric ulcer, hiatal hernia, gerd)    Surgeons: Karrie Morales M.D. Responsible Provider: Jean Jalloh M.D.    Anesthesia Type: MAC ASA Status: 3 - Emergent          Final Anesthesia Type: MAC  Last vitals  BP   Blood Pressure : (!) 152/67    Temp   36.6 °C (97.9 °F)    Pulse   66   Resp   16    SpO2   95 %      Anesthesia Post Evaluation    Patient location during evaluation: PACU  Patient participation: complete - patient participated  Level of consciousness: sleepy but conscious  Pain score: 0    Airway patency: patent  Anesthetic complications: no  Cardiovascular status: hemodynamically stable  Respiratory status: acceptable  Hydration status: balanced    PONV: none          There were no known notable events for this encounter.     Nurse Pain Score: 0 (NPRS)

## 2023-10-15 NOTE — PROGRESS NOTES
Bedside report received from day RN, pt care assumed, assessment completed. Pt is A&O to self only, denies pain at this time, SR on the monitor. Pt and family updated on POC, questions answered. Bed in lowest, locked position, treaded socks on, call light and belongings within reach.

## 2023-10-15 NOTE — HOSPITAL COURSE
89-year-old female with history of dementia, hypertension, hypothyroidism and chronic kidney disease with coronary artery disease who presented 10/14 with syncope and fall.  Patient lives with her daughter, patient not able to provide history and according to family, patient has been having generalized weakness with low appetite and losing weight, patient had recurrent falls and came with fall on admission.  On admission EKG showed left bundle branch block and STEMI code was called, cardiology evaluated the patient, discontinued the code and heparin drip, and continue medical management with no intervention needed since patient does not have acute coronary syndrome.  However patient developed also coffee-ground emesis and hemoglobin dropped from 11.5-7.7, GI was consulted, and patient underwent EGD which showed 2 gastric ulcers at pylorus extending to duodenal bulb, scope could not pass due to very narrowing opening around 1-2 mm from acute inflammation and a chronic deformity, patient is on IV PPI twice daily and planning to repeat EGD for dilation           The patient has moderate to advanced dementia, patient has significant cachectic appearance with muscles loss, patient was alert oriented times0-1, plan was discussed with the patient and her family including daughter, answered all their questions.

## 2023-10-15 NOTE — ANESTHESIA PREPROCEDURE EVALUATION
Case: [id number] Date/Time: 10/15/23 0905    Procedure: GASTROSCOPY (Esophagus)    Anesthesia type: MAC    Pre-op diagnosis: hematemsis    Location: [facility] 06 / SURGERY [facility]    Surgeons: Karrie Morales M.D.        Past Medical History:   Diagnosis Date   • SUMA (acute kidney injury) ([facility]) 7/25/2018   • Anxiety    • Arthritis    • CAD (coronary artery disease)    • CATARACT      B/L   • Dental disorder     upper and lower dentures   • Dyslipidemia    • Fibromyalgia    • Generalized abdominal pain 10/14/2021   • GERD (gastroesophageal reflux disease)    • Glaucoma    • Headache(784.0)    • Hives    • Hives    • HTN    • IBS (irritable bowel syndrome)    • Indigestion    • Major depression in remission ([facility]) 5/31/2022   • Myocardial infarct ([facility])     2005   • PAD (peripheral artery disease) ([facility]) 5/31/2022   • Pain 7/31/15    all over 3/10 (hx of fibromyalgia   • Palpitations    • Psychiatric problem     depression   • Renal insufficiency 4/12/2011   • Snoring    • Toe dislocation 5/17/2012   • Unspecified disorder of thyroid     hypothroidism, no longer on meds   • Unspecified urinary incontinence      Past Surgical History:   Procedure Laterality Date   • TOE AMPUTATION Left 8/6/2015    Procedure: TOE AMPUTATION 2ND;  Surgeon: ANY RoldanPNARESH;  Location: SURGERY [facility];  Service:    • CATARACT EXTRACTION WITH IOL     • OTHER      cataracts removed     Current Outpatient Medications   Medication Instructions   • Calcium Magnesium Zinc 333-133-5 MG Tab 1 Tablet, Oral, DAILY   • docusate sodium (COLACE) 100 mg, Oral, DAILY   • DULoxetine (CYMBALTA) 30 mg, Oral, EVERY EVENING   • DULoxetine (CYMBALTA) 60 mg, Oral, EVERY MORNING   • levothyroxine (SYNTHROID) 50 mcg, Oral, EACH MORNING ON EMPTY STOMACH   • lisinopril (PRINIVIL) 20 mg, Oral, 2 TIMES DAILY   • Naloxone (NARCAN) 4 MG/0.1ML Liquid One spray in one nostril for overdose and call 911.   • OMEPRAZOLE PO 1 Dose, Oral, DAILY   • tizanidine  (ZANAFLEX) 2 MG tablet TAKE ONE TABLET BY MOUTH AT BEDTIME AS NEEDED   • traMADol (ULTRAM) 50 mg, Oral, EVERY 8 HOURS PRN     Lab Results   Component Value Date/Time    SODIUM 134 (L) 10/15/2023 02:51 AM    POTASSIUM 3.6 10/15/2023 02:51 AM    CHLORIDE 102 10/15/2023 02:51 AM    CO2 23 10/15/2023 02:51 AM    GLUCOSE 87 10/15/2023 02:51 AM    BUN 38 (H) 10/15/2023 02:51 AM    CREATININE 1.23 10/15/2023 02:51 AM    CREATININE 1.21 (H) 12/10/2010 02:40 PM    BUNCREATRAT 17.9 01/19/2022 10:51 PM    BUNCREATRAT 18 11/04/2010 01:16 PM    GLOMRATE 43 (L) 12/10/2010 02:40 PM      Lab Results   Component Value Date/Time    WBC 6.2 10/15/2023 02:51 AM    WBC 5.8 11/04/2010 01:16 PM    RBC 2.47 (L) 10/15/2023 02:51 AM    RBC 4.04 11/04/2010 01:16 PM    HEMOGLOBIN 7.3 (L) 10/15/2023 02:51 AM    HEMATOCRIT 22.4 (L) 10/15/2023 02:51 AM    MCV 90.7 10/15/2023 02:51 AM    MCV 96 11/04/2010 01:16 PM    MCH 29.6 10/15/2023 02:51 AM    MCH 31.2 11/04/2010 01:16 PM    MCHC 32.6 10/15/2023 02:51 AM    MPV 10.4 10/15/2023 02:51 AM    NEUTSPOLYS 51.00 10/15/2023 02:51 AM    LYMPHOCYTES 35.80 10/15/2023 02:51 AM    MONOCYTES 11.10 10/15/2023 02:51 AM    EOSINOPHILS 1.50 10/15/2023 02:51 AM    BASOPHILS 0.30 10/15/2023 02:51 AM      Vitals:    10/15/23 0756   BP: (!) 158/65   Pulse: 75   Resp: 16   Temp: 36.6 °C (97.9 °F)   SpO2: 95%     Allergies   Allergen Reactions   • Nkda [No Known Drug Allergy]      TTE 10/14/2023:  CONCLUSIONS  Prior echocardiogram 11/15/2013. No significant changes  Sinus rhythm with bundle branch block during exam  Hypertensive during exam  Normal left ventricular chamber size with normal left ventricular wall thickness.   Normal left ventricular systolic function with left ventricular   ejection fraction is visually estimated to be 55-60%.   Normal regional wall motion.   Grade I diastolic dysfunction.  Aortic valve sclerosis without significant stenosis    Relevant Problems   CARDIAC   (positive) Atherosclerosis  of aorta (HCC)   (positive) LBBB (left bundle branch block)   (positive) PAD (peripheral artery disease) ([facility])   (positive) Primary hypertension   (positive) Sinus bradycardia   (positive) Tachyarrhythmia      GI   (positive) GERD (gastroesophageal reflux disease)         (positive) Stage 3b chronic kidney disease (HCC)      ENDO   (positive) Other specified hypothyroidism       Physical Exam    Airway - unable to assess  Mallampati: II  TM distance: >3 FB  Neck ROM: full       Cardiovascular - normal exam  Rhythm: regular  Rate: normal  (-) murmur     Dental   (+) upper dentures, lower dentures           Pulmonary - normal exam  Breath sounds clear to auscultation     Abdominal    Neurological - normal exam                 Anesthesia Plan    ASA 3- EMERGENT   ASA physical status 3 criteria: CAD/stents (> 3 months)ASA physical status emergent criteria: acute hemorrhage    Plan - MAC               Induction: intravenous      Pertinent diagnostic labs and testing reviewed    Informed Consent:  Emergent - Consent given by clinician  Anesthetic plan and risks discussed with healthcare power of .      Anesthetic plan discussed with patient's daughter in detail. Plan for MAC with deep sedation and natural airway with oxygen supplementation. Risks discussed include but are not limited to awareness, aspiration, allergic reaction, need for ventilatory assistance up to and including intubation, and cardiopulmonary problems up to and including death. All questions answered and POA fully consents to plan as described.

## 2023-10-15 NOTE — OR NURSING
Pt resting btwn care. Arouses easily to voice is oriented to self and place and is pleasant in conversation.   VSS on RA. Pt denies pain and nausea. Incontinent of urine. Linens changed and perineal care performed.   Pt's  called and updated. Report called to JAYNA Akers and pt transported to T830 on monitor via bed.

## 2023-10-15 NOTE — PROCEDURES
OPERATIVE REPORT    PATIENT:   Anthony Lynn   9/17/1931       PREOPERATIVE DIAGNOSES/INDICATIONS: GI bleeding, gastric outlet obstruction.     POSTOPERATIVE DIAGNOSIS:   Hiatal hernia 3cm in length.   GERD grade B.   Food in stomach, old blood.   Two gastric ulcers, one at the pylorus extending to duodenal bulb, scope could not pass due to very narrow opening around 1-2mm from acute inflammation and chronic deformity. The other ulcer is linear, 3cm in length at the antrum. Both biopsy in the same jar.   Dilatation is not attempt because, the opening is too small and tortuous for scope or guidewire to pass through. Need time to control acute inflammation first.   Duodenum is not see.     PROCEDURE:  ESOPHAGOGASTRODUODENOSCOPY    PHYSICIAN:  Karrie Morales MD. MPH.    ANESTHESIA:  Per anesthesiologist or ICU team.     LOCATION: [facility]    CONSENT:  OBTAINED. The risks, benefits and alternatives of the procedure were discussed in details. The risks include and are not limited to bleeding, infection, perforation, missed lesions, and sedations risks (cardiopulmonary compromise and allergic reaction to medications).    DESCRIPTION: The patient presented to the procedure room.  After routine checkup was performed, patient was brought into the endoscopy suite.  Patient was placed on his left lateral decubitus position. A bite block was placed in patient's mouth. Patient was sedated by anesthesia.  Vital signs were monitored throughout the procedure.  Oxygenation support was provided throughout the procedure. Upper endoscope was inserted into patient's mouth and advanced to the antrum under direct visualization.      Once the site was reached and examined, the upper endoscope was withdrawn.  Retroflexion was made within the stomach.  The stomach was decompressed, scope was withdrawn and the procedure was terminated.     The patient tolerated the procedure well.  There were no immediate complications.    OPERATIVE  FINDINGS:  Hiatal hernia 3cm in length.   GERD grade B.   Food in stomach, old blood.   Two gastric ulcers, one at the pylorus extending to duodenal bulb, scope could not pass due to very narrow opening around 1-2mm from acute inflammation and chronic deformity. The other ulcer is linear, 3cm in length at the antrum. Both biopsy in the same jar.   Dilatation is not attempt because, the opening is too small for scope or guidewire to pass through. Need time to control acute inflammation first.   Duodenum is not see.     RECOMMENDATIONS:  PPI 40mg bid iv for three days in total then bid oral dose for 8 weeks then taper. When oral is resume, please do open capsule method as below.   Stay on clear liquid to today and tmr.   Inpatient GI team will continue to follow up.     When oral PPI is prescribed, please open the capsule and take it with apple sauce to facilitate the healing of marginal ulcer.       ====================================================  Ref:  Opened Proton Pump Inhibitor Capsules Reduce Time to Healing Compared With Intact Capsules for Marginal Ulceration Following Fiorella-en-Y Gastric Bypass    Clin Gastroenterol Hepatol. 2017 Apr;15(4):494-500.e1. doi: 10.1016/j.cgh.2016.10.015.           This note has been transcribed with digital voice recognition software and although it has been reviewed may contain grammatical or word errors

## 2023-10-15 NOTE — ASSESSMENT & PLAN NOTE
Patient has short memory deficit, dementia  Patient is alert and oriented x1-2  Patient is on high risk for delirium

## 2023-10-15 NOTE — CARE PLAN
The patient is Stable - Low risk of patient condition declining or worsening    Shift Goals  Clinical Goals: Monitor H/H  Patient Goals: SEGUN  Family Goals: Rest    Progress made toward(s) clinical / shift goals:      Problem: Knowledge Deficit - Standard  Goal: Patient and family/care givers will demonstrate understanding of plan of care, disease process/condition, diagnostic tests and medications  Description: Target End Date:  1-3 days or as soon as patient condition allows    Document in Patient Education    1.  Patient and family/caregiver oriented to unit, equipment, visitation policy and means for communicating concern  2.  Complete/review Learning Assessment  3.  Assess knowledge level of disease process/condition, treatment plan, diagnostic tests and medications  4.  Explain disease process/condition, treatment plan, diagnostic tests and medications  Outcome: Progressing     Problem: Skin Integrity  Goal: Skin integrity is maintained or improved  Description: Target End Date:  Prior to discharge or change in level of care    Document interventions on Skin Risk/David flowsheet groups and corresponding LDA    1.  Assess and monitor skin integrity, appearance and/or temperature  2.  Assess risk factors for impaired skin integrity and/or pressures ulcers  3.  Implement precautions to protect skin integrity in collaboration with interdisciplinary team  4.  Implement pressure ulcer prevention protocol if at risk for skin breakdown  5.  Confirm wound care consult if at risk for skin breakdown  6.  Ensure patient use of pressure relieving devices  (Low air loss bed, waffle overlay, heel protectors, ROHO cushion, etc)  Outcome: Progressing     Problem: Fall Risk  Goal: Patient will remain free from falls  Description: Target End Date:  Prior to discharge or change in level of care    Document interventions on the Nasrin Cash Fall Risk Assessment    1.  Assess for fall risk factors  2.  Implement fall  precautions  Outcome: Progressing

## 2023-10-16 PROBLEM — F03.90 DEMENTIA (HCC): Status: ACTIVE | Noted: 2021-09-23

## 2023-10-16 NOTE — PROGRESS NOTES
Monitor Summary     Rhythm: SR  Heart Rate: 71-84  Ectopy: R PVC, R Bigeminy, R Couplet, R Trigeminy  Measurement: .17/.15/.47

## 2023-10-16 NOTE — PROGRESS NOTES
Hospital Medicine Daily Progress Note    Date of Service  10/16/2023    Chief Complaint  Anthony Lynn is a 89 y.o. female admitted 10/14/2023 with weakness    Hospital Course:    89-year-old female with history of dementia, hypertension, hypothyroidism and chronic kidney disease with coronary artery disease who presented 10/14 with syncope and fall.  Patient lives with her daughter, patient not able to provide history and according to family, patient has been having generalized weakness with low appetite and losing weight, patient had recurrent falls and came with fall on admission.  On admission EKG showed left bundle branch block and STEMI code was called, cardiology evaluated the patient, discontinued the code and heparin drip, and continue medical management with no intervention needed since patient does not have acute coronary syndrome.  However patient developed also coffee-ground emesis and hemoglobin dropped from 11.5-7.7, GI was consulted, and patient underwent EGD which showed 2 gastric ulcers at pylorus extending to duodenal bulb, scope could not pass due to very narrowing opening around 1-2 mm from acute inflammation and a chronic deformity, patient is on IV PPI twice daily and planning to repeat EGD for dilation           The patient has moderate to advanced dementia, patient has significant cachectic appearance with muscles loss, patient was alert oriented times0-1, plan was discussed with the patient and her family including daughter, answered all their questions.        Interval Problem Update  -Evaluated examined the patient at bedside, patient is alert and oriented around 1-2 around her baseline.  -Continue IV PPI twice daily for 3 days, today is the second day, will follow with the GI for possible repeating scope  -Patient is on clear diet  -Continue monitoring hemoglobin every 12 hours  -Daughter asked for vitamins, start with multivitamins folic and thiamine  -PT and OT and possible home  with home health      I have discussed this patient's plan of care and discharge plan at IDT rounds today with Case Management, Nursing, Nursing leadership, and other members of the IDT team.    Consultants/Specialty  GI    Code Status  DNAR/DNI    Disposition  The patient is not medically cleared for discharge to home or a post-acute facility.  Anticipate discharge to: home with organized home healthcare and close outpatient follow-up    I have placed the appropriate orders for post-discharge needs.    Review of Systems  Review of Systems   Unable to perform ROS: Dementia   Constitutional:  Negative for chills, fever and weight loss.   HENT:  Negative for ear pain, hearing loss and tinnitus.    Eyes:  Negative for blurred vision, double vision and photophobia.   Respiratory:  Negative for cough and hemoptysis.    Cardiovascular:  Negative for chest pain, palpitations, orthopnea and claudication.   Gastrointestinal:  Negative for abdominal pain, constipation, diarrhea, nausea and vomiting.   Genitourinary:  Negative for dysuria, frequency and urgency.   Musculoskeletal:  Negative for myalgias and neck pain.   Skin:  Negative for rash.   Neurological:  Negative for dizziness, speech change and weakness.        Physical Exam  Temp:  [36.3 °C (97.3 °F)-37 °C (98.6 °F)] 36.5 °C (97.7 °F)  Pulse:  [68-88] 74  Resp:  [16-18] 18  BP: (109-197)/(52-79) 177/64  SpO2:  [92 %-97 %] 92 %    Physical Exam  Constitutional:       Appearance: She is not ill-appearing or toxic-appearing.   HENT:      Head: Normocephalic and atraumatic.   Eyes:      General: No scleral icterus.  Cardiovascular:      Rate and Rhythm: Normal rate.      Heart sounds: No murmur heard.  Pulmonary:      Effort: No respiratory distress.      Breath sounds: No wheezing or rales.   Abdominal:      General: There is no distension.      Palpations: Abdomen is soft.      Tenderness: There is no abdominal tenderness.   Skin:     General: Skin is dry.       Coloration: Skin is pale.      Findings: Bruising present. No erythema, lesion or rash.   Neurological:      Mental Status: Mental status is at baseline. She is disoriented.      Cranial Nerves: No cranial nerve deficit.         Fluids  No intake or output data in the 24 hours ending 10/16/23 1524      Laboratory  Recent Labs     10/14/23  0429 10/14/23  0823 10/15/23  0251 10/15/23  1051 10/15/23  1724 10/16/23  0136 10/16/23  0916   WBC 11.3*  --  6.2  --   --   --   --    RBC 2.82*  --  2.47*  --   --   --   --    HEMOGLOBIN 8.2*   < > 7.3*   < > 7.6* 8.3* 7.7*   HEMATOCRIT 26.4*   < > 22.4*   < > 23.5* 25.9* 24.4*   MCV 93.6  --  90.7  --   --   --   --    MCH 29.1  --  29.6  --   --   --   --    MCHC 31.1*  --  32.6  --   --   --   --    RDW 52.8*  --  51.0*  --   --   --   --    PLATELETCT 333  --  245  --   --   --   --    MPV 10.3  --  10.4  --   --   --   --     < > = values in this interval not displayed.     Recent Labs     10/14/23  0429 10/15/23  0251 10/16/23  0136   SODIUM 139 134* 142   POTASSIUM 3.6 3.6 3.1*   CHLORIDE 96 102 110   CO2 31 23 21   GLUCOSE 147* 87 89   BUN 46* 38* 24*   CREATININE 1.41* 1.23 1.10   CALCIUM 9.1 7.7* 8.1*     Recent Labs     10/14/23  0429   APTT 25.7   INR 1.04               Imaging  EC-ECHOCARDIOGRAM COMPLETE W/O CONT   Final Result      CTA ABDOMEN PELVIS W & W/O POST PROCESS   Final Result         1. No evidence of active GI bleeding.   2. Gastric distention, which could be secondary to gastric atony/ileus or partial outlet obstruction.      CT-HEAD W/O   Final Result      No acute process.         DX-CHEST-PORTABLE (1 VIEW)   Final Result      No acute process.      DX-PELVIS-1 OR 2 VIEWS   Final Result      Normal exam.           Assessment/Plan  * GIB (gastrointestinal bleeding) with acute on chronic blood loss anemia- (present on admission)  Assessment & Plan  Came with hematemesis and coffee-ground  Patient was found to have anemia  EGD was done on 10/15 and  showed 2 gastric ulcers with the stenosis  Planning to repeat EGD for dilation after decreasing the inflammation  Continue PPI twice daily IV for 3 days  GI on board, appreciate recommendations      Dementia ([facility])- (present on admission)  Assessment & Plan  Patient has short memory deficit, dementia  Patient is alert and oriented x1-2  Patient is on high risk for delirium    Severe protein-calorie malnutrition ([facility])  Assessment & Plan  Dietitian consult    PAD (peripheral artery disease) ([facility])- (present on admission)  Assessment & Plan  Holding blood thinner due to GI bleeding  Continue lisinopril and consider statin, patient has significant malnutrition with muscles loss    Major depressive disorder with single episode, in full remission ([facility])- (present on admission)  Assessment & Plan  Continue duloxetine    Other specified hypothyroidism- (present on admission)  Assessment & Plan  Resume levothyroxine  TSH 5.5    Stage 3b chronic kidney disease ([facility])- (present on admission)  Assessment & Plan  Around her baseline with creatinine 1.2  avoid nephrotoxins    GERD (gastroesophageal reflux disease)- (present on admission)  Assessment & Plan  IV PPI twice daily    Primary hypertension- (present on admission)  Assessment & Plan  Continue lisinopril  Avoid aggressive since patient has anemia         VTE prophylaxis:   SCDs/TEDs   pharmacologic prophylaxis contraindicated due to GI bleeding      I have performed a physical exam and reviewed and updated ROS and Plan today (10/16/2023). In review of yesterday's note (10/15/2023), there are no changes except as documented above.    Greater than 51 minutes spent prepping to see patient (e.g. review of tests) obtaining and/or reviewing separately obtained history. Performing a medically appropriate examination and/ evaluation.  Counseling and educating the patient/family/caregiver.  Ordering medications, tests, or procedures.  Referring and communicating with other health care  professionals.  Documenting clinical information in EPIC.  Independently interpreting results and communicating results to patient/family/caregiver.  Care coordination

## 2023-10-16 NOTE — DISCHARGE PLANNING
HTH/SCP TCN chart review completed. Collaborated with NICHOLE Macdonald prior to meeting with the pt. The most current review of medical record, knowledge of pt's PLOF and social support, LACE+ score of 68, 6 clicks scores of 12 ADL's and 12 mobility were considered.  Per chart review, cardiology has signed off.     Pt seen at bedside. Introduced TCN program. Provided education regarding post acute levels of care. Discussed HTH/SCP plan benefits (Meds to Beds, medical uber and [facility] transitional care). Pt verbalizes understanding.     Patient states she lives in a one level home with her spouse and stepsister and was supervised/modified independent with ADL's and mobility (no AD).  She ambulates with the W/C at times.  She has a  2 X's a week and family provides transportation as needed.  She has a SPC, 4WW, W/C, shower chair, and commode.  She is on RA at her baseline and is currently on 2 L/min O2.  Daughter states she is not at her baseline level of function.  Family is agreeable to Home with Home Health services and [facility] but REFUSES any post-acute placement including SNF placement.     This TCN made referral for [facility].  Per collaboration with CM, CM reached out to RN for PT/OT consults.  Choice proactively obtained for HH ([facility]), faxed to DPA and given to NICHOLE.  TCN will continue to follow and collaborate with discharge planning team as additional post acute needs arise. Thank you.     Completed today:  Awaiting PT/OT recommendations.    Choice obtained: HH ([facility]) on 10/16/23.  Referral to [facility] made on 10/16/23.    SCP with [facility] PCP.

## 2023-10-16 NOTE — CARE PLAN
The patient is Stable - Low risk of patient condition declining or worsening    Shift Goals  Clinical Goals: monitor VS, monitor H&H  Patient Goals: Increase mobility  Family Goals: Rest    Progress made toward(s) clinical / shift goals:    Problem: Skin Integrity  Goal: Skin integrity is maintained or improved  Description: Target End Date:  Prior to discharge or change in level of care    Document interventions on Skin Risk/David flowsheet groups and corresponding LDA    1.  Assess and monitor skin integrity, appearance and/or temperature  2.  Assess risk factors for impaired skin integrity and/or pressures ulcers  3.  Implement precautions to protect skin integrity in collaboration with interdisciplinary team  4.  Implement pressure ulcer prevention protocol if at risk for skin breakdown  5.  Confirm wound care consult if at risk for skin breakdown  6.  Ensure patient use of pressure relieving devices  (Low air loss bed, waffle overlay, heel protectors, ROHO cushion, etc)  Outcome: Progressing  Note: Pt is able to turn self from side to side, standby assist with turning. Pt is on a waffle overlay, with barrier wipes and barrier cream at bedside. Pt is on a TAPS system. Skin is fully intact, no respiratory devices in place.     Problem: Fall Risk  Goal: Patient will remain free from falls  Description: Target End Date:  Prior to discharge or change in level of care    Document interventions on the Alexandra Shailesh Fall Risk Assessment    1.  Assess for fall risk factors  2.  Implement fall precautions  Outcome: Progressing  Note: Pt is a high fall risk. Bed is in lowest, locked position, bed rails x3 in place. Family  at beside. Pt and family educated to utilize call light if pt wishes to sit edge of bed or needs to go to the bathroom. Call light and belongings within reach.

## 2023-10-16 NOTE — PROGRESS NOTES
Gastroenterology Progress Note               Author:  LOGAN Weathers Date & Time Created: 10/16/2023 2:31 PM       Patient ID:  Name:             Anthony Lynn  YOB: 1931  Age:                 89 y.o.  female  MRN:               [id number]        Medical Decision Making, by Problem:  Active Hospital Problems    Diagnosis     Syncope [R55]     GIB (gastrointestinal bleeding) with acute on chronic blood loss anemia [K92.2]     Severe protein-calorie malnutrition (HCC) [E43]     Major depressive disorder with single episode, in full remission (HCC) [F32.5]     PAD (peripheral artery disease) ([facility]) [I73.9]     Cognitive disorder [F09]     Other specified hypothyroidism [E03.8]     Stage 3b chronic kidney disease (HCC) [N18.32]     GERD (gastroesophageal reflux disease) [K21.9]     Primary hypertension [I10]            Presenting Chief Complaint:  Hematemesis     HISTORY OF PRESENT ILLNESS:  Anthony Lynn is a 89 y.o. female with past medical history of dementia,  hypertension, GERD, CKD stage III, CAD who presented 10/14/2023 after a syncopal episode.n EKG showed LBBB not significantly changed from prior EKG in 2018.  Cardiology consulted and ruled out STEMI. Heparin was started before cardiology evaluated but it has been stopped.  When I went to see patient she had been sedated for CT scan and she did not participate in history. Her daughter was bedside and that is where interview came from. She states that they have been wanting her to see GI for 6 months. She has been complaining of pain in her abdomen and eating less and less over time. She only drinks ensure now. She has lost a lot of weight  She was taking Excedrin and Motrin for pain. She takes a lot. denies taking other NSAID.  No prior history of GI bleed or endoscopy. She also has been complaining of dizziness and has had frequent falls.  Intermittently she has been having vomiting with coffee-ground emesis and having black  stools.  She did have vomiting with coffee-ground in ER as well.  Her hemoglobin 8.2 down from 11.5 on 11/30/2022.  CTA of the abdomen negative for active bleeding. There is gastric distention.          Interval History:  10/16/2023: Patient seen at bedside.  Patient postprocedure day #1 s/p EGD.  Patient sleeping in bed, mildly arousable but quickly falls back asleep.  Sitter at bedside reports patient had been sleeping and not drinking much this morning.  Hemoglobin stable.  BUN decreasing from 46-38-24.        Hospital Medications:  Current Facility-Administered Medications   Medication Dose Frequency Provider Last Rate Last Admin    senna-docusate (Pericolace Or Senokot S) 8.6-50 MG per tablet 2 Tablet  2 Tablet BID Ryan Holland M.D.   2 Tablet at 10/16/23 0516    And    polyethylene glycol/lytes (Miralax) PACKET 1 Packet  1 Packet QDAY PRN Ryan Holland M.D.        And    magnesium hydroxide (Milk Of Magnesia) suspension 30 mL  30 mL QDAY PRN Ryan Holland M.D.        And    bisacodyl (Dulcolax) suppository 10 mg  10 mg QDAY PRN Ryan Holland M.D.        acetaminophen (Tylenol) tablet 650 mg  650 mg Q6HRS PRN Ryan Holland M.D.   650 mg at 10/14/23 1233    Pharmacy Consult Request ...Pain Management Review 1 Each  1 Each PHARMACY TO DOSE Ryan Holland M.D.        oxyCODONE immediate-release (Roxicodone) tablet 2.5 mg  2.5 mg Q3HRS PRN Ryan Holland M.D.        Or    oxyCODONE immediate-release (Roxicodone) tablet 5 mg  5 mg Q3HRS PRN Ryan Holland M.D.        Or    morphine 4 MG/ML injection 2 mg  2 mg Q3HRS PRN Ryan Holland M.D.        labetalol (Normodyne/Trandate) injection 10 mg  10 mg Q4HRS PRN Ryan Holland M.D.   10 mg at 10/16/23 0502    pantoprazole (Protonix) injection 40 mg  40 mg BID Ryan Holland M.D.   40 mg at 10/16/23 0515    DULoxetine (Cymbalta) capsule 30 mg  30 mg Q EVENING Ryan Holland M.D.   30 mg at 10/15/23 1644    DULoxetine (Cymbalta)  "capsule 60 mg  60 mg QAM Ryan Holland M.D.   60 mg at 10/16/23 0515    levothyroxine (Synthroid) tablet 50 mcg  50 mcg AM ES Ryan Holland M.D.   50 mcg at 10/16/23 0515    lisinopril (Prinivil) tablet 10 mg  10 mg BID Ryan Holland M.D.   10 mg at 10/16/23 0515    tizanidine (Zanaflex) tablet 2 mg  2 mg QHS PRN Ryan Holland M.D.        NS infusion   Continuous Hussain Currie M.D. 75 mL/hr at 10/16/23 0534 New Bag at 10/16/23 0534   Last reviewed on 10/15/2023  9:00 AM by Valentine Kim R.N.       Review of Systems:  Review of Systems   Unable to perform ROS: Medical condition         Vital signs:  Weight/BMI: Body mass index is 21.46 kg/m².  BP (!) 177/64   Pulse 74   Temp 36.5 °C (97.7 °F) (Temporal)   Resp 18   Ht 1.499 m (4' 11\")   Wt 48.2 kg (106 lb 4.2 oz)   SpO2 92%   Vitals:    10/16/23 0022 10/16/23 0435 10/16/23 0515 10/16/23 0803   BP: (!) 180/79 (!) 197/79 (!) 162/61 (!) 177/64   Pulse: 88 76  74   Resp: 16 16  18   Temp: 37 °C (98.6 °F) 36.3 °C (97.3 °F)  36.5 °C (97.7 °F)   TempSrc: Temporal Temporal  Temporal   SpO2: 97% 95%  92%   Weight:  48.2 kg (106 lb 4.2 oz)     Height:         Oxygen Therapy:  Pulse Oximetry: 92 %, O2 (LPM): 0, O2 Delivery Device: None - Room Air  No intake or output data in the 24 hours ending 10/16/23 1431      Physical Exam:  Physical Exam  Vitals and nursing note reviewed.   Constitutional:       General: She is sleeping.      Appearance: She is cachectic. She is ill-appearing (chronically ill appearing).   HENT:      Head: Normocephalic.      Nose: Nose normal. No congestion.      Mouth/Throat:      Mouth: Mucous membranes are moist.      Pharynx: Oropharynx is clear. No oropharyngeal exudate.   Eyes:      General: No scleral icterus.     Conjunctiva/sclera: Conjunctivae normal.   Cardiovascular:      Rate and Rhythm: Normal rate and regular rhythm.      Pulses: Normal pulses.      Heart sounds: Normal heart sounds. No murmur " heard.  Pulmonary:      Effort: Pulmonary effort is normal. No respiratory distress.      Breath sounds: Normal breath sounds.   Abdominal:      General: Abdomen is flat. Bowel sounds are normal. There is no distension.      Palpations: Abdomen is soft.      Tenderness: There is no abdominal tenderness. There is no guarding.   Musculoskeletal:      Right lower leg: No edema.      Left lower leg: No edema.   Skin:     General: Skin is warm and dry.      Capillary Refill: Capillary refill takes less than 2 seconds.      Coloration: Skin is pale.   Neurological:      Mental Status: She is easily aroused.   Psychiatric:      Comments: Unable to assess             Labs:  Recent Labs     10/14/23  0429 10/15/23  0251 10/16/23  0136   SODIUM 139 134* 142   POTASSIUM 3.6 3.6 3.1*   CHLORIDE 96 102 110   CO2 31 23 21   BUN 46* 38* 24*   CREATININE 1.41* 1.23 1.10   MAGNESIUM 2.2  --  2.0   PHOSPHORUS 3.3  --   --    CALCIUM 9.1 7.7* 8.1*     Recent Labs     10/14/23  0429 10/15/23  0251 10/16/23  0136   ALTSGPT <5 5  --    ASTSGOT 13 11*  --    ALKPHOSPHAT 51 39  --    TBILIRUBIN <0.2 0.2  --    LIPASE 25  --   --    GLUCOSE 147* 87 89     Recent Labs     10/14/23  0429 10/15/23  0251   WBC 11.3* 6.2   NEUTSPOLYS 58.20 51.00   LYMPHOCYTES 31.70 35.80   MONOCYTES 8.80 11.10   EOSINOPHILS 0.70 1.50   BASOPHILS 0.30 0.30   ASTSGOT 13 11*   ALTSGPT <5 5   ALKPHOSPHAT 51 39   TBILIRUBIN <0.2 0.2     Recent Labs     10/14/23  0429 10/14/23  0823 10/15/23  0251 10/15/23  1051 10/15/23  1724 10/16/23  0136 10/16/23  0916   RBC 2.82*  --  2.47*  --   --   --   --    HEMOGLOBIN 8.2*   < > 7.3*   < > 7.6* 8.3* 7.7*   HEMATOCRIT 26.4*   < > 22.4*   < > 23.5* 25.9* 24.4*   PLATELETCT 333  --  245  --   --   --   --    PROTHROMBTM 13.7  --   --   --   --   --   --    APTT 25.7  --   --   --   --   --   --    INR 1.04  --   --   --   --   --   --     < > = values in this interval not displayed.     Recent Results (from the past 24 hour(s))    HEMOGLOBIN AND HEMATOCRIT    Collection Time: 10/15/23  5:24 PM   Result Value Ref Range    Hemoglobin 7.6 (L) 12.0 - 16.0 g/dL    Hematocrit 23.5 (L) 37.0 - 47.0 %   HEMOGLOBIN AND HEMATOCRIT    Collection Time: 10/16/23  1:36 AM   Result Value Ref Range    Hemoglobin 8.3 (L) 12.0 - 16.0 g/dL    Hematocrit 25.9 (L) 37.0 - 47.0 %   Basic Metabolic Panel    Collection Time: 10/16/23  1:36 AM   Result Value Ref Range    Sodium 142 135 - 145 mmol/L    Potassium 3.1 (L) 3.6 - 5.5 mmol/L    Chloride 110 96 - 112 mmol/L    Co2 21 20 - 33 mmol/L    Glucose 89 65 - 99 mg/dL    Bun 24 (H) 8 - 22 mg/dL    Creatinine 1.10 0.50 - 1.40 mg/dL    Calcium 8.1 (L) 8.5 - 10.5 mg/dL    Anion Gap 11.0 7.0 - 16.0   LACTIC ACID    Collection Time: 10/16/23  1:36 AM   Result Value Ref Range    Lactic Acid 1.0 0.5 - 2.0 mmol/L   ESTIMATED GFR    Collection Time: 10/16/23  1:36 AM   Result Value Ref Range    GFR (CKD-EPI) 47 (A) >60 mL/min/1.73 m 2   MAGNESIUM    Collection Time: 10/16/23  1:36 AM   Result Value Ref Range    Magnesium 2.0 1.5 - 2.5 mg/dL   HEMOGLOBIN AND HEMATOCRIT    Collection Time: 10/16/23  9:16 AM   Result Value Ref Range    Hemoglobin 7.7 (L) 12.0 - 16.0 g/dL    Hematocrit 24.4 (L) 37.0 - 47.0 %       Radiology Review:  EC-ECHOCARDIOGRAM COMPLETE W/O CONT   Final Result      CTA ABDOMEN PELVIS W & W/O POST PROCESS   Final Result         1. No evidence of active GI bleeding.   2. Gastric distention, which could be secondary to gastric atony/ileus or partial outlet obstruction.      CT-HEAD W/O   Final Result      No acute process.         DX-CHEST-PORTABLE (1 VIEW)   Final Result      No acute process.      DX-PELVIS-1 OR 2 VIEWS   Final Result      Normal exam.            MDM (Data Review):   -Records reviewed and summarized in current documentation  -I personally reviewed and interpreted the laboratory results  -I personally reviewed the radiology images    Assessment/Recommendations:  Gastric outlet  obstruction  Hiatal hernia  GERD grade B  Gastric ulcers  Upper GI bleeding  Anemia secondary to GI bleed  Severe protein calorie malnutrition  Dementia  Hypertension  Hypothyroidism  Epigastric pain  Weight loss  Early satiety and anorexia  Abnormal ct scan of the stomach with gastric distension  Renal insufficiency  CAD     MDM:  This is a 89-year-old female with a past medical history as listed above who presented to [facility] 10/14/2023 with hematemesis and melena.  She was receiving PRBCs for stabilization and underwent EGD on 10/15/2023 during which time 3 cm hiatal hernia was seen in addition to GERD grade B, food in stomach with old blood, 2 gastric ulcers 1 at the pylorus extending to duodenal bulb and the other was a linear ulcer 3 cm in length at the antrum of the stomach.  Additionally, patient was found to have gastric outlet obstruction as the scope was not able to safely pass through the narrow opening of about 1-2 mm due to acute inflammation and chronic deformity.  Patient will need time for acute inflammation to subside.  Prior to discharge, may consider repeat EGD to attempt dilation or she may have this done on outpatient basis.  Biopsies were obtained and are pending.  Postprocedurally, patient sleeping quietly, slightly arouses with tactile stimulation but quickly returns to sleep and is noninteractive.  Hemoglobin stable postprocedurally and BUN downtrending.       Plan:  PPI 40 mg twice daily IV x3 days and total then twice daily oral dose x8 weeks, then taper.  Upon initiation of oral medication, please open the capsule and take it with apple sauce to facilitate the healing of marginal ulcer.   Continue clear liquid diet  Palliative care and dietary involved  GI to follow biopsies        Core Quality Measures   Reviewed items::  Labs, Medications and Radiology reports reviewed

## 2023-10-16 NOTE — CARE PLAN
Problem: Knowledge Deficit - Standard  Goal: Patient and family/care givers will demonstrate understanding of plan of care, disease process/condition, diagnostic tests and medications  Outcome: Not Met     Problem: Skin Integrity  Goal: Skin integrity is maintained or improved  Outcome: Progressing     Problem: Fall Risk  Goal: Patient will remain free from falls  Outcome: Progressing   The patient is Stable - Low risk of patient condition declining or worsening    Shift Goals  Clinical Goals: safety  Patient Goals: nathanael  Family Goals: nutrition    Progress made toward(s) clinical / shift goals:       Patient is not progressing towards the following goals:      Problem: Knowledge Deficit - Standard  Goal: Patient and family/care givers will demonstrate understanding of plan of care, disease process/condition, diagnostic tests and medications  Outcome: Not Met

## 2023-10-16 NOTE — DISCHARGE PLANNING
Case Management Discharge Planning    Admission Date: 10/14/2023  GMLOS: 4.6  ALOS: 2    6-Clicks ADL Score: 12  6-Clicks Mobility Score: 12  PT and/or OT Eval ordered: Yes  Post-acute Referrals Ordered: Yes  Post-acute Choice Obtained: Yes  Has referral(s) been sent to post-acute provider:  Yes      Anticipated Discharge Dispo: Discharge Disposition: Discharged to home/self care (01)    DME Needed: No    Action(s) Taken: DC Assessment Complete (See below) and Choice obtained    Escalations Completed: None    Medically Clear: No    Next Steps: collaborated with SHRADDHA Negron. Report reviewed. Enhanced HH reviewing for acceptance. Family does not want any SNF. States everything is equipped at home. Insurance is helping them with Grab bars. Daughter concerned about malnutrition-discussed with .  Spouse and daughter agree to enhanced HH if accepted. CM following for d/c needs.     Barriers to Discharge: Medical clearance and Pending PT Evaluation    Is the patient up for discharge tomorrow: No      Care Transition Team Assessment   role explained to daughter. Demographics verified. 89 y.o. female PMH: peripheral vascular disease, hypertension, hypothyroidism, GERD, CKD stage III, CAD, MI,   presented after a syncopal episode, with possible STEMI.  EKG showed LBBB not significantly changed from prior EKG in 2018.  Cardiology consulted and ruled out STEMI. DX: GIB, severe protein anemia hypothyroidism, Stage 3b chronic kidney disease. GERD,HTN.       Information Source  Orientation Level: Oriented X4, Oriented to person, Disoriented to place, Disoriented to time, Disoriented to situation  Information Given By: Relative (daughter and SHRADDHA Negrno)  Informant's Name: Tamara/Migdalia  Who is responsible for making decisions for patient? : Adult child  Name(s) of Primary Decision Maker: Tamara    Readmission Evaluation  Is this a readmission?: No    Elopement Risk  Legal Hold: No  Ambulatory or Self Mobile in  Wheelchair: No-Not an Elopement Risk  Disoriented: Place-At Risk for Elopement, Time-At Risk for Elopement, Situation-At Risk for Elopement  Psychiatric Symptoms: Impulsivity-at Risk for Elopement  History of Wandering: No  Elopement this Admit: No  Vocalizing Wanting to Leave: No  Displays Behaviors, Body Language Wanting to Leave: No-Not at Risk for Elopement  Elopement Risk: Not at Risk for Elopement  Wanderguard On: Yes    Interdisciplinary Discharge Planning  Primary Care Physician: Lili Ayala  Lives with - Patient's Self Care Capacity: Spouse, Adult Children  Patient or legal guardian wants to designate a caregiver: Yes  Caregiver name: Tamara Talbert daughter  Caregiver contact info: [phone]  Support Systems: Spouse / Significant Other, Family Member(s)  Housing / Facility: [address] [place] [address]  Do You Take your Prescribed Medications Regularly: Yes  Able to Return to Previous ADL's: Yes  Mobility Issues: Yes  Prior Services: Housekeeping / Homemaker Services  Patient Prefers to be Discharged to:: home  Assistance Needed: Yes  Durable Medical Equipment: Walker    Discharge Preparedness  What is your plan after discharge?: Home with help  What are your discharge supports?: Spouse, Child  Prior Functional Level: Needs Assist with Activities of Daily Living, Ambulatory, Uses Walker (spouse step daughter help. House keeper two x a week.)  Difficulity with ADLs: Walking, Dressing, Bathing  Difficulity with IADLs: Cooking, Driving, Laundry, Shopping, Other (daughter helps. Patient cooks with supervision.)    Functional Assesment  Prior Functional Level: Needs Assist with Activities of Daily Living, Ambulatory, Uses Walker (spouse step daughter help. House keeper two x a week.)    Finances  Financial Barriers to Discharge: No  Prescription Coverage: Yes    Vision / Hearing Impairment  Vision Impairment : No  Hearing Impairment : Yes  Hearing Impairment: Patient Declines to Wear Hearing Device(s), Both Ears  Does Pt  Need Special Equipment for the Hearing Impaired?: No     Advance Directive  Advance Directive?: DPOA for Health Care  Durable Power of  Name and Contact : Keith    Domestic Abuse  Have you ever been the victim of abuse or violence?: No  Physical Abuse or Sexual Abuse: No  Verbal Abuse or Emotional Abuse: No  Possible Abuse/Neglect Reported to:: Not Applicable    Psychological Assessment  History of Substance Abuse: None  History of Psychiatric Problems: No  Non-compliant with Treatment: No  Newly Diagnosed Illness: No    Discharge Risks or Barriers  Discharge risks or barriers?: Post-acute placement / services  Patient risk factors: Vulnerable adult    Anticipated Discharge Information  Discharge Disposition: Discharged to home/self care (01)

## 2023-10-16 NOTE — PROGRESS NOTES
- S/P morphine PCA but stopped after requiring narcan  - PRN tramadol  - PRN Robaxin QID for back spasms  - S/P gabapentin per patient request; switched to daily 4/1 > decreased to 100 mg 04/12 >> discontinued 04/13      RESOLVED>    Bedside report received from day RN, pt care assumed, assessment completed. Pt is A&Ox2 to self and place, SR on the monitor. Updated on POC, questions answered. Bed in lowest, locked position, treaded socks on, call light and belongings within reach.

## 2023-10-16 NOTE — DOCUMENTATION QUERY
[facility]                                                                       Query Response Note      PATIENT:               KEREN HUFF  ACCT #:                  [id number]  MRN:                     [id number]  :                      1931  ADMIT DATE:       10/14/2023 4:25 AM  DISCH DATE:          RESPONDING  PROVIDER #:        [id number]           QUERY TEXT:    Advanced dementia is documented in the Medical Record. Can this diagnosis be further specified.    The patient's Clinical Indicators include:  Findings: 10/14 ED: altered mental status Patient with some agitation in CT not wanting to lay flat, 0.5 mg of Ativan ordered after unsuccessful and redirecting.    Treatment: redirecting, ativan     Risk Factors: history of advanced dementia     Gisela Bejarano RN BSN  Clinical Documentation   [email or web address]  Connect via Voalte Messenger  Options provided:   -- Dementia with behavioral disturbance   -- Dementia without behvioral disturbance   -- Other explanation, (please specify other explanation)      Query created by: Gisela Birch on 10/16/2023 7:42 AM    RESPONSE TEXT:    Dementia without behvioral disturbance          Electronically signed by:  MADHAV REDDY MD 10/16/2023 7:52 AM

## 2023-10-17 NOTE — PROGRESS NOTES
Gastroenterology Progress Note               Author:  LOGAN Weathers Date & Time Created: 10/17/2023 10:34 AM       Patient ID:  Name:             Anthony Lynn  YOB: 1931  Age:                 89 y.o.  female  MRN:               [id number]        Medical Decision Making, by Problem:  Active Hospital Problems    Diagnosis     Syncope [R55]     GIB (gastrointestinal bleeding) with acute on chronic blood loss anemia [K92.2]     Severe protein-calorie malnutrition (HCC) [E43]     Major depressive disorder with single episode, in full remission (HCC) [F32.5]     PAD (peripheral artery disease) ([facility]) [I73.9]     Dementia ([facility]) [F03.90]     Other specified hypothyroidism [E03.8]     Stage 3b chronic kidney disease (HCC) [N18.32]     GERD (gastroesophageal reflux disease) [K21.9]     Primary hypertension [I10]            Presenting Chief Complaint:  Hematemesis     HISTORY OF PRESENT ILLNESS:  Anthony Lynn is a 89 y.o. female with past medical history of dementia,  hypertension, GERD, CKD stage III, CAD who presented 10/14/2023 after a syncopal episode.n EKG showed LBBB not significantly changed from prior EKG in 2018.  Cardiology consulted and ruled out STEMI. Heparin was started before cardiology evaluated but it has been stopped.  When I went to see patient she had been sedated for CT scan and she did not participate in history. Her daughter was bedside and that is where interview came from. She states that they have been wanting her to see GI for 6 months. She has been complaining of pain in her abdomen and eating less and less over time. She only drinks ensure now. She has lost a lot of weight  She was taking Excedrin and Motrin for pain. She takes a lot. denies taking other NSAID.  No prior history of GI bleed or endoscopy. She also has been complaining of dizziness and has had frequent falls.  Intermittently she has been having vomiting with coffee-ground emesis and having black  stools.  She did have vomiting with coffee-ground in ER as well.  Her hemoglobin 8.2 down from 11.5 on 11/30/2022.  CTA of the abdomen negative for active bleeding. There is gastric distention.          Interval History:  10/16/2023: Patient seen at bedside.  Patient postprocedure day #1 s/p EGD.  Patient sleeping in bed, mildly arousable but quickly falls back asleep.  Sitter at bedside reports patient had been sleeping and not drinking much this morning.  Hemoglobin stable.  BUN decreasing from 46-38-24.    10/17/2023: patient seen at bedside with daughter. Awake, alert, oriented x2. Tolerating clear liquids. Hgb stable. BUN improved. One dark BM overnight.     Hospital Medications:  Current Facility-Administered Medications   Medication Dose Frequency Provider Last Rate Last Admin    0.9 % NaCl with KCl 40 mEq 1,000 mL   Continuous Glenys Hart M.D. 75 mL/hr at 10/17/23 1010 New Bag at 10/17/23 1010    therapeutic multivitamin-minerals (Theragran-M) tablet 1 Tablet  1 Tablet DAILY Hussain Currie M.D.   1 Tablet at 10/17/23 0444    thiamine (Vitamin B-1) tablet 100 mg  100 mg DAILY Hussain Currie M.D.   100 mg at 10/17/23 0444    folic acid (Folvite) tablet 1 mg  1 mg DAILY Hussain Currie M.D.   1 mg at 10/17/23 0444    QUEtiapine (SEROquel) tablet 12.5 mg  12.5 mg HS PRN Hussain Currie M.D.   12.5 mg at 10/16/23 2009    senna-docusate (Pericolace Or Senokot S) 8.6-50 MG per tablet 2 Tablet  2 Tablet BID Ryan Holland M.D.   2 Tablet at 10/17/23 0444    And    polyethylene glycol/lytes (Miralax) PACKET 1 Packet  1 Packet QDAY PRN Ryan Holland M.D.        And    magnesium hydroxide (Milk Of Magnesia) suspension 30 mL  30 mL QDAY PRN Ryan Holland M.D.        And    bisacodyl (Dulcolax) suppository 10 mg  10 mg QDAY PRN Ryan Holland M.D.        acetaminophen (Tylenol) tablet 650 mg  650 mg Q6HRS PRN Ryan Holland M.D.   650 mg at 10/14/23 1233    Pharmacy Consult Request  "...Pain Management Review 1 Each  1 Each PHARMACY TO DOSE Ryan Holland M.D.        oxyCODONE immediate-release (Roxicodone) tablet 2.5 mg  2.5 mg Q3HRS PRN Ryan Holland M.D.        Or    oxyCODONE immediate-release (Roxicodone) tablet 5 mg  5 mg Q3HRS PRN Ryan Holland M.D.        Or    morphine 4 MG/ML injection 2 mg  2 mg Q3HRS PRN Ryan Holland M.D.        labetalol (Normodyne/Trandate) injection 10 mg  10 mg Q4HRS PRN Ryan Holland M.D.   10 mg at 10/17/23 0750    pantoprazole (Protonix) injection 40 mg  40 mg BID Hussain Currie M.D.   40 mg at 10/17/23 0443    DULoxetine (Cymbalta) capsule 30 mg  30 mg Q EVENING Ryan Holland M.D.   30 mg at 10/16/23 1757    DULoxetine (Cymbalta) capsule 60 mg  60 mg QAM Ryan Holland M.D.   60 mg at 10/17/23 0450    levothyroxine (Synthroid) tablet 50 mcg  50 mcg AM ES Ryan Holland M.D.   50 mcg at 10/17/23 0444    lisinopril (Prinivil) tablet 10 mg  10 mg BID Ryan Holland M.D.   10 mg at 10/17/23 0448    tizanidine (Zanaflex) tablet 2 mg  2 mg QHS PRN Ryan Holland M.D.       Last reviewed on 10/15/2023  9:00 AM by Valentine Kim R.N.       Review of Systems:  Review of Systems   Unable to perform ROS: Dementia         Vital signs:  Weight/BMI: Body mass index is 21.46 kg/m².  /67   Pulse 62   Temp 37.1 °C (98.8 °F) (Temporal)   Resp 16   Ht 1.499 m (4' 11\")   Wt 48.2 kg (106 lb 4.2 oz)   SpO2 96%   Vitals:    10/17/23 0000 10/17/23 0400 10/17/23 0700 10/17/23 0806   BP: (!) 157/73 (!) 145/56 (!) 184/86 138/67   Pulse:  64 72 62   Resp:  17 16 16   Temp:  36.8 °C (98.2 °F) 37.1 °C (98.8 °F)    TempSrc:  Temporal Temporal    SpO2: 96% 97% 96% 96%   Weight:       Height:         Oxygen Therapy:  Pulse Oximetry: 96 %, O2 (LPM): 0, O2 Delivery Device: None - Room Air    Intake/Output Summary (Last 24 hours) at 10/17/2023 1034  Last data filed at 10/17/2023 0800  Gross per 24 hour   Intake 50 ml   Output 900 ml   Net " -850 ml         Physical Exam:  Physical Exam  Vitals and nursing note reviewed.   Constitutional:       General: She is sleeping.      Appearance: She is cachectic. She is ill-appearing (chronically ill appearing).   HENT:      Head: Normocephalic.      Nose: Nose normal. No congestion.      Mouth/Throat:      Mouth: Mucous membranes are moist.      Pharynx: Oropharynx is clear. No oropharyngeal exudate.   Eyes:      General: No scleral icterus.     Conjunctiva/sclera: Conjunctivae normal.   Cardiovascular:      Rate and Rhythm: Normal rate and regular rhythm.      Pulses: Normal pulses.      Heart sounds: Normal heart sounds. No murmur heard.  Pulmonary:      Effort: Pulmonary effort is normal. No respiratory distress.      Breath sounds: Normal breath sounds.   Abdominal:      General: Abdomen is flat. Bowel sounds are normal. There is no distension.      Palpations: Abdomen is soft.      Tenderness: There is no abdominal tenderness. There is no guarding.   Musculoskeletal:      Right lower leg: No edema.      Left lower leg: No edema.   Skin:     General: Skin is warm and dry.      Capillary Refill: Capillary refill takes less than 2 seconds.      Coloration: Skin is pale.   Neurological:      Mental Status: She is easily aroused.      Comments: Aaox2   Psychiatric:         Mood and Affect: Mood normal.         Behavior: Behavior normal.             Labs:  Recent Labs     10/15/23  0251 10/16/23  0136   SODIUM 134* 142   POTASSIUM 3.6 3.1*   CHLORIDE 102 110   CO2 23 21   BUN 38* 24*   CREATININE 1.23 1.10   MAGNESIUM  --  2.0   CALCIUM 7.7* 8.1*       Recent Labs     10/15/23  0251 10/16/23  0136   ALTSGPT 5  --    ASTSGOT 11*  --    ALKPHOSPHAT 39  --    TBILIRUBIN 0.2  --    GLUCOSE 87 89       Recent Labs     10/15/23  0251   WBC 6.2   NEUTSPOLYS 51.00   LYMPHOCYTES 35.80   MONOCYTES 11.10   EOSINOPHILS 1.50   BASOPHILS 0.30   ASTSGOT 11*   ALTSGPT 5   ALKPHOSPHAT 39   TBILIRUBIN 0.2       Recent Labs      10/15/23  0251 10/15/23  1051 10/16/23  1750 10/17/23  0232 10/17/23  1014   RBC 2.47*  --   --   --   --    HEMOGLOBIN 7.3*   < > 7.3* 7.7* 7.5*   HEMATOCRIT 22.4*   < > 22.7* 24.0* 23.5*   PLATELETCT 245  --   --   --   --     < > = values in this interval not displayed.       Recent Results (from the past 24 hour(s))   HEMOGLOBIN AND HEMATOCRIT    Collection Time: 10/16/23  5:50 PM   Result Value Ref Range    Hemoglobin 7.3 (L) 12.0 - 16.0 g/dL    Hematocrit 22.7 (L) 37.0 - 47.0 %   HEMOGLOBIN AND HEMATOCRIT    Collection Time: 10/17/23  2:32 AM   Result Value Ref Range    Hemoglobin 7.7 (L) 12.0 - 16.0 g/dL    Hematocrit 24.0 (L) 37.0 - 47.0 %   HEMOGLOBIN AND HEMATOCRIT    Collection Time: 10/17/23 10:14 AM   Result Value Ref Range    Hemoglobin 7.5 (L) 12.0 - 16.0 g/dL    Hematocrit 23.5 (L) 37.0 - 47.0 %       Radiology Review:  EC-ECHOCARDIOGRAM COMPLETE W/O CONT   Final Result      CTA ABDOMEN PELVIS W & W/O POST PROCESS   Final Result         1. No evidence of active GI bleeding.   2. Gastric distention, which could be secondary to gastric atony/ileus or partial outlet obstruction.      CT-HEAD W/O   Final Result      No acute process.         DX-CHEST-PORTABLE (1 VIEW)   Final Result      No acute process.      DX-PELVIS-1 OR 2 VIEWS   Final Result      Normal exam.            MDM (Data Review):   -Records reviewed and summarized in current documentation  -I personally reviewed and interpreted the laboratory results  -I personally reviewed the radiology images    Assessment/Recommendations:  Gastric outlet obstruction  Hiatal hernia  GERD grade B  Gastric ulcers  Upper GI bleeding- resolved  Anemia secondary to GI bleed  Severe protein calorie malnutrition  Dementia  Hypertension  Hypothyroidism  Epigastric pain  Weight loss  Early satiety and anorexia  Abnormal ct scan of the stomach with gastric distension  Renal insufficiency  CAD     MDM:  This is a 89-year-old female with a past medical history as  listed above who presented to [facility]14/20[facility] with hematemesis and melena.  She was receiving PRBCs for stabilization and underwent EGD on 10/15/2023 during which time 3 cm hiatal hernia was seen in addition to GERD grade B, food in stomach with old blood, 2 gastric ulcers 1 at the pylorus extending to duodenal bulb and the other was a linear ulcer 3 cm in length at the antrum of the stomach.  Additionally, patient was found to have gastric outlet obstruction as the scope was not able to safely pass through the narrow opening of about 1-2 mm due to acute inflammation and chronic deformity.  Patient will need time for acute inflammation to subside. Inflammation may weeks to resolve and patient would have little benefit from repeat EGD prior to discharge. Recommend OP EGD in 4 weeks.  Biopsies were obtained and are pending.  Postprocedurally, Hemoglobin stable postprocedurally and BUN downtrending. Patient had single dark BM last night, suspect this is remnant blood that is passing through bowels. She is tolerating clear liquid diet. Ok to advance to full liquid diet.        Plan:  PPI 40 mg twice daily IV x3 days in total then twice daily oral dose x8 weeks, then taper.  Upon initiation of oral medication, please open the capsule and take it with apple sauce to facilitate the healing of marginal ulcer.   Full Liquid diet  Palliative care and dietary involved  GI to follow biopsies  Repeat EGD in 4 weeks- referral placed.     No further inventions from acute GI team.  GI to sign off.  Please reconsult for any further questions or concerns.    Discussed with patient, daughter at bedside, Dr. Hart, Dr. Joel    Core Quality Measures   Reviewed items::  Labs, Medications and Radiology reports reviewed

## 2023-10-17 NOTE — CARE PLAN
The patient is Stable - Low risk of patient condition declining or worsening    Shift Goals  Clinical Goals: Monitor Hgb  Patient Goals: Safety, rest  Family Goals: nutrition    Progress made toward(s) clinical / shift goals:      Patient is not progressing towards the following goals: Due to chronic condition: dementia       Problem: Knowledge Deficit - Standard  Goal: Patient and family/care givers will demonstrate understanding of plan of care, disease process/condition, diagnostic tests and medications  Description: Target End Date:  1-3 days or as soon as patient condition allows    Document in Patient Education    1.  Patient and family/caregiver oriented to unit, equipment, visitation policy and means for communicating concern  2.  Complete/review Learning Assessment  3.  Assess knowledge level of disease process/condition, treatment plan, diagnostic tests and medications  4.  Explain disease process/condition, treatment plan, diagnostic tests and medications  Outcome: Not Progressing  Note: Patient is willing to listen to teaching but unable to retain meaningful information. Teachings should take place with family at bedside.

## 2023-10-17 NOTE — PROGRESS NOTES
Assumed care of patient at bedside report from daytime RN. Updated on POC. Patient currently A & O x 2; on RA ; up x1 assist to BSC; without complaints of acute pain. Call light within reach. Whiteboard updated. Fall precautions in place. Bed locked and in lowest position. All questions answered. No other needs indicated at this time.   Patient laying in bed resting with family at bed side.

## 2023-10-17 NOTE — DIETARY
Nutrition Update:    Day 3 of admit.  Anthony Lynn is a 89 y.o. female with admitting DX of GIB (gastrointestinal bleeding) [K92.2].  Patient being followed to optimize nutrition.    Current Diet: full liquid diet. Supplement BID when diet advanced.     Diet advanced to full liquids this morning. PO intake on clears was poor at 0 and 25-50%. PO intake of full liquids at breakfast was good at 50-75%.      Nutrition Rep (NR) is going to see pt today for supplement preference.    Problem: Nutritional:  Goal: Achieve adequate nutritional intake  Description: Patient will consume 50% of meals  Outcome: progressing    RD following.

## 2023-10-17 NOTE — DISCHARGE PLANNING
Request received to assess patient for Enhanced Home Health from Migdalia LLANES. Referral pending review until completion of Pt and OT evaluation. Thanks

## 2023-10-18 NOTE — CARE PLAN
Pt had no acute events today. Very pleasant/cooperative. A&Ox2-3. VSS on RA. Denies pain. OOB with FWW, Ax1. Tolerating full liquid diet. 0.9% NaCl with KCl 40mEq running @ 75mL/hr. Report called to Yael for transfer to [facility]. All belongings gathered. No further questions/concerns at this time.          Problem: Knowledge Deficit - Standard  Goal: Patient and family/care givers will demonstrate understanding of plan of care, disease process/condition, diagnostic tests and medications  Outcome: Progressing     Problem: Skin Integrity  Goal: Skin integrity is maintained or improved  Outcome: Progressing     Problem: Fall Risk  Goal: Patient will remain free from falls  Outcome: Progressing      Rotation Flap Text: The defect edges were debeveled with a #15 scalpel blade.  Given the location of the defect, shape of the defect and the proximity to free margins a rotation flap was deemed most appropriate.  Using a sterile surgical marker, an appropriate rotation flap was drawn incorporating the defect and placing the expected incisions within the relaxed skin tension lines where possible.    The area thus outlined was incised deep to adipose tissue with a #15 scalpel blade.  The skin margins were undermined to an appropriate distance in all directions utilizing iris scissors.

## 2023-10-18 NOTE — CARE PLAN
The patient is Stable - Low risk of patient condition declining or worsening    Problem: Knowledge Deficit - Standard  Goal: Patient and family/care givers will demonstrate understanding of plan of care, disease process/condition, diagnostic tests and medications  Outcome: Progressing  Note: Patient will verbalize understanding of education provided throughout the shift on safety and medications. Opportunities for clarification will be provided at each med pass.     Problem: Skin Integrity  Goal: Skin integrity is maintained or improved  Outcome: Progressing  Note: Patient will not experience skin breakdown this shift. Areas at high risk for breakdown have been assessed. Patient reminded to reposition frequently.     Problem: Fall Risk  Goal: Patient will remain free from falls  Outcome: Progressing  Note: Patient will remain free from falls this shift. Appropriate safety protocols are in place such as bed alarm and sitter.      Shift Goals  Clinical Goals: Satble H&H, safety  Patient Goals: Comfort  Family Goals: Safety, diet    Progress made toward(s) clinical / shift goals:  safety protocols in place, patient reoriented     Patient is not progressing towards the following goals:

## 2023-10-18 NOTE — DISCHARGE PLANNING
HTH/SCP TCN chart review completed. Collaborated with CM. Current discharge considerations are for Home with Home Health services and possible [facility] when medically cleared.  Plan for possible discharge in 1 to 2 days.  [facility] referral pending therapy recommendations.  Patient seen at bedside with daughter Tamara. Family refuses any post-acute placement,  has supportive family and states they have all the equipment/DME needed at home.  Spouse is looking into [facility] for grab bars..  TCN will continue to follow and collaborate with discharge planning team as additional post acute needs arise. Thank you.    Completed:  Awaiting PT/OT recommendations.    Choice obtained: HH ([facility]) on 10/16/23.  Referral to [facility] HH made on 10/16/23.    SCP with [facility].

## 2023-10-18 NOTE — DISCHARGE SUMMARY
Discharge Summary    CHIEF COMPLAINT ON ADMISSION  Chief Complaint   Patient presents with    Chest Pain     STEMI Pre Alert       Reason for Admission  STEMI     Admission Date  10/14/2023    CODE STATUS  DNAR/DNI    HPI & HOSPITAL COURSE  89-year-old female with history of dementia, hypertension, hypothyroidism and chronic kidney disease with coronary artery disease who presented 10/14 with syncope and fall.  Patient lives with her daughter, patient not able to provide history and according to family, patient has been having generalized weakness with low appetite and losing weight, patient had recurrent falls and came with fall on admission.  On admission EKG showed left bundle branch block and STEMI code was called, cardiology evaluated the patient, discontinued the code and heparin drip, and continue medical management with no intervention needed since patient does not have acute coronary syndrome.  However patient developed also coffee-ground emesis and hemoglobin dropped from 11.5-7.7, GI was consulted, and patient underwent EGD which showed 2 gastric ulcers at pylorus extending to duodenal bulb, scope could not pass due to very narrowing opening around 1-2 mm from acute inflammation and a chronic deformity, patient is on IV PPI twice daily and planning to repeat EGD for dilation outpatient.    Patient improved and was able to tolerate oral intake with stable vital signs.  She was transition to oral PPI twice daily.  She was set up with home health.  She will need to eat frequent small meals throughout the day.     Of note her echocardiogram showed a normal LVEF with no clear valvular abnormalities        The patient has moderate to advanced dementia, patient has significant cachectic appearance with muscles loss, patient was alert oriented times0-1, plan was discussed with the patient and her family including daughter, answered all their questions.        Therefore, she is discharged in good and stable  condition to home with organized home healthcare and close outpatient follow-up.    The patient met 2-midnight criteria for an inpatient stay at the time of discharge.    Discharge Date  October 18, 2023    FOLLOW UP ITEMS POST DISCHARGE  Follow-up with [facility] for repeat EGD in 1 to 2 months    DISCHARGE DIAGNOSES  Principal Problem:    GIB (gastrointestinal bleeding) with acute on chronic blood loss anemia (POA: Yes)  Active Problems:    Primary hypertension (Chronic) (POA: Yes)    GERD (gastroesophageal reflux disease) (Chronic) (POA: Yes)    Stage 3b chronic kidney disease (HCC) (Chronic) (POA: Yes)      Overview: Chronic, reports she see's nephrology Dr. Johnson but is wanting to not see       specialist for her kidney function. Records requested. States she has been       told it is due to taking Excedrin but also continues to take 1000 mg daily       for pain control. June 2021 GFR 34, BUN 32, Creatinine 1.44, currently       also taking Lisinopril. Will review records once obtained from Nephrology    Other specified hypothyroidism (Chronic) (POA: Yes)    Anemia (POA: Yes)    Dementia (HCC) (POA: Yes)    Major depressive disorder with single episode, in full remission (HCC) (Chronic) (POA: Yes)    PAD (peripheral artery disease) (HCC) (Chronic) (POA: Yes)    Syncope (POA: Unknown)    Severe protein-calorie malnutrition (HCC) (POA: Yes)  Resolved Problems:    * No resolved hospital problems. *      FOLLOW UP  No future appointments.  No follow-up provider specified.    MEDICATIONS ON DISCHARGE     Medication List        CHANGE how you take these medications        Instructions   omeprazole 20 MG delayed-release capsule  What changed:   medication strength  how much to take  when to take this  Commonly known as: PriLOSEC   Take 1 Capsule by mouth 2 times a day.  Dose: 20 mg            CONTINUE taking these medications        Instructions   Calcium Magnesium Zinc 333-133-5 MG Tabs   Take 1 Tablet by mouth  every day.  Dose: 1 Tablet     docusate sodium 100 MG Caps  Commonly known as: Colace   Take 100 mg by mouth every day.  Dose: 100 mg     * DULoxetine 30 MG Cpep  Commonly known as: Cymbalta   Take 1 Capsule by mouth every evening.  Dose: 30 mg     * DULoxetine 60 MG Cpep delayed-release capsule  Commonly known as: Cymbalta   Take 1 Capsule by mouth every morning.  Dose: 60 mg     levothyroxine 50 MCG Tabs  Commonly known as: Synthroid   Take 1 Tablet by mouth every morning on an empty stomach.  Dose: 50 mcg     lisinopril 20 MG Tabs  Commonly known as: Prinivil   Take 1 Tablet by mouth 2 times a day.  Dose: 20 mg     Naloxone 4 MG/0.1ML Liqd  Commonly known as: Narcan   One spray in one nostril for overdose and call 911.     tizanidine 2 MG tablet  Commonly known as: Zanaflex   TAKE ONE TABLET BY MOUTH AT BEDTIME AS NEEDED     traMADol 50 MG Tabs  Commonly known as: Ultram   Take 1 Tablet by mouth every 8 hours as needed for Moderate Pain or Severe Pain for up to 30 days.  Dose: 50 mg           * This list has 2 medication(s) that are the same as other medications prescribed for you. Read the directions carefully, and ask your doctor or other care provider to review them with you.                  Allergies  Allergies   Allergen Reactions    Nkda [No Known Drug Allergy]        DIET  Orders Placed This Encounter   Procedures    Diet Order Diet: Full Liquid     Standing Status:   Standing     Number of Occurrences:   1     Order Specific Question:   Diet:     Answer:   Full Liquid [11]       ACTIVITY  As tolerated.  Weight bearing as tolerated    CONSULTATIONS  GI    PROCEDURES  EGD    EC-ECHOCARDIOGRAM COMPLETE W/O CONT   Final Result      CTA ABDOMEN PELVIS W & W/O POST PROCESS   Final Result         1. No evidence of active GI bleeding.   2. Gastric distention, which could be secondary to gastric atony/ileus or partial outlet obstruction.      CT-HEAD W/O   Final Result      No acute process.          DX-CHEST-PORTABLE (1 VIEW)   Final Result      No acute process.      DX-PELVIS-1 OR 2 VIEWS   Final Result      Normal exam.            LABORATORY  Lab Results   Component Value Date    SODIUM 140 10/18/2023    POTASSIUM 4.7 10/18/2023    CHLORIDE 111 10/18/2023    CO2 21 10/18/2023    GLUCOSE 102 (H) 10/18/2023    BUN 11 10/18/2023    CREATININE 1.11 10/18/2023    CREATININE 1.21 (H) 12/10/2010    GLOMRATE 43 (L) 12/10/2010        Lab Results   Component Value Date    WBC 7.8 10/18/2023    WBC 5.8 11/04/2010    HEMOGLOBIN 7.7 (L) 10/18/2023    HEMATOCRIT 23.3 (L) 10/18/2023    PLATELETCT 299 10/18/2023        Total time of the discharge process exceeds 43 minutes.

## 2023-10-18 NOTE — PROGRESS NOTES
Deescalating sitter(1:) to Tele-sitter- they will arrive in 5 minutes to place monitor in room for safety.

## 2023-10-18 NOTE — FACE TO FACE
Face to Face Supporting Documentation - Home Health    The encounter with this patient was in whole or in part the primary reason for home health admission.    Date of encounter:   Patient:                    MRN:                       YOB: 2023  Anthony Lynn  [id number]  9/17/1931     Home health to see patient for:  Skilled Nursing care for assessment, interventions & education, Home health aide, Physical Therapy evaluation and treatment, and Occupational therapy evaluation and treatment    Skilled need for:  New Onset Medical Diagnosis peptic ulcer disease    Skilled nursing interventions to include:  Comment: help with therapies    Homebound status evidenced by:  Need the aid of supportive devices such as crutches, canes, wheelchairs or walkers. Leaving home requires a considerable and taxing effort. There is a normal inability to leave the home.    Community Physician to provide follow up care: Lili Ayala M.D.     Optional Interventions? No      I certify the face to face encounter for this home health care referral meets the CMS requirements and the encounter/clinical assessment with the patient was, in whole, or in part, for the medical condition(s) listed above, which is the primary reason for home health care. Based on my clinical findings: the service(s) are medically necessary, support the need for home health care, and the homebound criteria are met.  I certify that this patient has had a face to face encounter by myself.  Scar Malik M.D. - NPI: [id number]

## 2023-10-18 NOTE — THERAPY
Occupational Therapy Contact Note    Attempted OT evaluation, pt just BTB after working with PT and feeling fatigued. Stepdtr at bedside, reports they are just waiting for discharge paperwork. Asked if they had any concerns about managing self care routine at home; stepdtr reports she is concerned about showering. Reviewed showering AE/DME and that home health would also assist with this. Edu on use of BSC over toilet until HH OT can come in and recommend placement of grab bars in bathroom. They denied any further concerns at this time. Will defer OT eval.    Sandy Ventura, OTR/L

## 2023-10-18 NOTE — THERAPY
Physical Therapy   Initial Evaluation     Patient Name: Anthony Lynn  Age:  89 y.o., Sex:  female  Medical Record #: [id number]  Today's Date: 10/18/2023          Assessment  Patient is 89 y.o. female w/ hx of PVD, HTN, GERD, CKD3, CAD, MI.  Admitted w/ syncopal episode and coffee ground emesis.  STEMI ruled out.  GIB w/ malnutritiion.  She lives w/ her step daughter, who was present at the bedside t/o the eval, in a single story house w/ no stairs to enter.  She owns multiple AD's, fww, 4ww, quad cane, and uses each PRN.  Today, she is able to move in/out of bed w/o assist and w/o use of bed features.  She is able to stand and ambulate w/ a fww w/o loss of balance or need of assist.  No acute PT Needs.  Plan    Physical Therapy Initial Treatment Plan   Duration: Evaluation only    DC Equipment Recommendations: None  Discharge Recommendations: Recommend home health for continued physical therapy services       Objective       10/18/23 1334   Prior Living Situation   Housing / Facility 1 Story House   Steps Into Home 0   Steps In Home 0   Equipment Owned Front-Wheel Walker;4-Wheel Walker;Quad Cane   Lives with - Patient's Self Care Capacity Adult Children   Comments step daughter available 24/7   Prior Level of Functional Mobility   Bed Mobility Independent   Transfer Status Independent   Ambulation Independent   Assistive Devices Used Quad Cane   Cognition    Level of Consciousness Alert   Strength Lower Body   Comments no gross deviations   Balance Assessment   Sitting Balance (Static) Fair +   Sitting Balance (Dynamic) Fair +   Standing Balance (Static) Fair   Standing Balance (Dynamic) Fair   Weight Shift Sitting Good   Weight Shift Standing Good   Comments w/ fww   Bed Mobility    Supine to Sit Supervised   Sit to Supine Supervised   Gait Analysis   Gait Level Of Assist Supervised   Assistive Device Front Wheel Walker   Distance (Feet) 150   Functional Mobility   Sit to Stand Supervised   Physical Therapy  Initial Treatment Plan    Duration Evaluation only   Anticipated Discharge Equipment and Recommendations   DC Equipment Recommendations None   Discharge Recommendations Recommend home health for continued physical therapy services

## 2023-10-18 NOTE — PROGRESS NOTES
4 Eyes Skin Assessment Completed by JAYNA Jones and JAYNA Hsu.    Head WDL  Ears WDL  Nose WDL  Mouth WDL  Neck WDL  Breast/Chest WDL  Shoulder Blades WDL  Spine WDL  (R) Arm/Elbow/Hand WDL  (L) Arm/Elbow/Hand WDL  Abdomen WDL  Groin WDL  Scrotum/Coccyx/Buttocks WDL  (R) Leg WDL  (L) Leg WDL  (R) Heel/Foot/Toe WDL  (L) Heel/Foot/Toe Missing left second toe.         Devices In Places Pulse Ox      Interventions In Place Waffle Overlay    Possible Skin Injury No    Pictures Uploaded Into Epic N/A  Wound Consult Placed N/A  RN Wound Prevention Protocol Ordered No  4 Eyes Skin

## 2023-10-18 NOTE — PROGRESS NOTES
Hospital Medicine Daily Progress Note    Date of Service  10/17/2023    Chief Complaint  Anthony Lynn is a 89 y.o. female admitted 10/14/2023 with weakness    Hospital Course:    89-year-old female with history of dementia, hypertension, hypothyroidism and chronic kidney disease with coronary artery disease who presented 10/14 with syncope and fall.  Patient lives with her daughter, patient not able to provide history and according to family, patient has been having generalized weakness with low appetite and losing weight, patient had recurrent falls and came with fall on admission.  On admission EKG showed left bundle branch block and STEMI code was called, cardiology evaluated the patient, discontinued the code and heparin drip, and continue medical management with no intervention needed since patient does not have acute coronary syndrome.  However patient developed also coffee-ground emesis and hemoglobin dropped from 11.5-7.7, GI was consulted, and patient underwent EGD which showed 2 gastric ulcers at pylorus extending to duodenal bulb, scope could not pass due to very narrowing opening around 1-2 mm from acute inflammation and a chronic deformity, patient is on IV PPI twice daily and planning to repeat EGD for dilation           The patient has moderate to advanced dementia, patient has significant cachectic appearance with muscles loss, patient was alert oriented times0-1, plan was discussed with the patient and her family including daughter, answered all their questions.        Interval Problem Update  10/16:  Evaluated examined the patient at bedside, patient is alert and oriented around 1-2 around her baseline.  -Continue IV PPI twice daily for 3 days, today is the second day, will follow with the GI for possible repeating scope  -Patient is on clear diet  -Continue monitoring hemoglobin every 12 hours  -Daughter asked for vitamins, start with multivitamins folic and thiamine  -PT and OT and  possible home with home health  10/17:  grand-daughter/caregiver at bedside.  Explained inflammation of stomach needs to heal first, soft GI diet ordered.  Check iron and B12 levels.  Cbc daily.        I have discussed this patient's plan of care and discharge plan at IDT rounds today with Case Management, Nursing, Nursing leadership, and other members of the IDT team.    Consultants/Specialty  GI    Code Status  DNAR/DNI    Disposition  The patient is not medically cleared for discharge to home or a post-acute facility.  Anticipate discharge to: home with organized home healthcare and close outpatient follow-up    I have placed the appropriate orders for post-discharge needs.    Review of Systems  Review of Systems   Unable to perform ROS: Dementia   Constitutional:  Negative for chills, fever and weight loss.   HENT:  Negative for ear pain, hearing loss and tinnitus.    Eyes:  Negative for blurred vision, double vision and photophobia.   Respiratory:  Negative for cough and hemoptysis.    Cardiovascular:  Negative for chest pain, palpitations, orthopnea and claudication.   Gastrointestinal:  Negative for abdominal pain, constipation, diarrhea, nausea and vomiting.   Genitourinary:  Negative for dysuria, frequency and urgency.   Musculoskeletal:  Negative for myalgias and neck pain.   Skin:  Negative for rash.   Neurological:  Negative for dizziness, speech change and weakness.        Physical Exam  Temp:  [36.6 °C (97.9 °F)-37.1 °C (98.8 °F)] 36.7 °C (98.1 °F)  Pulse:  [62-79] 71  Resp:  [16-18] 18  BP: (138-184)/(56-88) 171/78  SpO2:  [95 %-98 %] 98 %    Physical Exam  Constitutional:       Appearance: She is not ill-appearing or toxic-appearing.   HENT:      Head: Normocephalic and atraumatic.   Eyes:      General: No scleral icterus.  Cardiovascular:      Rate and Rhythm: Normal rate.      Heart sounds: No murmur heard.  Pulmonary:      Effort: No respiratory distress.      Breath sounds: No wheezing or rales.    Abdominal:      General: There is no distension.      Palpations: Abdomen is soft.      Tenderness: There is no abdominal tenderness.   Skin:     General: Skin is dry.      Coloration: Skin is pale.      Findings: Bruising present. No erythema, lesion or rash.   Neurological:      Mental Status: Mental status is at baseline. She is disoriented.      Cranial Nerves: No cranial nerve deficit.         Fluids    Intake/Output Summary (Last 24 hours) at 10/17/2023 1938  Last data filed at 10/17/2023 1100  Gross per 24 hour   Intake 200 ml   Output 900 ml   Net -700 ml         Laboratory  Recent Labs     10/15/23  0251 10/15/23  1051 10/17/23  0232 10/17/23  1014 10/17/23  1702   WBC 6.2  --   --   --   --    RBC 2.47*  --   --   --   --    HEMOGLOBIN 7.3*   < > 7.7* 7.5* 7.2*   HEMATOCRIT 22.4*   < > 24.0* 23.5* 22.1*   MCV 90.7  --   --   --   --    MCH 29.6  --   --   --   --    MCHC 32.6  --   --   --   --    RDW 51.0*  --   --   --   --    PLATELETCT 245  --   --   --   --    MPV 10.4  --   --   --   --     < > = values in this interval not displayed.     Recent Labs     10/15/23  0251 10/16/23  0136 10/17/23  1014   SODIUM 134* 142 141   POTASSIUM 3.6 3.1* 4.0   CHLORIDE 102 110 110   CO2 23 21 21   GLUCOSE 87 89 84   BUN 38* 24* 15   CREATININE 1.23 1.10 1.02   CALCIUM 7.7* 8.1* 8.1*                     Imaging  EC-ECHOCARDIOGRAM COMPLETE W/O CONT   Final Result      CTA ABDOMEN PELVIS W & W/O POST PROCESS   Final Result         1. No evidence of active GI bleeding.   2. Gastric distention, which could be secondary to gastric atony/ileus or partial outlet obstruction.      CT-HEAD W/O   Final Result      No acute process.         DX-CHEST-PORTABLE (1 VIEW)   Final Result      No acute process.      DX-PELVIS-1 OR 2 VIEWS   Final Result      Normal exam.           Assessment/Plan  * GIB (gastrointestinal bleeding) with acute on chronic blood loss anemia- (present on admission)  Assessment & Plan  Came with  hematemesis and coffee-ground  Patient was found to have anemia  EGD was done on 10/15 and showed 2 gastric ulcers with the stenosis  Planning to repeat EGD for dilation after decreasing the inflammation  Continue PPI twice daily IV for 3 days  GI on board, appreciate recommendations      Severe protein-calorie malnutrition (HCC)- (present on admission)  Assessment & Plan  Dietitian consult    PAD (peripheral artery disease) (HCC)- (present on admission)  Assessment & Plan  Holding blood thinner due to GI bleeding  Continue lisinopril and consider statin, patient has significant malnutrition with muscles loss    Major depressive disorder with single episode, in full remission ([facility])- (present on admission)  Assessment & Plan  Continue duloxetine    Dementia ([facility])- (present on admission)  Assessment & Plan  Patient has short memory deficit, dementia  Patient is alert and oriented x1-2  Patient is on high risk for delirium    Anemia- (present on admission)  Assessment & Plan  Check B12, iron, ferritin     Other specified hypothyroidism- (present on admission)  Assessment & Plan  Resume levothyroxine  TSH 5.5    Stage 3b chronic kidney disease (HCC)- (present on admission)  Assessment & Plan  Around her baseline with creatinine 1.2  avoid nephrotoxins    GERD (gastroesophageal reflux disease)- (present on admission)  Assessment & Plan  IV PPI twice daily    Primary hypertension- (present on admission)  Assessment & Plan  Continue lisinopril  Avoid aggressive since patient has anemia         VTE prophylaxis:   SCDs/TEDs      I have performed a physical exam and reviewed and updated ROS and Plan today (10/17/2023). In review of yesterday's note (10/16/2023), there are no changes except as documented above.

## 2023-10-18 NOTE — PROGRESS NOTES
Assumed patient care at 0700. Patient Aox2. Patient denies need for pain interventions at this time. Patient belongings are nearby, bed set in low position, locked, and call light within reach. Will continue to monitor patient during shift accordingly.      Sitter 1:1 in room.

## 2023-10-18 NOTE — DISCHARGE PLANNING
"HTH/SCP TCN chart review completed. Collaborated with RENETTA Kasper and PT. Current discharge considerations are now for dc to home with [facility]. Patient seen at bedside. Note that pt was awaiting updated PT recs for possible EHH v HH. At time of writing this note, PT has visited with pt and per RENETTA, \"RENETTA informed by [facility] patient does not qualify for [facility].  Family wants to take patient home with regular Home Health, PT/OT and  a CNA today\". TCN will continue to follow and collaborate with discharge planning team as additional post acute needs arise. Thank you.    Completed:  PT with recommendations for HH on 10/18  Choice obtained: HH ([facility] has accepted as of today 10/18) on 10/16/23.  Referral to [facility] made on 10/16/23; see above    SCP with [facility] PCP.  "

## 2023-10-18 NOTE — DISCHARGE PLANNING
Received Choice form at 1312  Agency/Facility Name: [facility]   Referral sent per Choice form @ 6146    @1289  Agency/Facility Name: [facility]   Outcome: Per chart note and epic response, referral accepted.

## 2023-10-18 NOTE — PROGRESS NOTES
Patient arrived on floor with transport @1940. Daughter at bedside. Aldo (sitter) at bedside. Assumed care of patient. Assessment performed. 2 RN skin check performed. No reports of pain, discomfort, or shortness of breath. Call light and belongings within reach. Concerns about diet have been addressed.

## 2023-10-18 NOTE — DISCHARGE PLANNING
Meet with patient and daughter at bed side to discuss [facility] health vs Home health. Daughter states she wants her mom to discharge today and she is agreeable to Home Health. Eliot DANIELS and Migdalia LLANES notified.

## 2023-10-18 NOTE — DISCHARGE PLANNING
ATTN: Case Management  RE: Referral for Home Health    As of 10/18/23, we have accepted the Home Health referral for the patient listed above.    A [facility] clinician will be out to see the patient within 48 hours. If you have any questions or concerns regarding the patient's transition to Home Health, please do not hesitate to contact us at [phone].      We look forward to collaborating with you,  [facility] Home Health Team

## 2023-10-18 NOTE — PROGRESS NOTES
Bedside report received from day RN, pt care assumed, assessment completed. Pt is A&Ox3, SR on the monitor. Updated on POC, questions answered. Bed in lowest, locked position, treaded socks on, call light and belongings within reach.

## 2023-10-18 NOTE — DISCHARGE PLANNING
Case Management Discharge Planning    Admission Date: 10/14/2023  GMLOS: 4.6  ALOS: 4    6-Clicks ADL Score: 14  6-Clicks Mobility Score: 18  PT and/or OT Eval ordered: Yes  Post-acute Referrals Ordered: No  Post-acute Choice Obtained: No  Has referral(s) been sent to post-acute provider:  No      Anticipated Discharge Dispo: Discharge Disposition: Discharged to home/self care (01)    DME Needed: No    Action(s) Taken: Updated Provider/Nurse on Discharge Plan    Escalations Completed: None    Medically Clear: Yes    Next Steps: n/a    Barriers to Discharge: None    Is the patient up for discharge tomorrow: No  SW informed by [facility] patient does not qualify for [facility].  Family wants to take patient home with regular Home Health, PT/OT and  a CNA today.    Volt to Dr Malik with above and request for Home Health     Sw faxed Choice to [facility]

## 2023-10-18 NOTE — DISCHARGE INSTRUCTIONS
WHEN TAKING YOUR OMEPRAZOLE (MEDICATION WHICH HELPS WITH DECREASING ACID IN YOUR STOMACH), OPEN THE CAPSULE AND PLACE IN APPLE SAUCE TO TAKE THE MEDICATION TWICE DAILY. THANK YOU.

## 2023-10-19 NOTE — PROGRESS NOTES
Transitional Care Management  TCM Outreach Date and Time: Filed (10/19/2023  9:14 AM)    Discharge Questions  Actual Discharge Date: 10/18/23  Now that you are home, how are you feeling?: Fair  Did you receive any new prescriptions?: Yes  Were you able to get them filled?: Yes  Meds to Bed or Pharmacy filled?: Meds to Bed  Do you have any questions about your current medications or new medications (Review Med Rec)?: No  Do you have a follow up appointment scheduled with your PCP?: Yes  Appointment Date: 10/24/23  Appointment Time: 1440  Any issues or paperwork you wish to discuss with your PCP?: No  Does this patient qualify for the CCM program?: Yes    Transitional Care  Number of attempts made to contact patient: 1  Current or previous attempts competed within two business days of discharge? : Yes  Provided education regarding treatment plan, medications, self-management, ADLs?: Yes  Has patient completed an Advanced Directive?: No  Has the Care Manager's phone number provided?: Yes  Is there anything else I can help you with?: No    Discharge Summary  Chief Complaint: Chest Pain  Admitting Diagnosis: STEMI  Discharge Diagnosis: GIB (gastrointestinal bleeding) with acute on chronic blood loss anemia     Spoke with Keith. Discussed Omeprazole Rx, Tacailyn was given Rx at discharge, Keith reports she is taking. Keith questioned pain medication, reviewed chart. Advised no NSAIDS d/t kidney function. Reinforced that the Cymbalta/Duloxetine helps with pain and Tacy is taking 90mg every morning, Anthony also has an Rx for Ultram she can take every 8 hours, discussed use of Tylenol prn.   Keith states no one has called from GI or  yet. Keith asked that I call back later today to see if they have been contacted by either office.  Scheduled TCM appt at pc office, Keith questioned why appt could not be scheduled w/pcpJamie? Advised Dr Ayala does not have any TCM appts until 11/17/2023 and that pt needs to be seen  within 7-14 days of discharge. No other concerns or questions.

## 2023-10-20 NOTE — PROGRESS NOTES
Was watching chart to ensure HH contacted pt/Keith to establish services. Looks like they reached out today and scheduled appt for 10/22/23 at 1245.  Called Keith to ensure an appt has been scheduled w/GI. No answer, LVM with info below and recommendation that [facility] is called and appt is scheduled as soon as possible. Keith has my number if they have questions.       [facility]  [address] [place]. [address]  Phone: [phone]

## 2023-10-24 NOTE — PROGRESS NOTES
"Subjective       CC:   Chief Complaint   Patient presents with    Transitional Care Management Hospital Follow-up     10/14/23 Collapsed couldn't get up, confusion, throwing up blood GI bleed ulcer, GI Referall         HPI:   Patient is a 89 y.o. established female patient of Dr Michelle with medical history listed below significant for GERD, dementia, hypertension, hypothyroidism, CKD here today for hospital follow up. Patient lives with her daughter who provides history on patient today. Her  is also present for this visit.     Patient presented to ER on 10/14/2023 with syncope and fall. She was having generalized weakness with low appetite, losing weight; had multiple falls prior to ER visit. ER notes reviewed:   \"On admission EKG showed left bundle branch block and STEMI code was called, cardiology evaluated the patient, discontinued the code and heparin drip, and continue medical management with no intervention needed since patient does not have acute coronary syndrome.  However patient developed also coffee-ground emesis and hemoglobin dropped from 11.5-7.7, GI was consulted, and patient underwent EGD which showed 2 gastric ulcers at pylorus extending to duodenal bulb, scope could not pass due to very narrowing opening around 1-2 mm from acute inflammation and a chronic deformity, patient is on IV PPI twice daily and planning to repeat EGD for dilation outpatient.\"    She was transitioned to oral PPI Omeprazole and discharged home with home health on 10/18/2023. She is to follow up with GI in 1-2 months for repeat EGD.     Patient Active Problem List   Diagnosis    Fibromyalgia    IBS (irritable bowel syndrome)    Dyslipidemia    Hives    Tachyarrhythmia    Primary hypertension    GERD (gastroesophageal reflux disease)    Recurrent major depressive disorder, in full remission (HCC)    Glaucoma    LBBB (left bundle branch block)    Stage 3b chronic kidney disease (HCC)    Fatigue    Other hammer toe " (acquired)    Other specified hypothyroidism    Anemia    Sinus bradycardia    Uncomplicated opioid dependence (HCC)    Dementia (HCC)    Left hip pain    Acquired absence of other left toe(s) ([facility])    BMI 22.0-22.9, adult    Major depressive disorder with single episode, in full remission ([facility])    PAD (peripheral artery disease) ([facility])    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)    Cerebral atrophy (HCC)    Atherosclerosis of aorta ([facility])    Controlled substance agreement signed    Falls frequently    Frailty    Constipation    Syncope    GIB (gastrointestinal bleeding) with acute on chronic blood loss anemia    Severe protein-calorie malnutrition ([facility])       Past Medical History:   Diagnosis Date    SUMA (acute kidney injury) ([facility]) 7/25/2018    Anxiety     Arthritis     CAD (coronary artery disease)     CATARACT      B/L    Dental disorder     upper and lower dentures    Dyslipidemia     Fibromyalgia     Generalized abdominal pain 10/14/2021    GERD (gastroesophageal reflux disease)     Glaucoma     Headache(784.0)     Hives     Hives     HTN     IBS (irritable bowel syndrome)     Indigestion     Major depression in remission ([facility]) 5/31/2022    Myocardial infarct ([facility])     2005    PAD (peripheral artery disease) ([facility]) 5/31/2022    Pain 7/31/15    all over 3/10 (hx of fibromyalgia    Palpitations     Psychiatric problem     depression    Renal insufficiency 4/12/2011    Snoring     Toe dislocation 5/17/2012    Unspecified disorder of thyroid     hypothroidism, no longer on meds    Unspecified urinary incontinence         Past Surgical History:   Procedure Laterality Date    IA UPPER GI ENDOSCOPY,DIAGNOSIS N/A 10/15/2023    Procedure: GASTROSCOPY;  Surgeon: Karrie Morales M.D.;  Location: [facility];  Service: Gastroenterology    IA UPPER GI ENDOSCOPY,BIOPSY N/A 10/15/2023    Procedure: GASTROSCOPY, WITH BIOPSY;  Surgeon: Karrie Morales M.D.;  Location: [facility];  Service:  Gastroenterology    TOE AMPUTATION Left 8/6/2015    Procedure: TOE AMPUTATION 2ND;  Surgeon: Fredi Nelson D.P.M.;  Location: SURGERY [facility];  Service:     CATARACT EXTRACTION WITH IOL      OTHER      cataracts removed        Current Outpatient Medications on File Prior to Visit   Medication Sig Dispense Refill    acetaminophen (TYLENOL) 500 MG Tab Take 500-1,000 mg by mouth every 6 hours as needed for Mild Pain. Indications: mild pain      omeprazole (PRILOSEC) 20 MG delayed-release capsule Take 1 Capsule by mouth 2 times a day. 60 Capsule 1    traMADol (ULTRAM) 50 MG Tab Take 1 Tablet by mouth every 8 hours as needed for Moderate Pain or Severe Pain for up to 30 days. 90 Tablet 0    lisinopril (PRINIVIL) 20 MG Tab Take 1 Tablet by mouth 2 times a day. 200 Tablet 0    DULoxetine (CYMBALTA) 30 MG Cap DR Particles Take 1 Capsule by mouth every evening. 90 Capsule 0    DULoxetine (CYMBALTA) 60 MG Cap DR Particles delayed-release capsule Take 1 Capsule by mouth every morning. 90 Capsule 0    tizanidine (ZANAFLEX) 2 MG tablet TAKE ONE TABLET BY MOUTH AT BEDTIME AS NEEDED 100 Tablet 2    levothyroxine (SYNTHROID) 50 MCG Tab Take 1 Tablet by mouth every morning on an empty stomach. 100 Tablet 3    docusate sodium (COLACE) 100 MG Cap Take 100 mg by mouth every day. Indications: Constipation      Naloxone (NARCAN) 4 MG/0.1ML Liquid One spray in one nostril for overdose and call 911. 1 Each 0     No current facility-administered medications on file prior to visit.        ROS:  Review of Systems   Constitutional: Negative.  Negative for fever and malaise/fatigue.   HENT: Negative.     Eyes: Negative.    Respiratory:  Negative for cough, sputum production and shortness of breath.    Cardiovascular:  Negative for chest pain, palpitations and leg swelling.   Gastrointestinal: Negative.    Genitourinary: Negative.    Musculoskeletal: Negative.         Wheelchair   Neurological: Negative.    Endo/Heme/Allergies:  "Negative.    Psychiatric/Behavioral: Negative.         Objective       Exam:  /66   Pulse 72   Temp 36.5 °C (97.7 °F) (Temporal)   Resp 13   Ht 1.499 m (4' 11\")   Wt 43.5 kg (96 lb)   SpO2 93%   BMI 19.39 kg/m²  Body mass index is 19.39 kg/m².    Physical Exam  Constitutional:       Appearance: Normal appearance.   Cardiovascular:      Rate and Rhythm: Normal rate and regular rhythm.      Pulses: Normal pulses.      Heart sounds: Normal heart sounds.   Pulmonary:      Effort: Pulmonary effort is normal.      Breath sounds: Normal breath sounds.   Abdominal:      General: Bowel sounds are normal.      Palpations: Abdomen is soft.      Tenderness: There is no abdominal tenderness.   Musculoskeletal:      Right lower leg: No edema.      Left lower leg: No edema.      Comments: Generalized weakness; in wheelchair   Skin:     General: Skin is warm and dry.   Neurological:      Mental Status: She is alert. Mental status is at baseline.      Comments: Alert to self only         Labs: reviewed    Assessment & Plan       89 y.o. female with the following -   1. Hospital discharge follow-up  2. Gastroesophageal reflux disease with esophagitis without hemorrhage  Hx of GERD, dementia, hypertension, hypothyroidism, CKD hospitalized 10/14/2023-10/18/2023 with syncope, coffee ground emesis, anemia of hemoglobin 7.7. Gastric ulcer x2 seen on EGD 10/15/2023. Tolerating oral Omeprazole. No abdominal pain, vomiting or nausea reported today. Discharge instructions reviewed with daughter/, questions answered.  - continue Omeprazole 20mg BID  - follow up with GI for repeat EGD with dilation; daughter will call to schedule  - recheck CBC, iron panel; will order for home health to draw    - continue with home health; PT coming out this week per     Educated in proper administration of medication(s) ordered today including safety, possible SE, risks, benefits, rationale and alternatives to therapy.     Return for " chronic disease management with PCP.    Please note that this dictation was created using voice recognition software. I have made every reasonable attempt to correct obvious errors, but I expect that there are errors of grammar and possibly content that I did not discover before finalizing the note.    HCC Gap Form    Diagnosis to address: E43 - Severe protein-calorie malnutrition (HCC)  Assessment and plan: Chronic, stable. Continue with current defined treatment plan: home health; protein shakes. Follow-up at least annually.  Diagnosis: F01.50 - Vascular dementia without behavioral disturbance, psychotic disturbance, mood disturbance, or anxiety, unspecified dementia severity (HCC)  Assessment and plan: Chronic, stable. Continue with current defined treatment plan: lives with her daughter, ; home health going out to the house. Follow-up at least annually.  Last edited 10/24/23 15:12 PDT by Poly Somers D.N.P.

## 2023-10-30 NOTE — Clinical Note
I agree with these changes  ----- Message -----  From: Marjan Hernandes R.N.  Sent: 10/30/2023   3:07 PM PDT  To: Tricia Shipley R.N.      Quality Review for 10.22.23 SOC [employer] performed on by SALONI Hernandes RN on 10.30.2023:     Edits completed by SALONI Hernandes RN:  1.  and  dx coding updated per chart review.   2. Changed  to 10.22.23 per LSOC order  3. Added #6 to  per PT eval reporting judgement impaired and refuses to use DME for safety. Changed  to 10.27.23 per the [employer] collaboration convention  4. Changed  to 3 per SNV on 10.26.23. Changed  to 2 and  to 4 per PT eval.   5. Changed  to 3 per OT eval on 10.27.23 add to functional limitations  6. Changed  to 2, [id number] C to 4 per ADL reporting SBA  7. Changed  to 3  8. Added adequate emergency plan, ambulate only with assistance and proper medication use to safety measures  9.  Updated F2F data  10. Changed  to 7

## 2023-10-30 NOTE — CASE COMMUNICATION
Quality Review for 10.22.23 [employer] performed on by SALONI Hernandes RN on 10.30.2023:     Edits completed by SALONI Hernandes RN:  1.  and  dx coding updated per chart review.   2. Changed  to 10.22.23 per LSOC order  3. Added #6 to  per PT eval reporting judgement impaired and refuses to use DME for safety. Changed  to 10.27.23 per the [employer] collaboration convention  4. Changed  to 3 per SNV on 10.26.23. Changed M174 0 to 2 and  to 4 per PT eval.   5. Changed  to 3 per OT eval on 10.27.23 add to functional limitations  6. Changed  to 2, [id number] C to 4 per ADL reporting SBA  7. Changed  to 3  8. Added adequate emergency plan, ambulate only with assistance and proper medication use to safety measures  9.  Updated F2F data  10. Changed  to 7

## 2023-11-02 NOTE — ED NOTES
Discharged in stable condition, alert and oriented, accompanied by daughter.  Prescribed medication and follow up appointment instructed. Health education imparted. Instructed to come back once symptoms worsened. Pt verbalized understanding of the information given. Removed IV line and applied pressure.

## 2023-11-02 NOTE — ED PROVIDER NOTES
"ED Provider Note    CHIEF COMPLAINT  Chief Complaint   Patient presents with    Rectal Bleeding     Drk stool x 1 today  \" Like coffee grounds \" per daughter  Recent admission for UGIB  Referred to ED by GI  Associated with fatigue and N/V Denies dizziness       EXTERNAL RECORDS REVIEWED  Discharge summary completed on 10/18/2023  89-year-old female with history of dementia, hypertension, hypothyroidism and chronic kidney disease with coronary artery disease who presented 10/14 with syncope and fall.  Patient lives with her daughter, patient not able to provide history and according to family, patient has been having generalized weakness with low appetite and losing weight, patient had recurrent falls and came with fall on admission.  On admission EKG showed left bundle branch block and STEMI code was called, cardiology evaluated the patient, discontinued the code and heparin drip, and continue medical management with no intervention needed since patient does not have acute coronary syndrome.  However patient developed also coffee-ground emesis and hemoglobin dropped from 11.5-7.7, GI was consulted, and patient underwent EGD which showed 2 gastric ulcers at pylorus extending to duodenal bulb, scope could not pass due to very narrowing opening around 1-2 mm from acute inflammation and a chronic deformity, patient is on IV PPI twice daily and planning to repeat EGD for dilation outpatient.     Patient improved and was able to tolerate oral intake with stable vital signs.  She was transition to oral PPI twice daily.  She was set up with home health.  She will need to eat frequent small meals throughout the day.     Of note her echocardiogram showed a normal LVEF with no clear valvular abnormalities        The patient has moderate to advanced dementia, patient has significant cachectic appearance with muscles loss, patient was alert oriented times0-1, plan was discussed with the patient and her family including " daughter, answered all their questions.       HPI/ROS  LIMITATION TO HISTORY     OUTSIDE HISTORIAN(S):  Family daughter is at bedside and history review of systems stating that they went to GI earlier today and complained that the patient had black stool therefore they were just sent here for evaluation.    Anthony Lynn is a 89 y.o. female who presents states that she had coffee-ground stool earlier today and was seen at GI by Elayne Mae who stated the patient should come to the hospital for evaluation.  The patient does have a history of peptic ulcer disease on PPI twice daily and the daughter states that the patient cannot keep anything down after she eats and she is feeling extremely weak.     PAST MEDICAL HISTORY   has a past medical history of SUMA (acute kidney injury) ([facility]) (7/25/2018), Anxiety, Arthritis, CAD (coronary artery disease), CATARACT, Dental disorder, Dyslipidemia, Fibromyalgia, Generalized abdominal pain (10/14/2021), GERD (gastroesophageal reflux disease), Glaucoma, Headache(784.0), Hives, Hives, HTN, IBS (irritable bowel syndrome), Indigestion, Major depression in remission ([facility]) (5/31/2022), Myocardial infarct ([facility]), PAD (peripheral artery disease) ([facility]) (5/31/2022), Pain (7/31/15), Palpitations, Psychiatric problem, Renal insufficiency (4/12/2011), Snoring, Toe dislocation (5/17/2012), Unspecified disorder of thyroid, and Unspecified urinary incontinence.    SURGICAL HISTORY   has a past surgical history that includes other; cataract extraction with iol; toe amputation (Left, 8/6/2015); upper gi endoscopy,diagnosis (N/A, 10/15/2023); and upper gi endoscopy,biopsy (N/A, 10/15/2023).    FAMILY HISTORY  Family History   Problem Relation Age of Onset    Heart Disease Father     Lung Disease Father        SOCIAL HISTORY  Social History     Tobacco Use    Smoking status: Never    Smokeless tobacco: Never   Vaping Use    Vaping Use: Never used   Substance and Sexual Activity    Alcohol  use: No    Drug use: No    Sexual activity: Not Currently     Partners: Male       CURRENT MEDICATIONS  Home Medications    **Home medications have not yet been reviewed for this encounter**         ALLERGIES  Allergies   Allergen Reactions    Nkda [No Known Drug Allergy]        PHYSICAL EXAM  VITAL SIGNS: /51   Pulse 84   Temp 36.5 °C (97.7 °F) (Temporal)   Resp 16   Ht 1.524 m (5')   Wt 42.6 kg (93 lb 14.7 oz)   SpO2 95%   BMI 18.34 kg/m²    Nursing notes and vitals reviewed.  Constitutional: Well developed, Well nourished, No acute distress, Non-toxic appearance.   Eyes: PERRLA, EOMI, pale conjunctiva, No discharge.   HENT: Normocephalic, Atraumatic, moist mucous membranes, no facial edema Cardiovascular: Normal heart rate, Normal rhythm, No murmurs, No rubs, No gallops.   Thorax & Lungs: No respiratory distress, No rales, No rhonchi, No wheezing, No chest tenderness.   Abdomen: Scaphoid abdomen, bowel sounds normal, Soft, No tenderness, No guarding, No rebound, No masses, No pulsatile masses.   Skin: Warm, Dry, No erythema, No rash.   Extremities: No deformity, no edema, good range of motion range of motion upper lower extremes bilaterally  Neurologic: Alert & oriented x 3, no focal abnormalities noted, acting appropriately on examination  Psychiatric: Affect normal for clinical presentation.      DIAGNOSTIC STUDIES / PROCEDURES  EKG  I have independently interpreted this EKG  Normal sinus rhythm on monitor    LABS  Results for orders placed or performed during the hospital encounter of 11/01/23   CBC WITH DIFFERENTIAL   Result Value Ref Range    WBC 8.2 4.8 - 10.8 K/uL    RBC 3.00 (L) 4.20 - 5.40 M/uL    Hemoglobin 8.8 (L) 12.0 - 16.0 g/dL    Hematocrit 27.9 (L) 37.0 - 47.0 %    MCV 93.0 81.4 - 97.8 fL    MCH 29.3 27.0 - 33.0 pg    MCHC 31.5 (L) 32.2 - 35.5 g/dL    RDW 54.6 (H) 35.9 - 50.0 fL    Platelet Count 555 (H) 164 - 446 K/uL    MPV 9.2 9.0 - 12.9 fL    Neutrophils-Polys 60.70 44.00 - 72.00  %    Lymphocytes 30.50 22.00 - 41.00 %    Monocytes 7.90 0.00 - 13.40 %    Eosinophils 0.20 0.00 - 6.90 %    Basophils 0.20 0.00 - 1.80 %    Immature Granulocytes 0.50 0.00 - 0.90 %    Nucleated RBC 0.00 0.00 - 0.20 /100 WBC    Neutrophils (Absolute) 4.97 1.82 - 7.42 K/uL    Lymphs (Absolute) 2.50 1.00 - 4.80 K/uL    Monos (Absolute) 0.65 0.00 - 0.85 K/uL    Eos (Absolute) 0.02 0.00 - 0.51 K/uL    Baso (Absolute) 0.02 0.00 - 0.12 K/uL    Immature Granulocytes (abs) 0.04 0.00 - 0.11 K/uL    NRBC (Absolute) 0.00 K/uL   COMP METABOLIC PANEL   Result Value Ref Range    Sodium 139 135 - 145 mmol/L    Potassium 4.1 3.6 - 5.5 mmol/L    Chloride 97 96 - 112 mmol/L    Co2 24 20 - 33 mmol/L    Anion Gap 18.0 (H) 7.0 - 16.0    Glucose 87 65 - 99 mg/dL    Bun 17 8 - 22 mg/dL    Creatinine 1.14 0.50 - 1.40 mg/dL    Calcium 9.1 8.4 - 10.2 mg/dL    Correct Calcium 9.6 8.5 - 10.5 mg/dL    AST(SGOT) 16 12 - 45 U/L    ALT(SGPT) 7 2 - 50 U/L    Alkaline Phosphatase 64 30 - 99 U/L    Total Bilirubin 0.2 0.1 - 1.5 mg/dL    Albumin 3.4 3.2 - 4.9 g/dL    Total Protein 6.9 6.0 - 8.2 g/dL    Globulin 3.5 1.9 - 3.5 g/dL    A-G Ratio 1.0 g/dL   LIPASE   Result Value Ref Range    Lipase 21 11 - 82 U/L   MAGNESIUM   Result Value Ref Range    Magnesium 1.9 1.5 - 2.5 mg/dL   PHOSPHORUS   Result Value Ref Range    Phosphorus 3.4 2.5 - 4.5 mg/dL   CRP QUANTITIVE (NON-CARDIAC)   Result Value Ref Range    Stat C-Reactive Protein 1.26 (H) 0.00 - 0.75 mg/dL   TROPONIN   Result Value Ref Range    Troponin T 27 (H) 6 - 19 ng/L   PROTHROMBIN TIME   Result Value Ref Range    PT 13.5 12.0 - 14.6 sec    INR 0.98 0.87 - 1.13   APTT   Result Value Ref Range    APTT 29.0 24.7 - 36.0 sec   TSH WITH REFLEX TO FT4   Result Value Ref Range    TSH 6.420 (H) 0.380 - 5.330 uIU/mL   ESTIMATED GFR   Result Value Ref Range    GFR (CKD-EPI) 45 (A) >60 mL/min/1.73 m 2   FREE THYROXINE   Result Value Ref Range    Free T-4 1.46 0.93 - 1.70 ng/dL   EKG   Result Value Ref  Range    Report       [facility] Emergency Dept.    Test Date:  2023  Pt Name:    KEREN HUFF                Department: EDSM  MRN:        [id number]                      Room:       -ROOM 1  Gender:     Female                       Technician: [id number]  :        [id number]                   Requested By:CHARLES SEQUEIRA  Order #:    [id number]                    Reading MD: CHARLES SEQUEIRA, DO    Measurements  Intervals                                Axis  Rate:       71                           P:          60  TN:         191                          QRS:        57  QRSD:       153                          T:          72  QT:         513  QTc:        558    Interpretive Statements  Sinus rhythm  IVCD, consider atypical LBBB  Compared to ECG 10/14/2023 08:38:47  Ectopic atrial rhythm no longer present  Electronically Signed On 2023 18:57:05 PDT by CHARLES SEQUEIRA, DO           COURSE & MEDICAL DECISION MAKING    ED Observation Status? No      INITIAL ASSESSMENT, COURSE AND PLAN  Care Narrative: This 89-year-old female presents with complaint of black stools.  She had a hospitalization here secondary to GI bleed and was started on omeprazole.  She went to  today and is told her to come here because her complaint of vomiting as well as black stool.  Here her stool is brown, her hemoglobin 8.8 which is second improvement.  The patient has no evidence of increasing GI bleed.  She has had no fever, no abdominal pain, denies hematemesis, dysuria or other associated symptoms.  The patient's daughter is here and states that she would like the patient to go home her hemoglobin is normal.  I did discuss the patient LELAND Helms, who states that this point of vital signs are stable, her labs are stable with a hemoglobin of 8.8 is fine for her to go home and to increase her PPI to 40 mg twice daily.  For this reason I have increased her PPI to 40 mg twice daily.  In  addition, I had a discussion with both the patient's daughters who understand the patient be discharged and agree with the plan.  They do not want her hospitalized here and like the patient to follow-up with outpatient with her GI physician.  She did receive IV fluid in the form of 500 mL normal saline bolus secondary to slight elevated anion gap.  She did have a slightly troponin but is consistent with her previous troponin she has no EKG changes or suspicion for ST elevation myocardial infarction as she has no chest pain.  Although she has a heart score of 6 the patient does not have evidence of myocardial infarction.      The patient's family would like to take the patient home the patient here for observation.        ADDITIONAL PROBLEM LIST  Peptic ulcer  Weakness  DISPOSITION AND DISCUSSIONS  I have discussed management of the patient with the following physicians and CLAUDINE's: I discussed the patient with Dr. Flores who states that the patient should have her PPI increased and follow-up as an outpatient.    Escalation of care considered, and ultimately not performed:acute inpatient care management, however at this time, the patient is most appropriate for outpatient management    Decision tools and prescription drugs considered including, but not limited to: Considered CT scan of patient's abdomen pelvis but the patient has no evidence of significant intra-abdominal distention or tenderness suspicion for infection if her CT scan was not completed.    FINAL DIAGNOSIS  1. Gastrointestinal hemorrhage, unspecified gastrointestinal hemorrhage type      DISPOSITION:  Patient will be discharged home in stable condition.    FOLLOW UP:  [facility], Emergency Dept  [address] [place] [address] [phone]    If symptoms worsen    Lili Ayala M.D.  [address] [facility] [address] [phone]    Schedule an appointment as soon as possible for a visit in 3 days  As  needed    Elayne Mae, N.P.  [address] [place] [address] [phone]    Schedule an appointment as soon as possible for a visit         OUTPATIENT MEDICATIONS:  Discharge Medication List as of 11/1/2023  7:35 PM        START taking these medications    Details   !! omeprazole (PRILOSEC) 40 MG delayed-release capsule Take 1 Capsule by mouth 2 times a day for 30 days., Disp-60 Capsule, R-0, Normal       !! - Potential duplicate medications found. Please discuss with provider.            Electronically signed by: Colin Kat D.O., 11/1/2023 5:48 PM

## 2023-11-03 NOTE — TELEPHONE ENCOUNTER
Saranya Mandujano reached out for PA assigned to [place] location to assist pt in scheduling appointment with PCP Dr Lili Ayala. After speaking with Saranya [place] team, Patient was contacted.  Left message for Keith Lynn to contact our office and left him my direct ext for [place] location. Advised spouse to return I can assist him in scheduling appointment or if he needs further assistance.  I was not able to reach him.  Appointment created for Tacy on Tuesday 11.07.2023 at 2:05 PM arrival time.      Addendum: spouse called back and was informed of the appointment made for patient on Tuesday 11.07.2023 at 2:05 PM.

## 2023-11-07 PROBLEM — D50.9 IRON DEFICIENCY ANEMIA: Status: ACTIVE | Noted: 2023-01-01

## 2023-11-07 PROBLEM — E43 SEVERE PROTEIN-CALORIE MALNUTRITION (HCC): Chronic | Status: ACTIVE | Noted: 2023-01-01

## 2023-11-07 NOTE — LETTER
[facility]  Lili Ayala M.D.  [address]  [facility] [address]  Fax: [phone]   Authorization for Release/Disclosure of   Protected Health Information   Name: ANTHONY LYNN : 1931 SSN: [id number]   Address: [facility] [address]  [facility] [address] Phone:    [phone] (home)    I authorize the entity listed below to release/disclose the PHI below to:   [facility]/Lili Ayala M.D. and Lili Ayala M.D.   Provider or Entity Name:  [facility]   Address   City, State, Zip   Phone:      Fax:     Reason for request: continuity of care   Information to be released:    [  ] LAST COLONOSCOPY,  including any PATH REPORT and follow-up  [  ] LAST FIT/COLOGUARD RESULT [  ] LAST DEXA  [  ] LAST MAMMOGRAM  [  ] LAST PAP  [  ] LAST LABS [  ] RETINA EXAM REPORT  [  ] IMMUNIZATION RECORDS  [ X ] Release all info      [  ] Check here and initial the line next to each item to release ALL health information INCLUDING  _____ Care and treatment for drug and / or alcohol abuse  _____ HIV testing, infection status, or AIDS  _____ Genetic Testing    DATES OF SERVICE OR TIME PERIOD TO BE DISCLOSED: _____________  I understand and acknowledge that:  * This Authorization may be revoked at any time by you in writing, except if your health information has already been used or disclosed.  * Your health information that will be used or disclosed as a result of you signing this authorization could be re-disclosed by the recipient. If this occurs, your re-disclosed health information may no longer be protected by State or Federal laws.  * You may refuse to sign this Authorization. Your refusal will not affect your ability to obtain treatment.  * This Authorization becomes effective upon signing and will  on (date) __________.      If no date is indicated, this Authorization will  one (1) year from the signature date.    Name: Anthony Lynn  Signature: Date:   2023     PLEASE FAX  REQUESTED RECORDS BACK TO: [phone]

## 2023-11-07 NOTE — PROGRESS NOTES
Subjective:     Chief Complaint   Patient presents with    Hospital Follow-up     Bleeding ulcers, vomiting after eating, laying in bed all day, weak tired           HPI:   Anthony presents today for hospital and ER follow-up.    Patient was admitted 10/14-10/18 for UGI bleed with chronic blood loss anemia.  EGD showed 2 gastric ulcers at pylorus extending to duodenal bulb, scope could not pass due to very narrowing opening around 1-2 mm from acute inflammation and a chronic deformity, plan was for repeat EGD for dilation outpatient.  Discharged on omeprazole 20 mg twice daily.    Patient returned to the ER on 11/1 with rectal bleeding after being sent by GI.  Omeprazole was increased to 40 mg twice daily.  Hemoglobin was 8.8 on discharge.  Iron studies were low and I already ordered an iron infusion.     and daughter are concerned about poor appetite.  Family has been trying to give the patient boost and chicken neuro soup.  Family reports the rectal bleeding has resolved.    Repeat /84 today.   reports he was giving the patient lisinopril 20 mg daily rather than twice daily.        Health Maintenance: Completed    ROS:  Negative except as stated above.      Objective:     Exam:  BP (!) 150/84   Pulse 72   Temp 36 °C (96.8 °F) (Temporal)   Ht 1.524 m (5')   Wt 42.6 kg (94 lb)   SpO2 95%   BMI 18.36 kg/m²  Body mass index is 18.36 kg/m².    Physical Exam    Gen: Alert, no acute distress.  Lungs: Normal effort, CTAB, no wheezing / rhonchi / rales.  CV: RRR, normal S1 and S2, systolic murmur.      Assessment & Plan:     89 y.o. female with the following -     1. Nausea and vomiting, unspecified vomiting type  Acute.  Prescribed Zofran 4 mg as needed.  - ondansetron (ZOFRAN ODT) 4 MG TABLET DISPERSIBLE; Take 1 Tablet by mouth every 6 hours as needed for Nausea/Vomiting.  Dispense: 10 Tablet; Refill: 1    2. Gastrointestinal hemorrhage associated with gastric ulcer  Acute.  Patient was admitted  10/14-10/18 for UGI bleed with chronic blood loss anemia.  EGD showed 2 gastric ulcers at pylorus extending to duodenal bulb, scope could not pass due to very narrowing opening around 1-2 mm from acute inflammation and a chronic deformity, plan was for repeat EGD for dilation outpatient.  Patient saw GI on 11/1 and was sent to the ER for rectal bleeding.  Records requested from GI.  Continue omeprazole 40 mg twice daily.  Patient was advised to schedule appointment with GI for repeat EGD.  Hemoglobin was 8.8 on recent discharge.     3. Iron deficiency anemia, unspecified iron deficiency anemia type  Chronic, uncontrolled.  Hemoglobin was 8.8 on recent discharge and iron studies were low.  I already ordered an iron infusion on 11/2 due to patient unable to tolerate constipation from oral iron.    4. Primary hypertension  Chronic, uncontrolled.  Repeat /84 today.  Has not was advised to get the patient lisinopril 20 mg twice daily.      [facility] Form    Diagnosis to address: N18.4 - Chronic kidney disease, stage 4 (severe) ([facility])  Assessment and plan: Chronic, improving. Nov 2023 GFR 45.  Continue to monitor yearly and avoid nephrotoxic agents.  Last edited 11/07/23 17:09 PST by Lili Ayala M.D.             I spent a total of 35 minutes with record review, exam, communication with the patient, communication with other providers, and documentation of this encounter.      Return in about 3 months (around 2/7/2024) for Follow-up of chronic conditions.    Please note that this dictation was created using voice recognition software. I have made every reasonable attempt to correct obvious errors, but I expect that there are errors of grammar and possibly content that I did not discover before finalizing the note.

## 2023-11-09 NOTE — CASE COMMUNICATION
Pt was laying in bed at visit. Pt says that she sleeps a lot. Educated pt about making sure she changes positions every 2 hours. Pt says that she moves around. Pt says that sometimes she will go in the living room. Pt says that she feels uncomfortable, but she is not in pain. Pt's  says that sometimes her blood pressure goes up when she is in pain. Pt's  says that he gave her tylenol this AM. SN got pt's blood pressure as  164/64. Pt denied any headache or dizziness. Pt's  says that he is giving the lisinopril twice a day to her. Pt's  says that the zofran is just now ready to be picked up at the pharmacy. Pt's  is going to pick it up today.  Pt's  says that pt had a BM yesterday. Pt's  denied any signs of bleeding (i.e. blood in her stool, vomiting up blood or blood in her urine). Pt was eating a bowl of pudding. Pt did n't want to eat it at visit. SN handed it back to her when SN was completed with the visit. Pt says that she doesn't want it back and pt says that she feels like she already ate whole bowl of pudding, but she knows she only at 3 bites. SN asked pt if she was eating frequently and small meals.  Pt says that she is knows that she is eating small amounts, but definitely not frequently.

## 2023-11-13 NOTE — ED NOTES
Patient returned from CT scan, no pules noted. ERP to bedside. PT DNR/DNI, time of death - 0124. Family at bedside and family not at bedside notified.

## 2023-11-13 NOTE — ED PROVIDER NOTES
ED Provider Note    CHIEF COMPLAINT  Abdominal pain    EXTERNAL RECORDS REVIEWED  Inpatient Notes recent admission 2 weeks prior for concern for bleeding gastric ulcers    HPI/ROS  LIMITATION TO HISTORY   Select: Altered mental status / Confusion  OUTSIDE HISTORIAN(S):  Family daughter at bedside as well as EMS at arrival    Anthony Lynn is a 89 y.o. female who presents to the emergency department with chief complaint of epigastric abdominal pain.  Pain began hours later and is getting progressively worse since that time.  Patient also reports severe pain into her back and moderate nauseousness.  She denies chest pain at this time.  She was made a STEMI alert by EMS in route with concerns of anterior elevation and inferior depression.  Upon arrival her EKG as compared to previous EKGs that show similar left bundle branch block.  She is having no chest pain at this time however severe abdominal pain and back pain.  No bowel movement today but states that bowel movements have been better over the last few days no problems with urination further history of present illness limited by altered mental status and critical presentation    PAST MEDICAL HISTORY   has a past medical history of SUMA (acute kidney injury) ([facility]) (7/25/2018), Anxiety, Arthritis, CAD (coronary artery disease), CATARACT, Dental disorder, Dyslipidemia, Fibromyalgia, Generalized abdominal pain (10/14/2021), GERD (gastroesophageal reflux disease), Glaucoma, Headache(784.0), Hives, Hives, HTN, IBS (irritable bowel syndrome), Indigestion, Major depression in remission ([facility]) (5/31/2022), Myocardial infarct ([facility]), PAD (peripheral artery disease) ([facility]) (5/31/2022), Pain (7/31/15), Palpitations, Psychiatric problem, Renal insufficiency (4/12/2011), Snoring, Toe dislocation (5/17/2012), Unspecified disorder of thyroid, and Unspecified urinary incontinence.    SURGICAL HISTORY   has a past surgical history that includes other; cataract extraction with  iol; toe amputation (Left, 8/6/2015); upper gi endoscopy,diagnosis (N/A, 10/15/2023); and upper gi endoscopy,biopsy (N/A, 10/15/2023).    FAMILY HISTORY  Family History   Problem Relation Age of Onset    Heart Disease Father     Lung Disease Father        SOCIAL HISTORY  Social History     Tobacco Use    Smoking status: Never    Smokeless tobacco: Never   Vaping Use    Vaping Use: Never used   Substance and Sexual Activity    Alcohol use: No    Drug use: No    Sexual activity: Not Currently     Partners: Male       CURRENT MEDICATIONS  Home Medications    **Home medications have not yet been reviewed for this encounter**         ALLERGIES  Allergies   Allergen Reactions    Nkda [No Known Drug Allergy]        PHYSICAL EXAM  VITAL SIGNS: BP: 115/70, pulse 88, pulse ox 91% 2 L nasal cannula  Pulse ox interpretation: Borderline hypoxic on 2 L by nasal cannula  Constitutional: Intermittently disoriented severe distress  HEENT: Atraumatic normocephalic, pupils are equal round reactive to light extraocular movements are intact.  Dry mucous membranes  Neck: Positive JVD, positive subcutaneous air bilaterally  Cardiovascular: Regular rate and rhythm no murmur rub or gallop 2+ pulses peripherally x4  Thorax & Lungs: No respiratory distress, no wheezes rales or rhonchi, No chest tenderness.   GI: Rigid distended peritoneal diffusely  Skin: Pale diaphoretic  Musculoskeletal: No acute deformity  Neurologic: Cranial nerves III through XII are grossly intact   Psychiatric: Appropriate affect for situation at this time        DIAGNOSTIC STUDIES / PROCEDURES  Results for orders placed or performed during the hospital encounter of 11/13/23   COMP METABOLIC PANEL   Result Value Ref Range    Sodium 133 (L) 135 - 145 mmol/L    Potassium 4.0 3.6 - 5.5 mmol/L    Chloride 95 (L) 96 - 112 mmol/L    Co2 14 (L) 20 - 33 mmol/L    Anion Gap 24.0 (H) 7.0 - 16.0    Glucose 168 (H) 65 - 99 mg/dL    Bun 15 8 - 22 mg/dL    Creatinine 1.26 0.50 -  1.40 mg/dL    Calcium 11.3 (H) 8.5 - 10.5 mg/dL    Correct Calcium 11.5 (H) 8.5 - 10.5 mg/dL    AST(SGOT) 26 12 - 45 U/L    ALT(SGPT) 8 2 - 50 U/L    Alkaline Phosphatase 68 30 - 99 U/L    Total Bilirubin 0.2 0.1 - 1.5 mg/dL    Albumin 3.7 3.2 - 4.9 g/dL    Total Protein 7.5 6.0 - 8.2 g/dL    Globulin 3.8 (H) 1.9 - 3.5 g/dL    A-G Ratio 1.0 g/dL   LIPASE   Result Value Ref Range    Lipase 129 (H) 11 - 82 U/L   TROPONIN   Result Value Ref Range    Troponin T 44 (H) 6 - 19 ng/L   proBrain Natriuretic Peptide, NT   Result Value Ref Range    NT-proBNP 5689 (H) 0 - 125 pg/mL   APTT   Result Value Ref Range    APTT 23.7 (L) 24.7 - 36.0 sec   PROTHROMBIN TIME (INR)   Result Value Ref Range    PT 13.6 12.0 - 14.6 sec    INR 1.03 0.87 - 1.13   ESTIMATED GFR   Result Value Ref Range    GFR (CKD-EPI) 40 (A) >60 mL/min/1.73 m 2   EKG   Result Value Ref Range    Report       [facility] Emergency Dept.    Test Date:  2023  Pt Name:    KEREN HUFF                Department: ER  MRN:        [id number]                      Room:       TRAUMA - EXAM 1  Gender:     Female                       Technician: [id number]  :        [id number]                   Requested By:AMBAR ANTUNEZ  Order #:    [id number]                    Reading MD:    Measurements  Intervals                                Axis  Rate:       103                          P:          70  OR:         172                          QRS:        76  QRSD:       137                          T:          262  QT:         334  QTc:        437    Interpretive Statements  Sinus tachycardia  Multiform ventricular premature complexes  IVCD, consider atypical LBBB  Artifact in lead(s) I,II,III,aVR,aVL,aVF,V1,V2,V3,V4,V5,V6  Compared to ECG 2023 18:05:51  Ventricular premature complex(es) now present  Sinus rhythm no longer present     Epiphany interpretation system down: Left bundle branch block similar morphology from previous  EKGs.    RADIOLOGY  I have independently interpreted the diagnostic imaging associated with this visit and am waiting the final reading from the radiologist.   My preliminary interpretation is as follows:   Chest x-ray in resuscitation bay shows extensive free air under the diaphragm depressing liver    CAT scan interpreted in the CAT scan suite shows incredible amount of intraperitoneal free air including free air within the entire portal system    Radiologist interpretation:   CT-ABDOMEN-PELVIS WITH    (Results Pending)   DX-CHEST-LIMITED (1 VIEW)    (Results Pending)         COURSE & MEDICAL DECISION MAKING    ED Observation Status? No; Patient does not meet criteria for ED Observation.     ASSESSMENT, COURSE AND PLAN    Care Narrative: 89-year-old female arrives to the emergency department as a STEMI activation as she was having epigastric pain and did have ST elevation anteriorly with reciprocal depression inferiorly.  This is likely due to to her known intraventricular conduction delay this been demonstrated on multiple previous EKGs.  Bedside ultrasound was attempted and demonstrated likely large amount of free air.  Chest x-ray was completed which shows an extreme amount of free air with depression of liver and intra-abdominal organs.  At this point patient's mental status is waxing and waning I did have discussion with her  Keith venegas over the phone as he was identified as power of .  He states that she would not want CPR if her heart was to stop and would not want to be on a breathing machine however he stated that if surgery was the only option to attempt to proceed.  CT scan was obtained and as above shows extreme amount of intraperitoneal free air with including free air within the portal system and multiple venous structures.  I did briefly discussed this with her general surgeon on-call we are both of the opinion that patient would not survive the surgery.  I called the   back to further discuss this and discussed pursuing comfort care measures.  Well on the phone with he has been patient went apneic and lost pulse.  Due to previous wishes to not have CPR or be on a breathing machine there is no resuscitative efforts at that point and time of death was called at 01:24 AM 11/13/2023.        FINAL DIAGNOSIS  1.  Abdominal pain  2.  Perforated abdominal viscus  3.  Cardiopulmonary arrest  4.  Death       Electronically signed by: Nabeel Pierce M.D.

## 2023-11-13 NOTE — DISCHARGE PLANNING
"Medical Social Work    Referral: STEMI    Intervention: Pt is a 89 year old female brought in by ODILIA from home for STEMI.  Pt is Anthony yLnn (: 1931).  Pt arrives with her step-daughter, Jami ([phone]) who states that pt's spouse, Keith ([phone]) is pt's decision maker and available by phone as he's unable to come into the hospital.  Pt's step-daughter and ERP spoke with pt's spouse to update him.  Family states that pt is a DNR; no advanced directives found in pt's chart.    Plan: SW will follow.    0130: SW received a call from nursing staff that pt has .  MSW met with pt's step-daughter again outside of pt's room.  Step-daughter was provided with Helpful Information brochure, ER SW number and mortuary list.  She states that pt's spouse is on his way but will not want to see pt and is just \"picking me up\".  Pt's step-daughter did state that pt's daughter is coming into the hospital and she's not sure if pt's daughter is aware of pt's death.  Pt's step-daughter left when pt's spouse arrived to the ER Lobby.  Emotional support provided to pt's family.    0200: Pt's daughter, Tamara ([phone]) arrived and was escorted to bedside.  Pt's daughter was able to speak with ERP regarding pt.  All questions answered.  SW will remain available for family support.  "

## 2024-04-23 NOTE — ED NOTES
Health Maintenance       Pneumococcal Vaccine 0-64 (1 of 2 - PCV)  Never done    Varicella Vaccine (1 of 2 - 13+ 2-dose series)  Never done    Hepatitis B Vaccine (1 of 3 - 19+ 3-dose series)  Never done    COVID-19 Vaccine (1 - 2023-24 season)  Never done           Following review of the above:  Patient is not proceeding with: COVID-19 and Pneumococcal    Note: Refer to final orders and clinician documentation.         Bedside report to LORETTA Hopkins    Pt is sleeping in bed. Gurney in low position.  Call light within reach.    Cardiac monitor: Yes  Pulse ox: Yes  Oxygen 2 lpm via NC connected to wall  Fall risk: Bed alarm in use  PIV: PIV g18 right and left AC

## (undated) DEVICE — SODIUM CHL IRRIGATION 0.9% 1000ML (12EA/CA)

## (undated) DEVICE — ELECTRODE 850 FOAM ADHESIVE - HYDROGEL RADIOTRNSPRNT (50/PK)

## (undated) DEVICE — SET LEADWIRE 5 LEAD BEDSIDE DISPOSABLE ECG (1SET OF 5/EA)

## (undated) DEVICE — MASK PANORAMIC OXYGEN PRO2 (30EA/CA)

## (undated) DEVICE — FORCEP RADIAL JAW 4 STANDARD CAPACITY W/NEEDLE 240CM (40EA/BX)

## (undated) DEVICE — CONTAINER, SPECIMEN, STERILE

## (undated) DEVICE — TUBE CONNECTING SUCTION - CLEAR PLASTIC STERILE 72 IN (50EA/CA)

## (undated) DEVICE — KIT CUSTOM PROCEDURE SINGLE FOR ENDO  (15/CA)

## (undated) DEVICE — BLOCK BITE MAXI DENTAL RETENTION RIM (100EA/BX)

## (undated) DEVICE — MASK OXYGEN VNYL ADLT MED CONC WITH 7 FOOT TUBING  - (50EA/CA)

## (undated) DEVICE — PORT AUXILLARY WATER (50EA/BX)

## (undated) DEVICE — SENSOR OXIMETER ADULT SPO2 RD SET (20EA/BX)

## (undated) DEVICE — FILM CASSETTE ENDO

## (undated) DEVICE — BUTTON ENDOSCOPY DISPOSABLE

## (undated) DEVICE — CANISTER SUCTION RIGID RED 1500CC (40EA/CA)

## (undated) DEVICE — WATER IRRIGATION STERILE 1000ML (12EA/CA)